# Patient Record
Sex: MALE | Race: WHITE | NOT HISPANIC OR LATINO | URBAN - METROPOLITAN AREA
[De-identification: names, ages, dates, MRNs, and addresses within clinical notes are randomized per-mention and may not be internally consistent; named-entity substitution may affect disease eponyms.]

---

## 2017-01-01 ENCOUNTER — HOME CARE/HOSPICE - HEALTHEAST (OUTPATIENT)
Dept: HOSPICE | Facility: HOSPICE | Age: 62
End: 2017-01-01

## 2017-01-01 ENCOUNTER — COMMUNICATION - HEALTHEAST (OUTPATIENT)
Dept: FAMILY MEDICINE | Facility: CLINIC | Age: 62
End: 2017-01-01

## 2017-01-01 ENCOUNTER — AMBULATORY - HEALTHEAST (OUTPATIENT)
Dept: HOSPICE | Facility: HOSPICE | Age: 62
End: 2017-01-01

## 2017-01-01 ENCOUNTER — HOSPITAL ENCOUNTER (INPATIENT)
Dept: MEDSURG UNIT | Facility: HOSPITAL | Age: 62
Discharge: HOME | End: 2017-01-25
Attending: HOSPITALIST | Admitting: INTERNAL MEDICINE

## 2017-01-01 ENCOUNTER — SURGERY - HEALTHEAST (OUTPATIENT)
Dept: SURGERY | Facility: HOSPITAL | Age: 62
End: 2017-01-01

## 2017-01-01 ENCOUNTER — ANESTHESIA - HEALTHEAST (OUTPATIENT)
Dept: SURGERY | Facility: HOSPITAL | Age: 62
End: 2017-01-01

## 2017-01-01 DIAGNOSIS — G93.40 ENCEPHALOPATHY: ICD-10-CM

## 2017-01-01 DIAGNOSIS — K66.1: ICD-10-CM

## 2017-01-01 DIAGNOSIS — K70.31 ASCITES DUE TO ALCOHOLIC CIRRHOSIS (H): ICD-10-CM

## 2017-01-01 DIAGNOSIS — I95.9 HYPOTENSION: ICD-10-CM

## 2017-01-01 DIAGNOSIS — D64.9 ANEMIA: ICD-10-CM

## 2017-01-01 DIAGNOSIS — R46.89 COMPULSIVE BEHAVIORS: ICD-10-CM

## 2017-01-01 DIAGNOSIS — F10.930 ALCOHOL WITHDRAWAL, UNCOMPLICATED (H): ICD-10-CM

## 2017-01-01 DIAGNOSIS — L29.3 PERINEAL IRRITATION: ICD-10-CM

## 2017-01-01 DIAGNOSIS — R00.0 TACHYCARDIA: ICD-10-CM

## 2017-01-01 DIAGNOSIS — D62 ACUTE BLOOD LOSS ANEMIA: ICD-10-CM

## 2017-01-01 DIAGNOSIS — E87.20 LACTIC ACIDOSIS: ICD-10-CM

## 2017-01-01 DIAGNOSIS — K72.10 END STAGE LIVER DISEASE (H): ICD-10-CM

## 2017-01-01 DIAGNOSIS — B37.0 ORAL CANDIDIASIS: ICD-10-CM

## 2017-01-01 DIAGNOSIS — K76.9 LIVER DISEASE: ICD-10-CM

## 2017-01-01 DIAGNOSIS — M54.50 LUMBAGO: ICD-10-CM

## 2017-01-01 DIAGNOSIS — K70.10 ALCOHOLIC HEPATITIS WITHOUT ASCITES (H): ICD-10-CM

## 2017-01-01 DIAGNOSIS — K66.1 HEMOPERITONEUM: ICD-10-CM

## 2017-01-01 DIAGNOSIS — F09 COGNITIVE DISORDER: ICD-10-CM

## 2017-01-01 LAB
ATRIAL RATE - MUSE: 114 BPM
ATRIAL RATE - MUSE: 57 BPM
ATRIAL RATE - MUSE: 71 BPM
ATRIAL RATE - MUSE: 85 BPM
BLD PROD TYP BPU: NORMAL
BLOOD EXPIRATION DATE: NORMAL
BLOOD TYPE: 600
BLOOD TYPE: 6200
CODING SYSTEM: NORMAL
COMPONENT (HISTORICAL CONVERSION): NORMAL
CROSSMATCH: NORMAL
DIASTOLIC BLOOD PRESSURE - MUSE: NORMAL MMHG
HBA1C MFR BLD: 5 % (ref 4.2–6.1)
INTERPRETATION ECG - MUSE: NORMAL
ISSUE DATE AND TIME: NORMAL
P AXIS - MUSE: 22 DEGREES
P AXIS - MUSE: 23 DEGREES
P AXIS - MUSE: 23 DEGREES
P AXIS - MUSE: 24 DEGREES
PR INTERVAL - MUSE: 138 MS
PR INTERVAL - MUSE: 142 MS
PR INTERVAL - MUSE: 152 MS
PR INTERVAL - MUSE: 160 MS
QRS DURATION - MUSE: 102 MS
QRS DURATION - MUSE: 88 MS
QRS DURATION - MUSE: 90 MS
QRS DURATION - MUSE: 98 MS
QT - MUSE: 348 MS
QT - MUSE: 412 MS
QT - MUSE: 458 MS
QT - MUSE: 514 MS
QTC - MUSE: 479 MS
QTC - MUSE: 490 MS
QTC - MUSE: 497 MS
QTC - MUSE: 500 MS
R AXIS - MUSE: -15 DEGREES
R AXIS - MUSE: -19 DEGREES
R AXIS - MUSE: -21 DEGREES
R AXIS - MUSE: -23 DEGREES
STATUS (HISTORICAL CONVERSION): NORMAL
SYSTOLIC BLOOD PRESSURE - MUSE: NORMAL MMHG
T AXIS - MUSE: -11 DEGREES
T AXIS - MUSE: -14 DEGREES
T AXIS - MUSE: -6 DEGREES
T AXIS - MUSE: 11 DEGREES
UNIT ABO/RH (HISTORICAL CONVERSION): NORMAL
UNIT NUMBER: NORMAL
VENTRICULAR RATE- MUSE: 114 BPM
VENTRICULAR RATE- MUSE: 57 BPM
VENTRICULAR RATE- MUSE: 71 BPM
VENTRICULAR RATE- MUSE: 85 BPM

## 2017-01-01 RX ORDER — HALOPERIDOL 2 MG/ML
1-2 SOLUTION ORAL EVERY 4 HOURS PRN
Status: SHIPPED | COMMUNITY
Start: 2017-01-01

## 2017-01-01 RX ORDER — SORBITOL SOLUTION 70 %
15-30 SOLUTION, ORAL MISCELLANEOUS DAILY PRN
Status: SHIPPED | COMMUNITY
Start: 2017-01-01

## 2017-01-01 RX ORDER — TRAZODONE HYDROCHLORIDE 150 MG/1
150 TABLET ORAL AT BEDTIME
Status: SHIPPED | COMMUNITY
Start: 2017-01-01

## 2017-01-01 RX ORDER — HYDROMORPHONE HYDROCHLORIDE 1 MG/ML
1 SOLUTION ORAL 3 TIMES DAILY
Status: SHIPPED | COMMUNITY
Start: 2017-01-01

## 2017-01-01 RX ORDER — OXYMETAZOLINE HYDROCHLORIDE 0.05 G/100ML
2 SPRAY NASAL SEE ADMIN INSTRUCTIONS
Status: SHIPPED | COMMUNITY
Start: 2017-01-01

## 2017-01-01 RX ORDER — HYDROMORPHONE HYDROCHLORIDE 1 MG/ML
0.5-1 SOLUTION ORAL
Qty: 210 ML | Refills: 0 | Status: SHIPPED
Start: 2017-01-01

## 2017-01-01 RX ORDER — GABAPENTIN 250 MG/5ML
300 SOLUTION ORAL EVERY 8 HOURS
Qty: 470 ML | Refills: 12 | Status: SHIPPED
Start: 2017-01-01

## 2017-01-01 RX ORDER — GABAPENTIN 250 MG/5ML
500 SOLUTION ORAL 2 TIMES DAILY PRN
Qty: 470 ML | Refills: 12 | Status: SHIPPED
Start: 2017-01-01

## 2017-01-01 RX ORDER — GINSENG 100 MG
1 CAPSULE ORAL 2 TIMES DAILY
Status: SHIPPED | COMMUNITY
Start: 2017-01-01

## 2017-01-01 RX ORDER — LORAZEPAM 2 MG/ML
2 CONCENTRATE ORAL
Status: SHIPPED | COMMUNITY
Start: 2017-01-01

## 2017-01-01 RX ORDER — MENTHOL AND ZINC OXIDE .44; 20.625 G/100G; G/100G
1 OINTMENT TOPICAL 4 TIMES DAILY PRN
Refills: 0 | Status: SHIPPED
Start: 2017-01-01

## 2017-01-01 RX ORDER — LORAZEPAM 2 MG/ML
1-2 CONCENTRATE ORAL AT BEDTIME
Status: SHIPPED | COMMUNITY
Start: 2017-01-01

## 2017-01-01 RX ORDER — BISACODYL 10 MG
10 SUPPOSITORY, RECTAL RECTAL DAILY PRN
Status: SHIPPED | COMMUNITY
Start: 2017-01-01

## 2017-01-01 RX ORDER — HALOPERIDOL 5 MG/ML
5 INJECTION INTRAMUSCULAR ONCE
Status: SHIPPED | COMMUNITY
Start: 2017-01-01

## 2017-01-01 ASSESSMENT — MIFFLIN-ST. JEOR
SCORE: 2057.7
SCORE: 2043.63
SCORE: 1892.63
SCORE: 1906.63
SCORE: 1961.63
SCORE: 2038.63
SCORE: 1900.63
SCORE: 1868.63
SCORE: 1937.63
SCORE: 1906.63
SCORE: 1900.63
SCORE: 2032.63
SCORE: 2040.63
SCORE: 2043.63
SCORE: 2040.63
SCORE: 2038.63
SCORE: 2047.27
SCORE: 2069.95
SCORE: 1961.63
SCORE: 2009.62
SCORE: 2069.95
SCORE: 1880.34
SCORE: 2009.62
SCORE: 1892.63
SCORE: 2032.63
SCORE: 2047.27
SCORE: 1937.63
SCORE: 2057.7
SCORE: 1983.63
SCORE: 1983.63
SCORE: 1868.63

## 2021-05-30 VITALS
WEIGHT: 218.92 LBS | BODY MASS INDEX: 27.22 KG/M2 | HEIGHT: 75 IN | WEIGHT: 218.92 LBS | BODY MASS INDEX: 27.22 KG/M2 | HEIGHT: 75 IN

## 2021-05-30 VITALS — BODY MASS INDEX: 26.55 KG/M2 | HEIGHT: 76 IN | WEIGHT: 218 LBS

## 2021-06-08 NOTE — PROGRESS NOTES
Patient continues to bleed. Received total 4 units PRBC's overnight. Fibrinogen is 205. Platelets 180.  His INR is elevated but in patients with liver disease elevated INR is a poor marker of coagulation since other proteins of the clotting / lysis cascade are affected as well and they are not measured by PT/INR. TEG/Rottem  would be a better test  I will go ahead and give him Kcentra instead of multiple units of FFP since increasing his volume aggressively will lead to increased portal pressures and more bleeding.   Calcium gluconate 1 gr.    He is going to the OR this AM. D/W Dr. Hodge.

## 2021-06-08 NOTE — PROGRESS NOTES
ICU PROGRESS NOTE:    Assessment/Plan:  Babak Wolff is a 61 y.o. 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    NEURO:  Acute encephalopathy, agitated delirium: Multiple factors including possible hepatic encephalopathy, septic encephalopathy.  Ammonia on admission 17.  EtOH level negative on admission, unknown most recent EtOH use.    Recheck ammonia - only 34    dexmedetomidine as needed    Will try olanzapine at HS    Psychiatry had recommended scheduled gabapentin - will schedule and clamp NG tube if tolerated    Assess for and treat causes of sepsis    Minimize benzos as able.    CVS:  No active issues.    monitor    RESP:  No active issues.    monitor    GI:  Hemoperitoneum, likely advanced liver disease/cirrhosis, alcohol dependence: Patient has had chronic intermittent abdominal pain.  Had minimized alcohol use as outpatient.  Brother and sister confirm that he is a severe alcoholic, after moving back here from [place] he had multiple empty 1.75-L vodka bottles throughout his home, would start drinking in the morning and was often intoxicated by mid-day; his live-in girlfriend has reportedly had to bring vodka bottles to work with her to prevent him from drinking them.  Now with hemoperitoneum, mesenteric varix, coagulopathy with INR 1.5-1.7, tbili 4-5, -120 (now higher likely reactive to acute infection today), albumin in 2s.  CT with evidence of diffuse liver disease.    Ammonia as above, schedule lactulose as needed    Will need close follow-up with chem dep and GI if able to recover    Discussed with family that liver transplant is not a consideration in this acute setting    RENAL:  Currently stable kidney  "function.    monitor    ID:  Hemoperitoneum, fever, neutrophilia: High risk of peritonitis, and high risk of fungal peritonitis/fungemia with hemoperitoneum, liver disease and TPN.  On meropenem and vanco but temp up to 39.7 overnight, WBC up to 29.3.    ID consult - appreciated    Continue meropenem and vanco    Starting micafungin today    Repeat blood cultures (PICC and peripheral)    Fungal blood culture    HEMATOLOGIC:  Hemoperitoneum, anemia: Acute blood loss and anemia of sepsis/critical illness. Coagulopathy, likely due to cirrhosis, nutritional, consumptive    Monitor with restrictive transfusion threshold    Monitor INR prn    TPN for nutrition    ENDOCRINE:  No active issues.    Monitor    ICU PROPHYLAXIS:    SCDs    PPI    DISPO/CODE STATUS: FULL CODE.  Today (1/19/2017), the patient is critically ill with agitated delirium.    FAMILY COMMUNICATION: Today (1/19/2017), spoke with the patient's brother and sister at the bedside.    Lines/Drains/Tubes:  ABDIFATAHE PICC, Placed on 1/14/17  OG tube  Saeed catheter  ZACHARY peritoneal drains x 2    Overnight events:  High fever 39.7.  Still delirious, difficulty vocalizing.  Tearful.    Subjective:  Unable to answer yes/no questions.    Objective:  Physical Exam:  Vent settings for last 24 hours: none       Visit Vitals     /56     Pulse 95     Temp 98.6  F (37  C) (Axillary)     Resp 25     Ht 6' 3\" (1.905 m)     Wt (!) 234 lb 2.1 oz (106.2 kg)     SpO2 95%     BMI 29.26 kg/m2       Intake/Output last 3 shifts:  I/O last 3 completed shifts:  In: 4535.3 [I.V.:1557.3; IV Piggyback:1060]  Out: 6960 [Urine:1775; Emesis/NG output:2585; Drains:2600]  Intake/Output this shift:  I/O this shift:  In: -   Out: 325 [Urine:150; Drains:175]    Physical Exam  Gen: somnolent, tearful, can say his name but difficulty answering other questions  HEENT: no OP lesions, no TOMMY  CV: RRR, no m/g/r  Resp: CTAB  Abd: soft, nontender, BS+  Neuro: PERRL, nonfocal  Ext: no " edema    LAB:    Results from last 7 days  Lab Units 01/19/17  0601   LN-WHITE BLOOD CELL COUNT thou/uL 29.3*   LN-HEMOGLOBIN g/dL 9.2*   LN-HEMATOCRIT % 28.0*   LN-PLATELET COUNT thou/uL 207       Results from last 7 days  Lab Units 01/19/17  0601 01/18/17  0501 01/17/17  0442   LN-SODIUM mmol/L 148*  148* 148* 146*  146*   LN-POTASSIUM mmol/L 3.6  3.6 3.6 3.9  3.9   LN-CHLORIDE mmol/L 117*  117* 117* 116*  116*   LN-CO2 mmol/L 24  24 24 23  24   LN-BLOOD UREA NITROGEN mg/dL 17  17 19 15  15   LN-CREATININE mg/dL 0.79  0.79 0.81 0.74  0.73   LN-CALCIUM mg/dL 8.1*  8.1* 8.2* 7.9*  7.9*   LN-PROTEIN TOTAL g/dL 5.5* 5.0* 4.6*  4.8*   LN-BILIRUBIN TOTAL mg/dL 5.1* 4.2* 4.3*  4.3*   LN-ALKALINE PHOSPHATASE U/L 69 55 47  46   LN-ALT (SGPT) U/L 37 26 26   LN-AST (SGOT) U/L 108* 73* 71*  70*         Current Facility-Administered Medications   Medication Dose Route Frequency Provider Last Rate Last Dose     acetaminophen suppository 325 mg (TYLENOL)  325 mg Rectal Q4H PRN Chi Watson MD   325 mg at 01/19/17 0546     albuterol nebulizer solution 2.5 mg (PROVENTIL)  2.5 mg Nebulization Q4H PRN Joy Delgado CNP         benzocaine-menthol lozenge 1 lozenge (CEPACOL)  1 lozenge Oral Q1H PRN Chi Watson MD         bisacodyl suppository 10 mg (DULCOLAX)  10 mg Rectal Daily PRN Chi Watson MD   10 mg at 01/17/17 1424     dexmedetomidine 400 mcg/100 mL in NS (PRECEDEX) (4mcg/mL)  0.2-1.4 mcg/kg/hr Intravenous Continuous Joy Schwartz CNP 5.9 mL/hr at 01/19/17 1204 0.2 mcg/kg/hr at 01/19/17 1204     dextrose 10%  0-200 mL/hr Intravenous Continuous PRN Alf Shannon MD         dextrose 50 % (D50W) syringe 20-50 mL  20-50 mL Intravenous PRN Chi Watson MD         fat emulsion 20 % infusion 250 mL (INTRALIPID,LIPOSYN)  250 mL Intravenous Once per day on Sun Tue Thu Sat Alf Shannon MD 10.4 mL/hr at 01/17/17 2122 250 mL at 01/17/17 2122     fentaNYL pf  injection 25-50 mcg (SUBLIMAZE)  25-50 mcg Intravenous Q1H PRN Alf Shannon MD   50 mcg at 01/19/17 0158     gabapentin 250 mg/5 mL solution 500 mg (NEURONTIN)  500 mg Enteral Tube BID PRN Sarahruben Cox CNP         glucagon (human recombinant) injection 1 mg  1 mg Subcutaneous PRN Chi Watson MD         haloperidol lactate injection 1-2 mg (HALDOL)  1-2 mg Intravenous Q6H PRN Joy Delgado CNP   2 mg at 01/19/17 0132     heparin (PF) subcutaneous injection 5,000 Units  5,000 Units Subcutaneous Q12H 09-21 Joy Schwartz CNP   5,000 Units at 01/19/17 0841     hydrALAZINE injection 10 mg (APRESOLINE)  10 mg Intravenous Q6H PRN Chi Watson MD   10 mg at 01/19/17 0455     insulin aspart injection (NovoLOG)   Subcutaneous Q6H FIXED TIMES Joy Schwartz CNP   4 Units at 01/19/17 1119     insulin glargine injection 10 Units (LANTUS)  10 Units Subcutaneous BID Joy Schwartz CNP   10 Units at 01/19/17 0945     labetalol 20 mg/4 mL (5 mg/mL) injection 10-20 mg (TRANDATE)  10-20 mg Intravenous Q4H PRN Gela Pinon   10 mg at 01/19/17 0618     LORazepam injection 1 mg (ATIVAN)  1 mg Intravenous TID Sarah STEVE Cox CNP   1 mg at 01/19/17 0945     LORazepam injection 1-2 mg (ATIVAN)  1-2 mg Intravenous Q4H PRN Joy Delgado CNP   2 mg at 01/19/17 0607     magnesium hydroxide suspension 30 mL (MILK OF MAG)  30 mL Oral Daily PRN Chi Watson MD   30 mL at 01/16/17 1949     meropenem 1 g in NaCl 0.9 % 50 mL (MINI-BAG Plus) (MERREM)  1 g Intravenous Q8H Ry Pemberton  mL/hr at 01/19/17 1034 1 g at 01/19/17 1034     micafungin 100 mg in NaCl 0.9 % 100 mL (MINI-BAG Plus) (MYCAMINE)  100 mg Intravenous Q24H Katheryn Valente MD         morphine injection 1-2 mg  1-2 mg Intravenous Q4H PRN Joy Schwartz CNP   2 mg at 01/19/17 0126     naloxone injection 0.04-0.1 mg (NARCAN)  0.04-0.1 mg Intravenous PRN Spencer Mayorga MD        Or     naloxone injection 0.1-0.4  mg (NARCAN)  0.1-0.4 mg Intramuscular PRN Spencer Mayorga MD         ondansetron injection 4 mg (ZOFRAN)  4 mg Intravenous Q4H PRN Chi Watson MD   4 mg at 01/18/17 1720    Or     ondansetron tablet 8 mg (ZOFRAN)  8 mg Oral Q8H PRN Chi Watson MD         oxyCODONE immediate release tablet 5 mg (ROXICODONE)  5 mg Oral Q4H PRN Chi Watson MD   5 mg at 01/17/17 1526     polyvinyl alcohol 1.4 % ophthalmic solution 1-2 drop (LIQUIFILM TEARS)  1-2 drop Both Eyes Q1H PRN Chi Watson MD   2 drop at 01/16/17 1742     senna-docusate 8.6-50 mg tablet 1 tablet (PERICOLACE)  1 tablet Oral BID Chi Watson MD   1 tablet at 01/18/17 0832    Or     sennosides syrup 8.8 mg (for SENOKOT)  8.8 mg Enteral Tube BID Chi Watson MD   8.8 mg at 01/17/17 1111     sodium chloride 0.45%  25 mL/hr Intravenous Continuous Gela Dahm 25 mL/hr at 01/18/17 0713 25 mL/hr at 01/18/17 0713     sodium chloride 0.9 % flush 10-20 mL (NS)  10-20 mL Intravenous PRN Alf Shannon MD   10 mL at 01/17/17 0425     sodium chloride 0.9 % flush 10-30 mL (NS)  10-30 mL Intravenous PRN Alf Shannon MD         sodium chloride 0.9 % flush 10-30 mL (NS)  10-30 mL Intravenous Q8H FIXED TIMES Alf Shannon MD   20 mL at 01/19/17 0601     sodium chloride 0.9 % flush 20 mL (NS)  20 mL Intravenous PRN Alf Shannon MD         sodium chloride 0.9 % flush 3 mL (NS)  3 mL Intravenous PRN Spencer Mayorga MD   10 mL at 01/11/17 1417     sodium phosphates 133 mL rectal enema 1 enema (for FLEET)  1 enema Rectal Daily PRN Gela Dahm   1 enema at 01/16/17 2115     TPN - Custom formula   Intravenous TPN - see admin instructions Ry Pemberton MD 75 mL/hr at 01/18/17 1944       TPN - Custom formula   Intravenous TPN - see admin instructions Johnnie Lockett MD         vancomycin 1.5 g in dextrose 5% 500 mL (VANCOCIN)  1.5 g Intravenous Q12H Joy Schwartz, JOHNNY 176.7 mL/hr at  01/19/17 1109 1.5 g at 01/19/17 1109       ICU DAILY CHECKLIST                           Can patient transfer out of MICU? no    FAST HUG:    Feeding:  Feeding: TPN, OG to LIS.  Saeed:Yes  Analgesia/Sedation:Yes dexmedetomidine  Thromboembolic prophylaxis: yes; Mode:  SCDs  HOB>30:  Yes  Stress Ulcer Protocol Active: yes; Mode: H2 Antagonist  Glycemic Control: Any glucose > 180 yes; Mode of Insulin Therapy: Sliding Scale Insulin    INTUBATED:  Can patient have daily waking:  not applicable  Can patient have spontaneous breathing trial:  not applicable    Restraints? no    PHYSICAL THERAPY AND MOBILITY:  Can patient have PT and mobility trial: no  Activity: Bed Rest    Total Critical Care Time : 45 Minutes      Johnnie Lockett MD  Pulmonary and Critical Care Medicine  [facility]  Cell [phone]  Office [phone]  Pager [phone]

## 2021-06-08 NOTE — PROGRESS NOTES
This note also relates to the following rows which could not be included:  Vent Mode - Cannot attach notes to unvalidated device data  Resp Rate (Set) - Cannot attach notes to unvalidated device data  FiO2 (%) - Cannot attach notes to unvalidated device data  Vt (Set, mL) - Cannot attach notes to unvalidated device data  PEEP/CPAP (cm H2O) - Cannot attach notes to unvalidated device data  Insp Flow (L/min) - Cannot attach notes to unvalidated device data  Vt Exp (mL) - Cannot attach notes to unvalidated device data  Minute Ventilation (L/min) - Cannot attach notes to unvalidated device data  Total Resp Rate  - Cannot attach notes to unvalidated device data  PIP Observed (cm H2O) - Cannot attach notes to unvalidated device data  MAP (cm H2O) - Cannot attach notes to unvalidated device data  SpO2 - Cannot attach notes to unvalidated device data  Heart Rate - Cannot attach notes to unvalidated device data  High Resp Rate - Cannot attach notes to unvalidated device data  Low PEEP - Cannot attach notes to unvalidated device data  Insp Pressure High (cm H2O) - Cannot attach notes to unvalidated device data  Insp Pressure Low (cm H2O) - Cannot attach notes to unvalidated device data  MV High (L/min) - Cannot attach notes to unvalidated device data  MV Low (L/min) - Cannot attach notes to unvalidated device data  Vt Low (mL) - Cannot attach notes to unvalidated device data       01/12/17 1911   Vent Information   Interface Invasive   Vent ID [id number]   Ventilator Status On   Flowmeter at Bedside Yes   Ambu-Bag With Mask at Bedside Yes   Respiratory Assessment   Assessment Type Assess only   Respiratory Pattern Regular   Chest Assessment Chest expansion symmetrical   Bilateral Breath Sounds Clear;Diminished   Vent Settings   Trigger Sensitivity Flow (L/min) 5 L/min   Trigger Sensitivity Pressure (cm H2O) 2 cm H2O   Humidification Heat and moisture exchanger   Patient Data   Auto/Intrinsic PEEP Observed (cm H2O) 2 cm H2O    Plateau Pressure (cm H2O) 16 cm H2O   Alarms   Low Resp Rate 12   Vt High (mL) 1200 mL   Apnea Interval (sec) 30 seconds   Apnea Rate 14   Apnea Volume (mL) 600 mL   Alarm Functional and On Yes   Backup Mode Set Yes   Airways   Airway LDA Subglottic Suction endotracheal tube   Airway Suctioning/Secretions   Suction Device  Inline   Secretion Amount Small   Secretion Color White   Secretion Consistency Thick   Suction Tolerance Tolerated well   Suctioning Adverse Effects None   Subglottic Suction ET Tube 8   Placement Date/Time: 01/11/17 1000   Subglottic Suction ETT Entry Site: Right  Size : 8  Cuffed: Cuffed  ETT Placement Confirmation: Bilateral breath sounds  Placed By: Anesthesiologist   Secured at (cm) 25 cm   Measured from Teeth   Secured Location Left   Secured with Commercial tube reed   Cuff Pressure (cm H2O) 28 cm H2O   Site Condition Dry   Subglottic Secretions Small   Subglottic Suction Frequency Intermittent suction   Subglottic Suction Pressure 120 mmHg   Subglottic Suction Lumen Intervention Cleaned   ETT 8   Placement Date/Time: 01/11/17 1008   ETT Type: Straight  Size : 8  Cuffed: Cuffed  Secured at (cm): 22  Glidescope: Blade 4  Bite Block Used: Soft  Placed By: CRNA  Technique: Atraumatic  Difficulty: 0   Assess Teeth OK'd   Secured at (cm) 25 cm   Measured from Teeth   Secured Location Left   Secured with Commercial tube reed   Site Condition Dry   Cuff Pressure (cm H2O) 30 cm H2O     Pt remain on above vent setting. BS clear diminished, suction thick white secretion. Plan is to wean tomorrow as tolerated. Rt will cont to follow up

## 2021-06-08 NOTE — PROGRESS NOTES
Psychiatric Progress Note      Encephalopathy, multifactorial : hyper-hypoactive delirium with behavioral restlessness and agitation. Improving.    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  PLAN/RECOMMENDATIONS:  Ativan 0.5 mg QID.  Neurontin 300 mg Q8H.  Ambien 5 mg QHS.      SUBJECTIVE:  Doing better. Follows very simple commands. Not engaging in any conversation. Fidgety. Calms down when he is redirected.    MENTAL STATUS EXAMINATION:     Speech is disorganized.  Thought processes disorganized, disoriented.  Thought content does not show active visual or auditory hallucinations, delusions or paranoia.  Confabulation present.  Speech is minimal and with increased latency.  Thought process with increased latency.  Difficulty processing simple information.  Following simple commands.   Judgment, insight, memory, attention, comprehension, fund of knowledge are grossly depressed.    Not able to process complex cognitive information.  Significant inattention present.    Difficulty with problem solving.  Impairment in executive functioning.  Orientation limited to self.  Affect is flat, mood apathetic.  Variable anxiety levels. Mostly calm.  Gait and ambulation : not tested, in ICU bed, pulling on NG tube.   Vital signs in last 24 hours  Temp:  [98.4  F (36.9  C)-102.1  F (38.9  C)] 98.5  F (36.9  C)  Heart Rate:  [] 125  Resp:  [21-34] 29  BP: ()/(54-73) 145/67  Weight:   (!) 227 lb 4.7 oz (103.1 kg)

## 2021-06-08 NOTE — PROGRESS NOTES
Pt extubated at 0930   O2 at 3liters  sats 93-94% RR 18-20.  Pt disoriented x4.    Precedex off at 0937 back on at 1006 due to pts agitation, increased BP and heart rate.  OG dc'd with extubation.  Abdomen distended very hypoactive bowel sounds ZACHARY's remained clamped minimal oozing around drain sites

## 2021-06-08 NOTE — PROGRESS NOTES
Babak Wolff is stable, placed on hospice, MELD decreasing, having bms        Vitals:    01/23/17 0809 01/23/17 0900 01/23/17 1000 01/23/17 1108   BP: 140/70 138/72  140/71   Patient Position: Lying      Pulse: (!) 121 (!) 123 (!) 123 (!) 124   Resp: (!) 29 (!) 30 23 (!) 30   Temp: 99.9  F (37.7  C)      TempSrc: Oral      SpO2: 95% 95% 96% 96%   Weight:       Height:           PHYSICAL EXAM:  GEN: No acute distress, comfortable, jaundiced  ABD:mildly distended  EXT:No cyanosis, edema or obvious abnormalities          Results from last 7 days  Lab Units 01/23/17  0459 01/22/17  0629   LN-WHITE BLOOD CELL COUNT thou/uL 20.3* 20.3*   LN-HEMOGLOBIN g/dL 8.6* 8.5*   LN-HEMATOCRIT % 26.2* 26.3*   LN-PLATELET COUNT thou/uL  --  165         Results from last 7 days  Lab Units 01/23/17  0459   LN-SODIUM mmol/L 146*   LN-POTASSIUM mmol/L 3.5   LN-CHLORIDE mmol/L 112*   LN-CO2 mmol/L 26   LN-BLOOD UREA NITROGEN mg/dL 30*   LN-CREATININE mg/dL 0.93   LN-CALCIUM mg/dL 8.3*   LN-ALBUMIN g/dL 2.3*   LN-PROTEIN TOTAL g/dL 5.9*   LN-BILIRUBIN TOTAL mg/dL 5.5*   LN-ALKALINE PHOSPHATASE U/L 67   LN-ALT (SGPT) U/L 61*   LN-AST (SGOT) U/L 142*         A/P:  Babak Wolff is stable  -may dc ng tube  -clears as tolerated  -bilirubin stable   -staples in for a few more days  -hospice    Robert Hodge D.O., FACS  [phone]  [facility] Department of Surgery

## 2021-06-08 NOTE — PROGRESS NOTES
"Pharmacy Consult: Vancomycin Dosing    Pharmacist consulted to dose vancomycin for Babak Wolff, a 61 y.o. male.    Ordering provider: Joy Schwartz CNP    Indication for vancomycin therapy: worsening leukocytosis and fevers, HCAP?    Goal Trough Range:  15-20 mcg/mL based on indication    Other current antimicrobials                  meropenem 1 g in NaCl 0.9 % 50 mL (MINI-BAG Plus) (MERREM)    micafungin 100 mg in NaCl 0.9 % 100 mL (MINI-BAG Plus) (MYCAMINE)    nystatin 100,000 unit/mL suspension 500,000 Units (MYCOSTATIN)        Subjective/Objective:    Patient was admitted for Shock circulatory on 1/9/2017    Height: 6' 3\" (1.905 m)    Actual Body Weight (ABW): (!) 103.1 kg (227 lb 4.7 oz)    Ideal body weight: 84.5 kg (186 lb 4.6 oz)  Adjusted ideal body weight: 91.9 kg (202 lb 11.1 oz)    BMI: Body mass index is 28.41 kg/(m^2).    No Known Allergies    Patient Active Problem List   Diagnosis     Male Erectile Disorder     Eczema     Lower Back Pain     Insomnia     Vitamin D Deficiency     Shock circulatory     Hemorrhage, peritoneal     Acute blood loss anemia     Lactic acidosis     Alcohol withdrawal, uncomplicated     Respiratory failure, post-operative     Encephalopathy     Cognitive disorder     Compulsive behaviors    Past Medical History   Diagnosis Date     Chronic abdominal pain      Insomnia         Temp Readings from Current Encounter:     01/20/17 0100 01/20/17 0400 01/20/17 0800   Temp: (!) 102.1  F (38.9  C) (!) 101.5  F (38.6  C) (!) 100.8  F (38.2  C)     Net Intake/Output (last 24 hours):  I/O last 3 completed shifts:  In: 3711 [I.V.:532; NG/GT:135; IV Piggyback:1450]  Out: 3430 [Urine:1430; Emesis/NG output:300; Drains:1700]    Recent Labs      01/18/17   0501  01/19/17   0601  01/19/17   1121  01/20/17   0426   WBC  14.7*  29.3*   --   23.2*   NEUTROABS  10.5*  23.6*   --    --    PROCAL   --    --   0.90*   --    BUN  19  17  17   --   22   CREATININE  0.81  0.79  0.79   --   0.84 "     Estimated Creatinine Clearance: 123.1 mL/min (by C-G formula based on Cr of 0.84).    Recent Labs      01/18/17   1259   CULTURE  No Growth       Results for orders placed or performed during the hospital encounter of 01/09/17   Culture, Urine    Collection Time: 01/18/17 12:59 PM   Result Value Status    Culture No Growth Final   Urine culture    Collection Time: 01/15/17  2:12 PM   Result Value Status    Culture No Growth Final   Sputum culture    Collection Time: 01/12/17  4:16 PM   Result Value Status    Culture No Growth Final       Recent labs: (last 7 days)      01/19/17   2245   VANCOMYCIN  11.3       Vancomycin administrations: (last 120 hours)     Date/Time Action Medication Dose Rate    01/19/17 2308 New Bag    vancomycin 1.5 g in dextrose 5% 500 mL (VANCOCIN) 1.5 g 176.7 mL/hr    01/19/17 1109 New Bag    vancomycin 1.5 g in dextrose 5% 500 mL (VANCOCIN) 1.5 g 176.7 mL/hr    01/18/17 2203 New Bag    vancomycin 1.5 g in dextrose 5% 500 mL (VANCOCIN) 1.5 g 176.7 mL/hr    01/18/17 1221 New Bag    vancomycin 1.5 g in dextrose 5% 500 mL (VANCOCIN) 1.5 g 176.7 mL/hr          Assessment/Plan:    Pharmacist consulted to dose vancomycin for worsening leukocytosis and/or possible HCAP, goal trough range 15-20 mcg/mL.  1. Change to vancomycin 1500mg IV every 8 hours (14 mg/kg actual body weight).  I s/w Dr Valente, he is ok with q8hr Vanco dosing at this time.  2. Vancomycin trough level of 11.3 mcg/mL was below the goal trough range. This trough level was drawn at steady state.  3. Pharmacist will plan to re-check a vancomycin trough level 1/21 after at least 3 doses have been given of new regimen (or renal fxn dictates).  4. Pharmacist will continue to follow.    Thank you for the consult.  Chelo Khan, PharmD 1/20/2017 9:42 AM

## 2021-06-08 NOTE — PROGRESS NOTES
Prece dex Gtt/Sedation:    Nursing has been able to wean prece dex gtt to off, however, despite other interventions, patient has become very restless.  He is also febrile and shivering.  I think at this point we will restart the Precedex at a low dose.  There is potential the Precedex can cause fever, however he has vomited once today, and JPs continue to put out large amounts of fluid.  His stool has been mucous like.  I am concerned this is more infectious in nature and or an ileus.    I am also placing a CT of the abdomen for tomorrow.  I do not think that he would do well and cooperate with the exam tonight.  He is tender to palpation, and given his fever and operative history, I would like to r/o abscess and or ileus.    We will also continue to monitor the QTc as it has been elevated.    Gela Pinon, CNP  Pulmonary Critical Care

## 2021-06-08 NOTE — PROGRESS NOTES
Babak Wolff is more alert today, no acute distress point the pain        Vitals:    01/22/17 0900 01/22/17 1000 01/22/17 1100 01/22/17 1200   BP: 134/66 142/62 144/76 141/68   Patient Position:   Sitting    Pulse: (!) 126 (!) 128 (!) 133 (!) 125   Resp: (!) 30 (!) 35 (!) 37 (!) 30   Temp:   99.1  F (37.3  C)    TempSrc:   Oral    SpO2: 91% 92% 94% 93%   Weight:       Height:           PHYSICAL EXAM:  GEN: No acute distress, comfortable, jaundiced  ABD: Mild distention, staples intact, ZACHARY drains with ascites, nontender  EXT:No cyanosis, edema or obvious abnormalities          Results from last 7 days  Lab Units 01/22/17  0629   LN-WHITE BLOOD CELL COUNT thou/uL 20.3*   LN-HEMOGLOBIN g/dL 8.5*   LN-HEMATOCRIT % 26.3*   LN-PLATELET COUNT thou/uL 165         Results from last 7 days  Lab Units 01/22/17  0629   LN-SODIUM mmol/L 146*   LN-POTASSIUM mmol/L 3.7   LN-CHLORIDE mmol/L 113*   LN-CO2 mmol/L 25   LN-BLOOD UREA NITROGEN mg/dL 25*   LN-CREATININE mg/dL 0.95   LN-CALCIUM mg/dL 8.1*   LN-ALBUMIN g/dL 2.4*   LN-PROTEIN TOTAL g/dL 5.9*   LN-BILIRUBIN TOTAL mg/dL 5.6*   LN-ALKALINE PHOSPHATASE U/L 66   LN-ALT (SGPT) U/L 59*   LN-AST (SGOT) U/L 143*         A/P:  Babak Wolff is improving  -No new surgery recommendations  -Liver derangements persists  -May remove NG tube if patient is hungry like to try clear liquids  -ZACHARY drains remain in for now    Robert Hodge D.O. FACS  [phone]  [facility] Department of Surgery

## 2021-06-08 NOTE — PROGRESS NOTES
GI Progress Note  Babak Wolff  [id number]    Subjective:     Unable to obtain reliable history from patient. Ongoing leukocytosis.     Objective:     Vitals:    01/21/17 1100   BP: 137/65   Pulse: (!) 119   Resp: 27   Temp:    SpO2: 92%     Body mass index is 28.24 kg/(m^2).   Gen: No acute distress, NG, mumbling speech  Cardio: RRR, no murmur appreciated  Pulmonary: CTAB- anterior chest   GI: + BS, soft, midline incision, RLQ, LLQ TTP, no rebound/ no guarding . ZACHARY x 2 in place.    Laboratory    Results from last 7 days  Lab Units 01/21/17  0619 01/20/17  0426 01/19/17  0601   LN-WHITE BLOOD CELL COUNT thou/uL 21.0* 23.2* 29.3*   LN-HEMOGLOBIN g/dL 8.5* 8.4* 9.2*   LN-HEMATOCRIT % 25.8* 25.6* 28.0*   LN-PLATELET COUNT thou/uL 162 164 207          Results from last 7 days  Lab Units 01/21/17  0619 01/20/17  1544 01/20/17  0426 01/19/17  0601   LN-SODIUM mmol/L 143  143  --  146* 148*  148*   LN-POTASSIUM mmol/L 3.6  3.6 3.1* 3.3* 3.6  3.6   LN-CHLORIDE mmol/L 112*  112*  --  114* 117*  117*   LN-CO2 mmol/L 25  25  --  25 24  24   LN-BLOOD UREA NITROGEN mg/dL 20  20  --  22 17  17   LN-CREATININE mg/dL 0.90  0.90  --  0.84 0.79  0.79   LN-CALCIUM mg/dL 8.1*  8.1*  --  7.9* 8.1*  8.1*   LN-ALBUMIN g/dL 2.2*  --  2.4* 2.7*   LN-PROTEIN TOTAL g/dL 5.4*  --  5.2* 5.5*   LN-BILIRUBIN TOTAL mg/dL 5.3*  --  4.3* 5.1*   LN-ALKALINE PHOSPHATASE U/L 74  --  65 69   LN-ALT (SGPT) U/L 62*  --  41 37   LN-AST (SGOT) U/L 165*  --  124* 108*         Results from last 7 days  Lab Units 01/17/17  0442 01/16/17  0408 01/14/17  1122   LN-INR  1.68* 1.63* 1.48*            Patient Active Problem List   Diagnosis     Male Erectile Disorder     Eczema     Lower Back Pain     Insomnia     Vitamin D Deficiency     Shock circulatory     Hemorrhage, peritoneal     Acute blood loss anemia     Lactic acidosis     Alcohol withdrawal, uncomplicated     Respiratory failure, post-operative     Encephalopathy     Cognitive disorder      Compulsive behaviors       ASSESSMENT/ PLAN  Babak Wolff is a 61 y.o. male without significant known medical history except for alcoholism who came in with acute abdominal pain,  anemia and intra-abdominal bleeding on 1/9/17, eventually went for ex-lap on 1/11 and found to have bleeding pericolic varix that was ligated.  His course has been complicated by decompensated cirrhosis, hemorrhagic shock, alcohol withdrawal, and encephalopathy.  He is currently in the ICU with an NG in place.     1. Alcoholic liver disease with cirrhosis: MELD 17= 27% 3 mos mortality with recent ongoing severe etoh abuse, not liver transplant candidate due to active ETOH abuse. INR 1.6.  2. Ascites :  decreasing output, may respond to diuretics if not septic. Will hold on initiating for now but will have to keep up with ascites loses. Low threshold for IV albumin but creatinine normal.   3. Encephalopathy: multifactorial but some is definitely hepatic, now with sepsis, and on multiple sedatives  4. Hemoperitoneum : secondary to mesenteric varix s/p ligation without further bleeding  5. Alcohol withdrawal:  should be resolved by now  6. Malnutrition on TPN: Recent vomiting may be from infection that had caused increase in WBC as no bowel obstruction on CT. Agree with checking stool studies if stool.   7. Ileus : distended abdomen with increased tympany but significant decrease in NG output, Total 1/20 900mL.      Recommendation:   1. Agree with lactulose. Added rifaximin (1/20) FT as he did have asterixis despite low ammonia.   2. Low threshold to repeat Abd Xray to look for ileus.   3. Aggressive monitoring and treatment of infections, ID following  4. Limit sedatives  5. Can hopefully start PO diet soon, may need swallow eval  6. Consider starting aldactone in next few days if sepsis/infection improves.    This case was discussed with .    Judy Sandoval PA-C  [facility]  [phone]

## 2021-06-08 NOTE — PROGRESS NOTES
Infectious Disease Follow up Note:    Service: Infectious Disease   Note: Follow Up Note  Date: 2017    Chief Complaint: Follow up for fever and leukocytosis.     ASSESSMENT:  Babak Wolff is a 61 y.o. 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     Alcoholic hepatitis and hematoma are associated with leukocytosis     Principal Problem:  Shock circulatory  Active Problems:  Hemorrhage, peritoneal  Acute blood loss anemia  Lactic acidosis  Alcohol withdrawal, uncomplicated  Respiratory failure, post-operative  Encephalopathy  Cognitive disorder  Compulsive behaviors     No clear new source of infection at this time. At high risk for fungal infection given TPN, PICC and ESLD. Repeat CT recently not concerning for new infection. Fever and WBC improving on Meropenem + Vancomycin + Micafungin. Mental status better today.       Recommendations:  Continue Meropenem + Micafungin.  OK to stop vancomycin  Follow pending cultures and adjust therapy with cultures.   Discussed with Pharm D-- appreciate input. Vancomycin level was supratherapeutic  Discussed with Pulmonary and Critical Care attending, Dr. Milner. Appreciate input    Prognosis remains very poor.     Essence Putnam MD  [facility]   [phone] Option-2    ______________________________________________________________________  Notes / labs / vitals reviewed.    SUBJECTIVE / INTERVAL HISTORY: confused. Getting Ultrasound LE done. Not able to answer questions appropriately. In restraints.     ROS: A complete 12 point ROS is negative except as listed above.    PMHx/FHx/SHx: Reviewed. Interval changes noted.    OBJECTIVE:  Vitals:    17 1100   BP: 137/65   Pulse: (!) 119   Resp: 27   Temp: 99.1  F (37.3  C)   SpO2: 92%       Temp (24hrs), Av  F (37.8  C), Min:98.3  F (36.8  C), Max:101.6  F (38.7  C)    Estimated body mass index  "is 28.24 kg/(m^2) as calculated from the following:    Height as of this encounter: 6' 3\" (1.905 m).    Weight as of this encounter: 225 lb 15.5 oz (102.5 kg).      Gen: awake delirious  HEENMT:PERRLA, extra ocular movement intact and icteric sclera  NEURO: moves all extremities; wakes to name.   CARDIOVASCULAR: S1, S2 tachycardia  PULMONARY: Tachypneic; chest clear, no wheezing anteriorly, decreased at bases  GASTROINTESTINAL: midline incision clean and intact with staples; JPs x 2 with yellow fluid; firm, tenderness  MSK: All joints no effusion and tenderness.   INTEGUMENT: jaundice  PSYCH: delirium, in restraints    Antibiotics:  Meropenem  Vancomycin to be stopped on 1/21/17  Micafungin     Pertinent labs:      Results from last 7 days  Lab Units 01/21/17  0619 01/20/17  0426 01/19/17  0601 01/18/17  0501   LN-WHITE BLOOD CELL COUNT thou/uL 21.0* 23.2* 29.3* 14.7*   LN-HEMOGLOBIN g/dL 8.5* 8.4* 9.2* 8.5*   LN-HEMATOCRIT % 25.8* 25.6* 28.0* 25.7*   LN-PLATELET COUNT thou/uL 162 164 207 149   LN-NEUTROPHILS RELATIVE PERCENT % 79*  --  80* 71*   LN-MONOCYTES RELATIVE PERCENT % 8  --  9 12*   LN-MONOCYTES - REL (DIFF) %  --  9  --   --          Results from last 7 days  Lab Units 01/21/17  0619 01/20/17  1544 01/20/17  0426 01/19/17  0601   LN-SODIUM mmol/L 143  143  --  146* 148*  148*   LN-POTASSIUM mmol/L 3.6  3.6 3.1* 3.3* 3.6  3.6   LN-CHLORIDE mmol/L 112*  112*  --  114* 117*  117*   LN-CO2 mmol/L 25  25  --  25 24  24   LN-BLOOD UREA NITROGEN mg/dL 20  20  --  22 17  17   LN-CREATININE mg/dL 0.90  0.90  --  0.84 0.79  0.79   LN-CALCIUM mg/dL 8.1*  8.1*  --  7.9* 8.1*  8.1*   LN-ALBUMIN g/dL 2.2*  --  2.4* 2.7*   LN-PROTEIN TOTAL g/dL 5.4*  --  5.2* 5.5*   LN-BILIRUBIN TOTAL mg/dL 5.3*  --  4.3* 5.1*   LN-ALKALINE PHOSPHATASE U/L 74  --  65 69   LN-ALT (SGPT) U/L 62*  --  41 37   LN-AST (SGOT) U/L 165*  --  124* 108*       MICROBIOLOGY DATA:    Cultures reviewed    Culture, Blood Anaerobic Bottle " [[id number]] Collected: 01/21/17 1102        Order Status: Sent Specimen: Blood Updated: 01/21/17 1106       Culture, Blood Aerobic Bottle [[id number]] Collected: 01/21/17 1102       Order Status: Sent Specimen: Blood Updated: 01/21/17 1106       Culture, Blood Anaerobic Bottle [[id number]] Collected: 01/21/17 0842       Order Status: Sent Specimen: Blood from Central Venous Line Updated: 01/21/17 0857       Culture, Blood Aerobic Bottle [[id number]] Collected: 01/21/17 0842       Order Status: Sent Specimen: Blood from Central Venous Line Updated: 01/21/17 0857       Culture, Blood Anaerobic Bottle [[id number]] (Normal) Collected: 01/15/17 1416       Order Status: Completed Specimen: Blood Updated: 01/20/17 1809        Anaerobic Blood Culture Bottle No Growth        Culture, Blood Aerobic Bottle [[id number]] (Normal) Collected: 01/15/17 1416       Order Status: Completed Specimen: Blood Updated: 01/20/17 1809        Aerobic Blood Culture Bottle No Growth        Culture, Blood Anaerobic Bottle [[id number]] Collected: 01/19/17 1257       Order Status: Sent Specimen: Blood Updated: 01/19/17 1330       Culture, Blood Aerobic Bottle [[id number]] Collected: 01/19/17 1258       Order Status: Sent Specimen: Blood from Venipuncture: Arm, Left Updated: 01/19/17 1329       Culture, Fungus, Blood [[id number]] Collected: 01/19/17 1258       Order Status: Sent Specimen: Blood Updated: 01/19/17 1327       Culture, Blood Anaerobic Bottle [[id number]] Collected: 01/19/17 1251       Order Status: Sent Specimen: Blood from Venipuncture: Arm, Left Updated: 01/19/17 1305       Culture, Blood Aerobic Bottle [[id number]] Collected: 01/19/17 1251       Order Status: Sent Specimen: Blood Updated: 01/19/17 1305       Culture, Urine [[id number]] Collected: 01/18/17 1259       Order Status: Completed Specimen: Urine Updated: 01/19/17 1209        Culture No Growth         IMAGING/RADIOLOGY:  Reviewed
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
## 2021-06-08 NOTE — PROGRESS NOTES
ICU PROGRESS NOTE:    Assessment/Plan:  Babak Wolff is a 61 y.o. 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    NEURO:  Acute encephalopathy, agitated delirium: Multiple factors including possible hepatic encephalopathy, septic encephalopathy.  Ammonia on admission 17.  EtOH level negative on admission, unknown most recent EtOH use.    Recheck ammonia - only 34    Dexmedetomidine as needed    Will try olanzapine at     Psychiatry had recommended scheduled gabapentin - will schedule and clamp NG tube if tolerated    Assess for and treat causes of sepsis    Minimize benzos as able.    CVS:  No active issues.    monitor    RESP:  No active issues.    monitor    GI:  Hemoperitoneum, likely advanced liver disease/cirrhosis, alcohol dependence: Patient has had chronic intermittent abdominal pain.  Had minimized alcohol use as outpatient.  Brother and sister confirm that he is a severe alcoholic, after moving back here from [place] he had multiple empty 1.75-L vodka bottles throughout his home, would start drinking in the morning and was often intoxicated by mid-day; his live-in girlfriend has reportedly had to bring vodka bottles to work with her to prevent him from drinking them.  Now with hemoperitoneum, mesenteric varix, coagulopathy with INR 1.5-1.7, tbili 4-5, -120 (now higher likely reactive to acute infection), albumin in 2s.  CT with evidence of diffuse liver disease.    Ammonia as above, schedule lactulose as needed    RENAL:  Currently stable kidney function.    monitor    ID:  Hemoperitoneum, fever, neutrophilia: High risk of peritonitis, and high risk of fungal peritonitis/fungemia with hemoperitoneum, liver disease and TPN.  On meropenem  and vanco but temp up to 39.7 overnight, WBC up to 23.2. Continues to spike fevers and in the absence of a source these may be related to alcoholic hepatitis.    ID consult - appreciated    Continue meropenem, vancomycin and micafungin. Will likely DC Vancomycin.    US Venous doppler of extremities to rule out thrombophlebitis.    Repeat blood cultures (PICC and peripheral) are pending from 1/19    Fungal blood culture--pending from 1/19    HEMATOLOGIC:  Hemoperitoneum, anemia: Acute blood loss and anemia of sepsis/critical illness. Coagulopathy, likely due to cirrhosis, nutritional, consumptive    Monitor with restrictive transfusion threshold    Monitor INR prn    TPN for nutrition    ENDOCRINE:  No active issues.    Monitor    ICU PROPHYLAXIS:    SCDs    PPI    DISPO/CODE STATUS: FULL CODE.  Today (1/21/2017), the patient is critically ill with agitated delirium.    FAMILY COMMUNICATION: An extensive discussion with the patient's sister, 2 brothers, niece and girlfriend.  I updated them about the severity of his illness and explained to them that his disease condition is irreversible.  He also delineated how I was unsure if not the patient would survive this hospitalization and that his mortality based on his meld score was extremely high.  Family history the patient never actually been in a treatment program for his alcohol abuse and a hopeful that he may do so upon discharge.  I again reiterated that I was unclear if the patient was make it out of the hospital alive.  They expressed an understanding and asked appropriate question. I will update them again tomorrow.    Lines/Drains/Tubes:  TERRI PICC, Placed on 1/14/17  OG tube  Saeed catheter  ZACHARY peritoneal drains x 2    Overnight events:  Continuing to spike fevers.    Subjective:  Unable to answer yes/no questions.    Objective:  Physical Exam:  Vent settings for last 24 hours: none       Visit Vitals     /70     Pulse (!) 122     Temp 98.6  F (37  C)  "(Oral)     Resp (!) 33     Ht 6' 3\" (1.905 m)     Wt (!) 225 lb 15.5 oz (102.5 kg)     SpO2 94%     BMI 28.24 kg/m2       Intake/Output last 3 shifts:  I/O last 3 completed shifts:  In: 4355 [I.V.:349; NG/GT:390; IV Piggyback:1792]  Out: 5085 [Urine:1770; Emesis/NG output:1350; Drains:1965]  Intake/Output this shift:  I/O this shift:  In: -   Out: 175 [Emesis/NG output:75; Drains:100]    Physical Exam   Constitutional:   Chronically ill appearing gentleman, uncomfortable.   HENT:   Head: Normocephalic.   Eyes: Pupils are equal, round, and reactive to light. Scleral icterus is present.   Neck: Normal range of motion.   Cardiovascular:   Tachycardia, S1 and S2 audible.   Pulmonary/Chest:   Tachypnea, BL BS audible.   Abdominal:   Distended abdomen, no tenderness, drains noted with significant output. No BS appreciated.   Musculoskeletal: He exhibits edema.   Neurological:   Agitated.   Skin: Skin is warm.     Gen: somnolent, tearful, can say his name but difficulty answering other questions  HEENT: no OP lesions, no TOMMY  CV: RRR, no m/g/r  Resp: CTAB  Abd: soft, nontender, BS+  Neuro: PERRL, nonfocal  Ext: no edema    LAB:    Results from last 7 days  Lab Units 01/21/17  0619   LN-WHITE BLOOD CELL COUNT thou/uL 21.0*   LN-HEMOGLOBIN g/dL 8.5*   LN-HEMATOCRIT % 25.8*   LN-PLATELET COUNT thou/uL 162       Results from last 7 days  Lab Units 01/21/17  0619 01/20/17  1544 01/20/17  0426 01/19/17  0601   LN-SODIUM mmol/L 143  143  --  146* 148*  148*   LN-POTASSIUM mmol/L 3.6  3.6 3.1* 3.3* 3.6  3.6   LN-CHLORIDE mmol/L 112*  112*  --  114* 117*  117*   LN-CO2 mmol/L 25  25  --  25 24  24   LN-BLOOD UREA NITROGEN mg/dL 20  20  --  22 17  17   LN-CREATININE mg/dL 0.90  0.90  --  0.84 0.79  0.79   LN-CALCIUM mg/dL 8.1*  8.1*  --  7.9* 8.1*  8.1*   LN-PROTEIN TOTAL g/dL 5.4*  --  5.2* 5.5*   LN-BILIRUBIN TOTAL mg/dL 5.3*  --  4.3* 5.1*   LN-ALKALINE PHOSPHATASE U/L 74  --  65 69   LN-ALT (SGPT) U/L 62*  --  41 " 37   LN-AST (SGOT) U/L 165*  --  124* 108*         MAR reviewed.    ICU DAILY CHECKLIST                           Can patient transfer out of MICU? no    FAST HUG:    Feeding:  Feeding: TPN, OG to LIS.  Saeed:Yes  Analgesia/Sedation:Yes dexmedetomidine  Thromboembolic prophylaxis: yes; Mode:  SCDs  HOB>30:  Yes  Stress Ulcer Protocol Active: yes; Mode: H2 Antagonist  Glycemic Control: Any glucose > 180 yes; Mode of Insulin Therapy: Sliding Scale Insulin    INTUBATED:  Can patient have daily waking:  not applicable  Can patient have spontaneous breathing trial:  not applicable    Restraints? no    PHYSICAL THERAPY AND MOBILITY:  Can patient have PT and mobility trial: no  Activity: Bed Rest    Total Critical Care Time : 45 Minutes      Leigh DANIELS Alf  Pulmonary and Critical Care  1251

## 2021-06-08 NOTE — PROGRESS NOTES
Chart checking. Extubated on 1.15. Off precedex since 2pm today . ON TPN. On ertapenem and vancomycin. ICU team and psych following. Nothing much to offer from HMS standpoint. Will follow the patient peripherally as long as the patient needs ICU care.

## 2021-06-08 NOTE — PROGRESS NOTES
"Progress Note    Assessment/Plan   4 Days Post-Op    EXPLORATORY LAPAROTOMY, EVACUATION OF HEMAPERITONEUM and ASCITES, OVERSEWING MESENTERIC VARIX  Principal Problem:    Shock circulatory  Active Problems:    Hemorrhage, peritoneal    Acute blood loss anemia    Lactic acidosis    Alcohol withdrawal, uncomplicated    Respiratory failure, post-operative      Subjective  Patient is made some substantial progress today he was intubated this morning.  Yesterday he is ZACHARY drains were clamped as they were draining several liters of fluid from his abdomen on a daily basis and he seems to be tolerating that clamping of those drains to this point.  His hemoglobin is remaining stable.  Hemodynamically he's been stable.       Objective    Vital signs in last 24 hours  Temp:  [97.5  F (36.4  C)-98.5  F (36.9  C)] 98.5  F (36.9  C)  Heart Rate:  [] 91  Resp:  [10-27] 16  BP: (111)/(54) 111/54  Arterial Line BP: ()/(37-68) 125/44  FiO2 (%):  [25 %-35 %] 35 %  Weight:   (!) 255 lb 1.2 oz (115.7 kg)    Intake/Output last 3 shifts  I/O last 3 completed shifts:  In: 4432.1 [I.V.:3181.1; IV Piggyback:955]  Out: 2575 [Urine:1300; Emesis/NG output:500; Drains:775]  Intake/Output this shift:  I/O this shift:  In: 109 [I.V.:54; IV Piggyback:55]  Out: -     Review of Systems   Pertinent items are noted in HPI.    Physical Exam  Visit Vitals     /54     Pulse 91     Temp 98.5  F (36.9  C) (Axillary)     Resp 16     Ht 6' 3\" (1.905 m)     Wt (!) 255 lb 1.2 oz (115.7 kg)     SpO2 96%     BMI 31.88 kg/m2     General appearance: sedated  Abdomen: soft. hypoactive bs. drains with acites fluid. no blood    Pertinent Labs   Lab Results: personally reviewed.   Lab Results   Component Value Date     01/15/2017     01/15/2017    K 3.5 01/15/2017    K 3.5 01/15/2017     (H) 01/15/2017     (H) 01/15/2017    CO2 23 01/15/2017    CO2 23 01/15/2017    BUN 17 01/15/2017    BUN 17 01/15/2017    CREATININE 0.66 (L) " 01/15/2017    CREATININE 0.66 (L) 01/15/2017    CALCIUM 7.3 (L) 01/15/2017    CALCIUM 7.3 (L) 01/15/2017     Lab Results   Component Value Date    WBC 7.5 01/15/2017    WBC 8.6 01/14/2017    WBC 11.3 (H) 01/14/2017    WBC 6.1 06/16/2015    HGB 7.3 (L) 01/15/2017    HGB 7.4 (L) 01/14/2017    HGB 7.9 (L) 01/14/2017    HCT 21.2 (L) 01/15/2017    HCT 22.1 (L) 01/14/2017    HCT 23.0 (L) 01/14/2017    MCV 91 01/15/2017    MCV 90 01/14/2017    MCV 90 01/14/2017     (L) 01/15/2017     (L) 01/14/2017     (L) 01/14/2017

## 2021-06-08 NOTE — PROGRESS NOTES
Chart checking. ICU team, ID and psych following. Nothing much to offer from HMS standpoint. Will follow the patient peripherally as long as the patient needs ICU care.

## 2021-06-08 NOTE — PROGRESS NOTES
"Pharmacy Note: Total Parenteral Nutrition (TPN) Management    Pharmacist consulted to dose TPN for Babak Wolff, a 61 y.o. male by Dr. Shannon.    Subjective:    The patient is a new TPN start.    The patient was started on TPN in the hospital on 1/14/17.    Indication for TPN therapy: GI tract is not functional: Hemoperitoneum and Sepsis    Inadequate nutrition existing for > 7 days.     Enteral nutrition contraindicated due to: Hemoperitoneum.    Pertinent diseases and other special considerations hepatic failure    Social History   Substance Use Topics     Smoking status: Never Smoker     Smokeless tobacco: Former User     Types: Chew     Alcohol use 3.0 - 3.5 oz/week     6 - 7 drink(s) per week     Objective:    Height: 6' 3\" (1.905 m)    Weight: (!) 116.5 kg (256 lb 13.4 oz)    Body mass index is 32.1 kg/(m^2).    Patient Vitals for the past 96 hrs:   Weight   01/16/17 0015 (!) 116.5 kg (256 lb 13.4 oz)   01/15/17 0100 (!) 115.7 kg (255 lb 1.2 oz)   01/13/17 2344 (!) 116.3 kg (256 lb 6.3 oz)   01/13/17 0000 (!) 116.8 kg (257 lb 8 oz)       Labs:  Last 3 days:  Recent Labs      01/13/17   1414   01/14/17   0544  01/14/17   1122  01/14/17   1810  01/15/17   0419  01/15/17   1142  01/15/17   1840  01/16/17   0408  01/16/17   0751   NA   --    --   138  138   --   138  138   --    --   144   --    K   --    --   3.7  3.9   --   3.5  3.5  3.6   --   3.3*  3.5   CL   --    --   112*  113*   --   112*  112*   --    --   115*   --    CO2   --    --   21*  21*   --   23  23   --    --   22   --    BUN   --    --   20  19   --   17  17   --    --   14   --    CREATININE   --    --   0.68*  0.63*   --   0.66*  0.66*   --    --   0.72   --    GLU   --    --   136*  123   --   147*  147*   --    --   165*   --    CALCIUM   --    --   7.3*  7.1*   --   7.3*  7.3*   --    --   7.7*   --    MG   --    --   2.0  1.9   --   2.0   --    --   1.8   --    PHOS   --    --    --   2.7   --   2.7   --    --    --    --  "   PROT   --    --   4.5*  4.4*   --   4.5*   --    --   4.7*   --    ALBUMIN   --    --   2.5*  2.6*   --   2.5*   --    --   2.4*   --    PREALBUMIN   --    --    --   8.0*   --    --    --    --    --    --    TRIG   --    --    --   134   --    --    --    --    --    --    HGB  7.9*   --   7.9*   --   7.4*  7.3*   --   7.3*  7.7*   --    HCT   --    --   23.0*   --   22.1*  21.2*   --    --   23.4*   --    PLT   --    --   138*   --   121*  110*   --    --   123*   --    BILITOT   --    --   5.1*  5.0*   --   4.1*   --    --   4.3*   --    AST   --    --   111*  100*   --   83*   --    --   87*   --    ALT   --    --   37   --    --   32   --    --   32   --    ALKPHOS   --    < >  52  49   --   51   --    --   59   --    INR   --    --    --   1.48*   --    --    --    --   1.63*   --     < > = values in this interval not displayed.       Fingerstick Blood Glucose (past 48 hours):   Recent Labs      01/14/17   1932  01/14/17   2354  01/15/17   0419  01/15/17   0737  01/15/17   1132  01/15/17   1614  01/15/17   2001  01/15/17   2349 01/16/17   0410 01/16/17   0734   POCGLUFGR  119  167  187  178  157  179  171  166  188  174       Intake/Output (last 24 hours): I/O last 3 completed shifts:  In: 3358.5 [I.V.:1311.5; Other:72; IV Piggyback:360]  Out: 2750 [Urine:2750]    Estimated CrCl: Estimated Creatinine Clearance: 152 mL/min (by C-G formula based on Cr of 0.72).    Assessment:    Initiate patient on TPN therapy as a continuous central therapy.     Given the patient's current condition/oral intake, PN is still indicated.    Lab results reviewed: Electrolytes are near normal. Calcium is low, but when corrected for hypoalbuminemia it is 8.5, will order ionized calcium for tomorrow. Potassium is a little low and has been dropping over the last couple days, will increase in TPN today. Patient is acidotic, so will maximize acetate in the TPN.    Blood glucose levels have risen with the initiation of TPN, as  expected. TPN is now at goal rate for 24. Patient does have orders for Novolog and Lantus. Lantus dose was increased today so will not add any insulin to TPN     Patient is receiving IV thiamine and folic acid per [facility] protocol. Will consider adding this to TPN, if patient is not able to take any PO meds in the next day or 2 now that he is extubated.    Plan:  1. Rate of TPN: 75 mL/hr. Maintenance IV fluid rate will be adjusted appropriately to meet the total maximum IV fluids rate as ordered by provider.  2. Formula:     Amino Acids 7 %    Dextrose 20 %    Sodium 70 mEq/L    Potassium 35 mEq/L    Magnesium 5 mEq/L    Calcium 4.5 mEq/L    Phosphorus 12 mMol/L    Chloride: Acetate Ratio = Max Acetate    Standard Multivitamins    Trace Elements  3. Fat Emulsion: 250 ml, 4 times weekly on Sun, Tue, Thu, and Sat. Propofol infusion has been discontinued.  4. Check full TPN panel, ionized calcium, and INR tomorrow as ordered per protocol.  5. Pharmacist will continue to follow the patient's lab results, clinical status and blood glucose results and make adjustments as appropriate.    Thank you for the consult.  Kathleen London, PharmD 1/16/2017 9:44 AM

## 2021-06-08 NOTE — PROGRESS NOTES
"Pt drowsy on precedex. Has periods of restlessness, attempting to get oob, does not follow commands. Pt was given suppository earlier today. Smear of stool and mucus. When pt is restless, appears to be attempting to have bm, grunting, straining. Wants to \"pee\". Saeed cath draining. Gave miralax as ordered earlier, discussed with NP. New orders for fleets enema. Ok to give mom. Medicated for discomfort with fentanyl. Additional ativan given as precedex continues at 1.4mcg/kg/min. Continues to have contiuous output from ZACHARY drains, needing to be emptied [id number] to 1hour. Cont to monitor  "

## 2021-06-08 NOTE — PROGRESS NOTES
"  Clinical Nutrition Therapy Reassessment Note      Reason for Assessment:   Babak De Leon is a 61 y.o. male assessed by the registered Dietitian for nutrition support follow-up per protocol/pathway order.  Information obtained from chart.    Current Nutrition Prescription:   Diet: NPO    IV dextrose or Fluids:  dexmedetomidine 400 mcg/100 mL in NS (PRECEDEX) (4mcg/mL) Last Rate: 0.2 mcg/kg/hr (01/19/17 1204)   dextrose 10%    sodium chloride 0.45% Last Rate: 25 mL/hr (01/18/17 0713)   TPN - Custom formula Last Rate: 75 mL/hr at 01/18/17 1944   TPN - Custom formula      TPN: D20 AA8 @ 75 mls/hour   with 20% lipids 4x weekly provides for assessed needs:       1800 mls     2014 Kcals     360 gms CHO      144 gms PRO       29 grams FAT (daily average)    Anthropometrics:  Height: 6' 3\" (190.5 cm)  Weight: (!) 234 lb 2.1 oz (106.2 kg)  BMI (Calculated): 32  BMI indication: 30-34.9 obesity (class 1)    GI Status/Output:   Bowel Sounds present   Was constipated for 8 days with 1 bout of diarrhea yesterday.   C Diff ordered     Skin/Wound:  Bertin score Bertin Scale Score: 13  surgical wound & non pressure related wound was noted  ZACHARY peritoneal drains x 2    Medications:  Medications reviewed.    Labs:  Results for BABAK DE LEON (MRN [id number])   Ref. Range 1/14/2017 11:22 1/17/2017 04:42   Prealbumin Latest Ref Range: 19.0 - 38.0 mg/dL 8.0 (L) 5.8 (L)      Ref. Range 1/19/2017 06:01   Sodium Latest Ref Range: 136 - 145 mmol/L 148 (H)     Malnutrition Assessment: Patient at at risk for developing malnutrition related to acute illness due to edema, recent surgery, and reliance on tpn for nutrition     Nutrition Risk Level: stable-high risk     Nutrition dx: Altered GI function related to hemoperitoneum / ruptured mesenteric varix, liver cirrhosis with ascites AEB patient requires TPN nutrition support     Goal: Transition to po and taper off tpn, Maintain wt      Intervention: Continue TPN of D20 8AA @ 75cc/hr with IL " 4 times per week    Monitoring: Monitor weight,  TPN tolerance AEB lab indices, Nutrition POC

## 2021-06-08 NOTE — PROGRESS NOTES
"  Clinical Nutrition Therapy Reassessment Note      Reason for Assessment:   Babak Wolff is a 61 y.o. male assessed by the registered Dietitian for nutrition support. TPN re-ordering.     Current Nutrition Prescription:   Diet: NPO  Propofol Orders:  IV dextrose or Fluids:  dexmedetomidine 400 mcg/100 mL in NS (PRECEDEX) (4mcg/mL) Last Rate: 1 mcg/kg/hr (01/15/17 1132)   dextrose 10%    nacl 0.9% Last Rate: 25 mL/hr (01/15/17 0400)   TPN - Custom formula Last Rate: 50 mL/hr at 01/15/17 0400   TPN - Custom formula      TPN:  Dex 20%, AA 7% at 75 ml/hr.    Propofol not started.   Will write order to add 20% lipids 4 times per week per RD note of yesterday.     Anthropometrics:  Height: 6' 3\" (190.5 cm)  Weight: (!) 255 lb 1.2 oz (115.7 kg)  BMI (Calculated): 32    Skin/Wound:  Bertin score Bertin Scale Score: 13    Medications:  Medications reviewed.    Labs:  Labs reviewed: Glucose: 147    Goal: Meet estimated nutrition needs.             Tolerate TPN    Intervention: TPN as above    Monitoring: labs     See Care Plan for Problems, Goals, and Interventions.  "

## 2021-06-08 NOTE — PROGRESS NOTES
"RESPIRATORY CARE NOTE     Patient Name: Babak Wolff  Today's Date: 1/15/2017     Pt continues on the following settings:  Vent Mode: VCV  FiO2 (%):  [25 %-35 %] 35 %  S RR:  [14] 14  S VT:  [550 mL] 550 mL  PEEP/CPAP (cm H2O):  [5 cm H2O] 5 cm H2O  Minute Ventilation (L/min):  [7.6 L/min-11 L/min] 7.7 L/min  PIP:  [10 cm H2O-42 cm H2O] 14 cm H2O  WV SUP:  [5 cm H20] 5 cm H20  MAP (cm H2O):  [6-11] 7      Pt is intubated with  # 8.0 ETT secured  25 at the teeth. Pt. BS are coarse to clear with suction.  Pt has a strong cough with suction. RT suctioned pt for scant to small white. Pt's respiratory status is stable. RT will continue to follow per MD's orders.     Visit Vitals     /47 (Patient Position: Lying)     Pulse (!) 53     Temp 97.5  F (36.4  C) (Oral)     Resp 14     Ht 6' 3\" (1.905 m)     Wt (!) 255 lb 1.2 oz (115.7 kg)     SpO2 98%     BMI 31.88 kg/m2         Corrine M Trierweiler, LRT  "

## 2021-06-08 NOTE — PROGRESS NOTES
PULMONARY / CRITICAL CARE PROGRESS NOTE    Date / Time of Admission:  1/9/2017  7:44 AM    Assessment:   Principal Problem:    Shock circulatory  Active Problems:    Hemorrhage, peritoneal    Acute blood loss anemia    Lactic acidosis    Alcohol withdrawal, uncomplicated    Respiratory failure, post-operative    Encephalopathy    Cognitive disorder    Compulsive behaviors    Bleeding mesenteric varix  Coagulopathy  Liver cirrhosis  Hemorrhagic shock  Alcohol withdrawal with delirium  RLL nodule, 5mm       Advance Directives:  full      Plan:   Neuro:  Delirium - related to liver, sedation, possible etoh withdrawal.  Not a lot of evident anxiety or pain.  Focus on delirium treatment first.   -precedex, haldol, ativan as needed  Asked psychiatry to see    CVS:  Hypotension improved off sedation  Follow hgb and transfuse as needed to keep hgb>7    Pulm:  Extubated today after successful SBT  Repeat cxr today    GI:  Alcoholic liver cirrhosis. Bleeding mesenteric varix with hemoperitoneum  Post op now, bleeding is controlled  Changed abx to ertapenem since there was a question of diverticulitis - total 8 days  Protonix IV  TPN  History of malnutrition as is literally pouring fluid out of his ZACHARY drains which can't be helped at this point in time he was tapped for 7 L previously and is early at 4 L out this morning or overnight for a total of 11 L with significant protein loss surgery is following we'll await further input from them the plan is to keep the drains open at this point in time obviously winces cannot will be difficult.  Those sealed given how much fluid is coming to continue with that at that time    :  MODESTA.  Improving.   Good UO      Hem:  Hgb bid, keep > 7 now 7.7      Hgb down a little today. Keep monitoring.  INR looks fine. He is not bleeding.             ICU DAILY CHECKLIST                           Can patient transfer out of MICU? no    FAST HUG:    Feeding:  Feeding: No.  Patient is receiving NPO   tpn  Saeed:Yes  Analgesia/Sedation:Yes fentanyl and precedex ativan  Thromboembolic prophylaxis: yes; Mode:  SCDs  HOB>30:  Yes  Stress Ulcer Protocol Active: yes; Mode: PPI  Glycemic Control: Any glucose > 180 no; Mode of Insulin Therapy: Sliding Scale Insulin and Lantus    Restraints? yes    PHYSICAL THERAPY AND MOBILITY:  Can patient have PT and mobility trial: yes  Activity: Bed Rest    Care Time greater than: 35 minutes.  Multiple bedside assessments of respiratory and mental status      Subjective: less brittle mental status over night.  Did fine on SBT yesterday.  No secretions.   HPI:  Babak Wolff is a 61 y.o. old male with a history of hypertension and alcohol use. He states that he's been dealing with on and off abdominal pain for the past 3 months. He describes the pain as sudden onset and his lower abdomen. This morning he had the same amount of pain particularly worse also tachypneic, felt short of breath and when he tried to stand up he collapsed. She doesn't think she lost consciousness and he states that he just got weak and his knees. He states that his bili is only somewhat distended but it felt more firm today  Emergency room he had a CT of the abdomen that showed the large intraperitoneal hematoma. Hemoglobin was decreased from 12.5-7.1 compared to a few months ago. LFTs slightly elevated including bilirubin and his INR is 1.68. Contour of the liver was irregular.  Initially hypotensive with elevated lactic acid. After he received a liter of normal saline has blood pressure stabilized. After that 2 units of PRBCs were ordered.    1/16 overnight the patient is done fairly well.  He is extubated he is some having some delirium and tachypnea we are trying to wake him up.  Probably a combination of delirium and alcohol withdrawal at this point in time remains on Ativan and Precedex psychiatry is going to see him.  No evidence of ongoing bleeding he is having copious amounts of fluid from his JPs  secondary to cirrhosis Dr. JONES is following him    Principal Problem:    Shock circulatory  Active Problems:    Hemorrhage, peritoneal    Acute blood loss anemia    Lactic acidosis    Alcohol withdrawal, uncomplicated    Respiratory failure, post-operative    Encephalopathy    Cognitive disorder    Compulsive behaviors      Allergies: Review of patient's allergies indicates no known allergies.     MEDS:  Scheduled Meds:    chlorhexidine  15 mL Topical Q12H     ertapenem (INVanz) IV  1 g Intravenous Q24H     fat emulsion  250 mL Intravenous Once per day on Sun Tue Thu Sat     folic acid  1 mg Oral DAILY    Or     folic acid (FOLVITE) IVPB  1 mg Intravenous DAILY     gabapentin  250 mg Enteral Tube Q8H FIXED TIMES     insulin aspart (NovoLOG) injection   Subcutaneous Q6H FIXED TIMES     insulin glargine  10 Units Subcutaneous BID     ipratropium-albuterol         pantoprazole  40 mg Intravenous Q24H     potassium chloride  10 mEq Intravenous Q1H     senna-docusate  1 tablet Oral BID    Or     senna (SENOKOT) syrup  8.8 mg Enteral Tube BID     sodium chloride  10-30 mL Intravenous Q8H FIXED TIMES     thiamine  100 mg Oral DAILY    Or     thiamine (vitamin B1) IVPB  100 mg Intravenous DAILY     zolpidem  5 mg Enteral Tube QHS     Continuous Infusions:    dexmedetomidine 400 mcg/100 mL in NS (PRECEDEX) (4mcg/mL) 1 mcg/kg/hr (01/16/17 1028)     dextrose 10%       nacl 0.9% 10 mL/hr (01/16/17 0848)     TPN - Custom formula 75 mL/hr at 01/16/17 0810     TPN - Custom formula       PRN Meds:.acetaminophen, albuterol **AND** Nebulizer treatment intermittent, benzocaine-menthol, bisacodyl, dextrose 10%, dextrose 50 % (D50W), diphenhydrAMINE, fentaNYL pf, gabapentin, glucagon (human recombinant), haloperidol lactate, hydrALAZINE, LORazepam, magnesium hydroxide, naloxone **OR** naloxone, ondansetron **OR** ondansetron, oxyCODONE, polyvinyl alcohol, sodium chloride, sodium chloride, sodium chloride, Insert peripheral IV **AND**  "Saline lock IV **AND** sodium chloride      Objective:   VITALS:    Visit Vitals     /56     Pulse 87     Temp 98.8  F (37.1  C)     Resp (!) 29     Ht 6' 3\" (1.905 m)     Wt (!) 256 lb 13.4 oz (116.5 kg)     SpO2 (!) 82%     BMI 32.1 kg/m2     EXAM:  General appearance:  , . Jaundiced no change  Head: Normocephalic, without obvious abnormality, atraumatic  Lungs: clear to auscultation bilaterally  Heart: regular rate and rhythm, S1, S2 normal, no murmur, click, rub or gallop  Abdomen: s/p laparotomy, 2 ZACHARY drains in place. Abdomen is soft no rebound or guarding  Extremities: trace edema marta  Neurologic: Moves all extremities is not opening his eyes for me  Skin: warm to touch    I&O:      Intake/Output Summary (Last 24 hours) at 01/16/17 1100  Last data filed at 01/16/17 1021   Gross per 24 hour   Intake 3299.5 ml   Output   6750 ml   Net -3450.5 ml       Data Review:    CBC:   Lab Results   Component Value Date    WBC 8.3 01/16/2017    WBC 6.1 06/16/2015    RBC 2.56 (L) 01/16/2017     BMP:   Lab Results   Component Value Date    CO2 22 01/16/2017    BUN 14 01/16/2017    CREATININE 0.72 01/16/2017    CALCIUM 7.7 (L) 01/16/2017     Serum Glucose range:Invalid input(s): GLUCOSEPOCT      By:  Ry Pemberton, 1/16/2017, 1:11 PM    Primary Care Physician:  Shaheen Smiley MD                     "

## 2021-06-08 NOTE — PROGRESS NOTES
"Met with patients sister and girlfriend. Says they are interested in as hospice facility and prefer the [facility]. Say they think patient will be moving to a different room in the hospital and staying for \"4 more days\" before DCing. Girlfriend wondering if patient's insurance will cover room and board at [facility]. Araseli with [facility] notified, she plans on meeting with family this afternoon. Hospitalist notified also  "

## 2021-06-08 NOTE — PROGRESS NOTES
60 YO gentleman with alcoholic liver cirrhosis with continued alcohol abuse presenting with syncope. Found to have hemoperitoneum with tense abdomen. At risk of developing abdominal compartmental syndrome. Visceral angiogram showed no evidence of extravasation from the splenic, right hepatic and left hepatic artery. Received 4 units of pRBCs thus far. Intensivist actively managing. Gen surgery following. [facility] [place].

## 2021-06-08 NOTE — PROGRESS NOTES
[facility] Pulmonary/Critical Care    Describes mild pain in RUQ. Does not feel abdomen in more full or tense overnite. Denies nausea or cramping.   States that overall he feels much better following paracentesis.     Vitals:    01/11/17 0300 01/11/17 0315 01/11/17 0400 01/11/17 0500   BP: 137/62 132/63 113/55 116/64   Patient Position:       Pulse: 97 100 96 97   Resp: 27 25 21 26   Temp:  98.4  F (36.9  C) 98.5  F (36.9  C)    TempSrc:  Oral Oral    SpO2: 94% 94% 94% 93%   Weight:       Height:           Recent Results (from the past 24 hour(s))   Hemoglobin   Result Value Ref Range    Hemoglobin 8.5 (L) 14.0 - 18.0 g/dL   Hemoglobin   Result Value Ref Range    Hemoglobin 8.4 (L) 14.0 - 18.0 g/dL   Procalcitonin   Result Value Ref Range    Procalcitonin 1.18 (H) 0.00 - 0.49 ng/mL   Hemoglobin   Result Value Ref Range    Hemoglobin 7.8 (L) 14.0 - 18.0 g/dL   Hemoglobin   Result Value Ref Range    Hemoglobin 6.3 (LL) 14.0 - 18.0 g/dL   Protime-INR   Result Value Ref Range    INR 1.78 (H) 0.90 - 1.10   Fibrinogen   Result Value Ref Range    Fibrinogen 205 170 - 510 mg/dL   POCT Glucose   Result Value Ref Range    Glucose,  mg/dL   HM2(CBC w/o Differential)   Result Value Ref Range    WBC 18.3 (H) 4.0 - 11.0 thou/uL    RBC 2.35 (L) 4.40 - 6.20 mill/uL    Hemoglobin 6.9 (LL) 14.0 - 18.0 g/dL    Hematocrit 20.4 (L) 40.0 - 54.0 %    MCV 87 80 - 100 fL    MCH 29.5 27.0 - 34.0 pg    MCHC 34.0 32.0 - 36.0 g/dL    RDW 17.1 (H) 11.0 - 14.5 %    Platelets 188 140 - 440 thou/uL    MPV 7.2 7.0 - 10.0 fL     Abdomen: Soft, mild diffuse RUQ/Epigastric tenderness. Unchanged through out the evening.       Assessment/Plan  1) Acute anemia. Not adequately corrected following transfusion. Suspected intraabdominal hemorrhage. May have recurred following paracentesis and decompression. Remains hemodynamically stable. Transfuse PRBC. CTA abdomen.     Joy Delgado, CNP    Addendum: Creatinine elevated this AM. Acute kidney  injury- suspect related to fluid shifts. CT abdomen without contrast.

## 2021-06-08 NOTE — PROGRESS NOTES
Pharmacy Note - Admission Medication History    Pertinent Provider Information:      ______________________________________________________________________    Prior To Admission (PTA) med list completed and updated in EMR.       PTA Med List   Medication Sig Last Dose     amLODIPine (NORVASC) 10 MG tablet Take 10 mg by mouth daily. 1/9/2017 at am     aspirin 81 MG EC tablet Take 81 mg by mouth daily. 1/9/2017 at am     diclofenac (VOLTAREN) 75 MG EC tablet Take 75 mg by mouth 2 (two) times a day as needed. 1/9/2017 at am     diphenhydrAMINE-acetaminophen (TYLENOL PM EXTRA STRENGTH)  mg Tab Take 1-2 tablets by mouth bedtime as needed.  1/8/2017 at hs     glucosamine sulfate 500 mg cap Take 500 mg by mouth daily. 1/9/2017 at am     magnesium oxide 250 mg Tab Take 250 mg by mouth daily. 1/9/2017 at am     omeprazole (PRILOSEC) 10 MG capsule Take 10 mg by mouth Daily before breakfast. 1/9/2017 at am     traZODone (DESYREL) 150 MG tablet Take 150 mg by mouth bedtime as needed for sleep. 1/8/2017 at hs     zolpidem (AMBIEN) 5 MG tablet Take 5 mg by mouth bedtime as needed for sleep. 1/8/2017 at hs       Information source(s): Patient, Family member and Prescription bottles    Summary of Changes to PTA Med List  New: diclofenac, magnesium  Discontinued: duplicates, naproxen  Changed: re-entered amlodipine, omeprazole, trazodone, zolpidem    Patient was asked about OTC/herbal products specifically.  PTA med list reflects this.    Based on the pharmacist s assessment, the PTA med list information appears reliable    Patient appears compliant: Yes    Allergies were reviewed, assessed, and updated with the patient.      Thank you,  Swetha Wolf, PharmD  1/9/2017 10:43 AM

## 2021-06-08 NOTE — PROGRESS NOTES
GI Progress Note  Babak Wolff  [id number]    Subjective:     Unable to obtain reliable history from patient. Ongoing leukocytosis. Family conference took place today with initial plans for hospice.     Objective:     Vitals:    01/22/17 1100   BP: 144/76   Pulse: (!) 133   Resp: (!) 37   Temp: 99.1  F (37.3  C)   SpO2: 94%     Body mass index is 28.02 kg/(m^2).   Gen: No acute distress, NG, mumbling speech  Cardio: RRR, no murmur appreciated  Pulmonary: CTAB- anterior chest   GI: + BS, soft, midline incision, RLQ, LLQ TTP, no rebound/ no guarding . ZACHARY x 2 in place.    Laboratory    Results from last 7 days  Lab Units 01/22/17  0629 01/21/17  0619 01/20/17  0426   LN-WHITE BLOOD CELL COUNT thou/uL 20.3* 21.0* 23.2*   LN-HEMOGLOBIN g/dL 8.5* 8.5* 8.4*   LN-HEMATOCRIT % 26.3* 25.8* 25.6*   LN-PLATELET COUNT thou/uL 165 162 164          Results from last 7 days  Lab Units 01/22/17  0629 01/21/17  0619 01/20/17  1544 01/20/17  0426   LN-SODIUM mmol/L 146* 143  143  --  146*   LN-POTASSIUM mmol/L 3.7 3.6  3.6 3.1* 3.3*   LN-CHLORIDE mmol/L 113* 112*  112*  --  114*   LN-CO2 mmol/L 25 25  25  --  25   LN-BLOOD UREA NITROGEN mg/dL 25* 20  20  --  22   LN-CREATININE mg/dL 0.95 0.90  0.90  --  0.84   LN-CALCIUM mg/dL 8.1* 8.1*  8.1*  --  7.9*   LN-ALBUMIN g/dL 2.4* 2.2*  --  2.4*   LN-PROTEIN TOTAL g/dL 5.9* 5.4*  --  5.2*   LN-BILIRUBIN TOTAL mg/dL 5.6* 5.3*  --  4.3*   LN-ALKALINE PHOSPHATASE U/L 66 74  --  65   LN-ALT (SGPT) U/L 59* 62*  --  41   LN-AST (SGOT) U/L 143* 165*  --  124*         Results from last 7 days  Lab Units 01/17/17  0442 01/16/17  0408   LN-INR  1.68* 1.63*            Patient Active Problem List   Diagnosis     Male Erectile Disorder     Eczema     Lower Back Pain     Insomnia     Vitamin D Deficiency     Shock circulatory     Hemorrhage, peritoneal     Acute blood loss anemia     Lactic acidosis     Alcohol withdrawal, uncomplicated     Respiratory failure, post-operative      Encephalopathy     Cognitive disorder     Compulsive behaviors     Alcoholic hepatitis without ascites       ASSESSMENT/ PLAN  Babak Wolff is a 61 y.o. male without significant known medical history except for alcoholism who came in with acute abdominal pain,  anemia and intra-abdominal bleeding on 1/9/17, eventually went for ex-lap on 1/11 and found to have bleeding pericolic varix that was ligated.  His course has been complicated by decompensated cirrhosis, hemorrhagic shock, alcohol withdrawal, and encephalopathy.  He is currently in the ICU with an NG in place.     1. Alcoholic liver disease with cirrhosis: MELD 17= 27% 3 mos mortality with recent ongoing severe etoh abuse, not liver transplant candidate due to active ETOH abuse. INR 1.6.  2. Ascites :  decreasing output, may respond to diuretics if not septic. Will hold on initiating for now but will have to keep up with ascites loses. Low threshold for IV albumin but creatinine normal.   3. Encephalopathy: multifactorial but some is definitely hepatic, now with sepsis, and on multiple sedatives  4. Hemoperitoneum : secondary to mesenteric varix s/p ligation without further bleeding  5. Alcohol withdrawal:  should be resolved by now  6. Malnutrition on TPN: Recent vomiting may be from infection that had caused increase in WBC as no bowel obstruction on CT. Agree with checking stool studies if diarrhea.   7. Ileus : distended abdomen, NG output, Total 1/21 1650 mL.      Recommendation:   1. Agree with lactulose. Added rifaximin (1/20) FT as he did have asterixis despite low ammonia.   2. Low threshold to repeat Abd Xray to look for ileus.   3. Aggressive monitoring and treatment of infections, ID following  4. Limit sedatives  5. May need swallow eval, NG in place  6. Consider starting aldactone in next few days if sepsis/infection improves.    This case was discussed with .    Judy Sandoval PA-C  [facility]  [phone]

## 2021-06-08 NOTE — PROGRESS NOTES
Category: Non-violent   Type of Restraint: Soft limb x2.   Behavior: Pulling at IV and hidalgo catheter tubings.   Root cause of behavior: alcohol withdrawal   Less-restrictive methods that have failed: Redirection, reorientation. 1:1 NA at bedside.   Response to restraint: Not actively pulling at current restraints.   Criteria for release from restraint: Responds to redirection. Leaves medical devices in place.

## 2021-06-08 NOTE — PROGRESS NOTES
ICU PROGRESS NOTE:    Assessment/Plan:  Babak Wolff is a 61 y.o. 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    NEURO:  Acute encephalopathy, agitated delirium: Multiple factors including possible hepatic encephalopathy, septic encephalopathy.  Ammonia on admission 17.  EtOH level negative on admission, unknown most recent EtOH use.    Olanzapine at HS     continue scheduled gabapentin -      CVS:  No active issues.      RESP:  No active issues.      GI:  Hemoperitoneum, likely advanced liver disease/cirrhosis, alcohol dependence: Patient has had chronic intermittent abdominal pain.  Had minimized alcohol use as outpatient.  Brother and sister confirm that he is a severe alcoholic, after moving back here from [place] he had multiple empty 1.75-L vodka bottles throughout his home, would start drinking in the morning and was often intoxicated by mid-day; his live-in girlfriend has reportedly had to bring vodka bottles to work with her to prevent him from drinking them.  Now with hemoperitoneum, mesenteric varix, coagulopathy with INR 1.5-1.7, tbili 4-5, -120 (now higher likely reactive to acute infection), albumin in 2s.  CT with evidence of diffuse liver disease..    High meld score and not a transplant candidate because of ongoing alcohol abuse.  He continues to have worsening hepatic encephalopathy which does not appear to be responding to lactulose either.    Discontinue ng       Patient now comfort measures including no antibiotics, no TPN or blood sugar checks.   Will ask the palliative service to help with symptom management.     RENAL:  Currently stable kidney function.    ID:  Hemoperitoneum, fever, neutrophilia: High risk of peritonitis,  "and high risk of fungal peritonitis/fungemia with hemoperitoneum, liver disease and TPN.  On meropenem and vanco but temp up to 39.7 overnight, WBC up to 23.2. Continues to spike fevers and in the absence of a source these may be related to alcoholic hepatitis.    Discontinue antibiotics    HEMATOLOGIC:  Hemoperitoneum, anemia: Acute blood loss and anemia of sepsis/critical illness. Coagulopathy, likely due to cirrhosis, nutritional, consumptive    ENDOCRINE:  No active issues.      ICU PROPHYLAXIS:    SCDs      DISPO/CODE STATUS: DNR.  Changing course to full comfort measures    FAMILY COMMUNICATION: Had family conference this morning with the patient's family, significant other and hospice.  Reiterated Dr. Milner's discussion of the past few days that the patient has very advanced liver disease which is necessitating consideration of a transplant and give the patient's ongoing alcohol abuse he is presently not a candidate for transplant.  The family deciding on comfort measures    Lines/Drains/Tubes:  ABDIFATAHE PICC, Placed on 1/14/17  D/c ng  Saeed catheter  ZACHARY peritoneal drains x 2    Overnight events:  Continuing to spike low grade fevers.    Subjective:  Unable to answer yes/no questions.    Objective:  Physical Exam:  Vent settings for last 24 hours: none       Visit Vitals     /72     Pulse (!) 123     Temp 99.9  F (37.7  C) (Oral)     Resp (!) 30     Ht 6' 3\" (1.905 m)     Wt 218 lb 14.7 oz (99.3 kg)     SpO2 95%     BMI 27.36 kg/m2       Intake/Output last 3 shifts:  I/O last 3 completed shifts:  In: 4142 [P.O.:240; I.V.:639; Other:89; NG/GT:310]  Out: 2875 [Urine:1350; Emesis/NG output:600; Drains:925]  Intake/Output this shift:  I/O this shift:  In: 195 [I.V.:75; NG/GT:120]  Out: 125 [Drains:125]    Physical Exam   Constitutional:   Chronically ill appearing gentleman, uncomfortable.   HENT:   Head: Normocephalic.   Eyes: Pupils are equal, round, and reactive to light. Scleral icterus is present.   Neck: " Normal range of motion.   Cardiovascular:   Tachycardia, S1 and S2 audible.   Pulmonary/Chest:   Tachypnea, BL BS audible.   Abdominal:   Distended abdomen, no tenderness, drains noted with significant output. No BS appreciated.   Musculoskeletal: He exhibits edema.   Neurological:   Agitated.   Skin: Skin is warm.         LAB:    Results from last 7 days  Lab Units 01/23/17  0459 01/22/17  0629   LN-WHITE BLOOD CELL COUNT thou/uL 20.3* 20.3*   LN-HEMOGLOBIN g/dL 8.6* 8.5*   LN-HEMATOCRIT % 26.2* 26.3*   LN-PLATELET COUNT thou/uL  --  165       Results from last 7 days  Lab Units 01/23/17  0459 01/22/17  0629 01/21/17  0619   LN-SODIUM mmol/L 146* 146* 143  143   LN-POTASSIUM mmol/L 3.5 3.7 3.6  3.6   LN-CHLORIDE mmol/L 112* 113* 112*  112*   LN-CO2 mmol/L 26 25 25  25   LN-BLOOD UREA NITROGEN mg/dL 30* 25* 20  20   LN-CREATININE mg/dL 0.93 0.95 0.90  0.90   LN-CALCIUM mg/dL 8.3* 8.1* 8.1*  8.1*   LN-PROTEIN TOTAL g/dL 5.9* 5.9* 5.4*   LN-BILIRUBIN TOTAL mg/dL 5.5* 5.6* 5.3*   LN-ALKALINE PHOSPHATASE U/L 67 66 74   LN-ALT (SGPT) U/L 61* 59* 62*   LN-AST (SGOT) U/L 142* 143* 165*         MAR reviewed.    ICU DAILY CHECKLIST                           Can patient transfer out of MICU? yes    FAST HUG:    Feeding:  Feeding:  sips  Saeed:Yes  Analgesia/Sedation:Yes   Thromboembolic prophylaxis: yes; Mode:  SCDs  HOB>30:  Yes  Stress Ulcer Protocol Active: yes; Mode:   Glycemic Control: Any glucose > 180 yes; Mode of Insulin Therapy: comfort measures    INTUBATED:  Can patient have daily waking:  not applicable  Can patient have spontaneous breathing trial:  not applicable    Restraints? no    PHYSICAL THERAPY AND MOBILITY:  Can patient have PT and mobility trial: no  Activity: as tolerated    Total Time greater than 45 Minutes    Discussed with the family and the entire ICU team  Will transfer to BIBI Wagner CNP  Pulmonary and Critical Care

## 2021-06-08 NOTE — PROGRESS NOTES
"RESPIRATORY CARE NOTE     Patient Name: Babak Wolff  Today's Date: 1/14/2017     Pt continues on the following settings:  Vent Mode: VCV  FiO2 (%):  [30 %-40 %] 30 %  S RR:  [14] 14  S VT:  [550 mL-600 mL] 550 mL  PEEP/CPAP (cm H2O):  [5 cm H2O] 5 cm H2O  Minute Ventilation (L/min):  [7.5 L/min-9.4 L/min] 7.5 L/min  PIP:  [27 cm H2O-40 cm H2O] 34 cm H2O  MAP (cm H2O):  [8-11] 9      Pt is intubated with  # 8 ETT secured  25  at the teeth. BS are coarse to clear with suction. Pt has a  Strong cough with suction. RT suctioned pt for scant secretions. Pt's respiratory status is stable. RT will continue to follow per MD's orders.     Visit Vitals     /47 (Patient Position: Lying)     Pulse (!) 58     Temp 98  F (36.7  C) (Axillary)     Resp 14     Ht 6' 3\" (1.905 m)     Wt (!) 256 lb 6.3 oz (116.3 kg)     SpO2 97%     BMI 32.05 kg/m2         Corrine M Trierweiler, LRT  "

## 2021-06-08 NOTE — PROGRESS NOTES
"  RESPIRATORY CARE NOTE     Patient Name: Babak Wolff  Today's Date: 1/12/2017          Visit Vitals     /47     Pulse 67     Temp 99.2  F (37.3  C) (Oral)     Resp 15     Ht 6' 3\" (1.905 m)     Wt (!) 260 lb 9.6 oz (118.2 kg)     SpO2 95%     BMI 32.57 kg/m2         Vent Mode: VCV  FiO2 (%):  [40 %] 40 %  S RR:  [14] 14  S VT:  [600 mL] 600 mL  PEEP/CPAP (cm H2O):  [5 cm H2O] 5 cm H2O  Minute Ventilation (L/min):  [9.7 L/min-11.9 L/min] 10.2 L/min  PIP:  [12 cm H2O-30 cm H2O] 30 cm H2O  MAP (cm H2O):  [7-9] 9        Plan is to wean as tolarate, monitor vent.     Vivien Taylor, VIJIT  "

## 2021-06-08 NOTE — PROGRESS NOTES
Clinical Nutrition Therapy Reassessment Note Parenteral    Reason for Assessment:  nutrition support.    Current Nutrition Prescription:   Diet: NPO   TPN of D20  7AA @ 75cc/hr with 20% lipids 4 times per week  Supplements and Modulars:    Flush Orders:   Propofol Orders:off  IV dextrose or Fluids: 10cc/hr  nacl 0.9%       Current Nutrition Intake:    TPN line is central.  TPN provides 1945 kcals,  126 gms protein,. 384 gms CHO, and 1800cc flui    .    Total nutrient intake from all sources meets estimated nutritional needs.    Anthropometrics:  Most recent weight: 116.5 kg      GI Status/Output:       Bowel Sounds hypoactive    Skin/Wound:Bertin score  1      Medications:  Medications reviewed.    Labs:  Labs reviewed PAB 8.0   TG  134    Nutrition Dx  Inadequate oral po intake r/t GI surgery evidenced by TPN    Goals  Once gut working, transition back to po when ok with MD    Intervention  TPN    Monitoring  For bowel sounds , transition back to po          See Care Plan for further Problems, Goals, and Interventions.

## 2021-06-08 NOTE — PROGRESS NOTES
Chart check. Extubated on 1.15. Still on precedex . ICU team and psych following. Nothing much to offer from HMS standpoint. Will follow the patient peripherally as long as the patient needs ICU care.

## 2021-06-08 NOTE — PROGRESS NOTES
"Pharmacy Note: Total Parenteral Nutrition (TPN) Management    Pharmacist consulted to dose TPN for Babak Wolff, a 61 y.o. male by Dr. Shannon.    Subjective:    The patient is a new TPN start.    The patient was started on TPN in the hospital on 1/14/17.    Indication for TPN therapy: GI tract is not functional: Hemoperitoneum and Sepsis    Inadequate nutrition existing for > 7 days.     Enteral nutrition contraindicated due to: Hemoperitoneum.    Pertinent diseases and other special considerations hepatic failure    Social History   Substance Use Topics     Smoking status: Never Smoker     Smokeless tobacco: Former User     Types: Chew     Alcohol use 3.0 - 3.5 oz/week     6 - 7 drink(s) per week     Objective:    Height: 6' 3\" (1.905 m)    Weight: (!) 115.7 kg (255 lb 1.2 oz)    Body mass index is 31.88 kg/(m^2).    Patient Vitals for the past 96 hrs:   Weight   01/15/17 0100 (!) 115.7 kg (255 lb 1.2 oz)   01/13/17 2344 (!) 116.3 kg (256 lb 6.3 oz)   01/13/17 0000 (!) 116.8 kg (257 lb 8 oz)   01/12/17 0400 (!) 118.2 kg (260 lb 9.6 oz)       Labs:  Last 3 days:  Recent Labs      01/12/17   1622  01/12/17   2037   01/13/17   0400  01/13/17   1414  01/14/17   0544  01/14/17   1122  01/14/17   1810  01/15/17   0419   NA   --    --    --   134*   --   138  138   --   138  138   K   --    --    --   4.0   --   3.7  3.9   --   3.5  3.5   CL   --    --    --   111*   --   112*  113*   --   112*  112*   CO2   --    --    --   20*   --   21*  21*   --   23  23   BUN   --    --    --   26*   --   20  19   --   17  17   CREATININE   --    --    --   0.84   --   0.68*  0.63*   --   0.66*  0.66*   GLU   --    --    --   124   --   136*  123   --   147*  147*   CALCIUM   --    --    --   7.0*   --   7.3*  7.1*   --   7.3*  7.3*   MG   --    --    --   2.1   --   2.0  1.9   --   2.0   PHOS   --    --    --    --    --    --   2.7   --   2.7   PROT   --    --    --   4.0*   --   4.5*  4.4*   --   4.5*   ALBUMIN   --    " --    --   2.2*   --   2.5*  2.6*   --   2.5*   PREALBUMIN   --    --    --    --    --    --   8.0*   --    --    TRIG   --    --    --    --    --    --   134   --    --    HGB  7.8*  7.8*   --   7.9*  7.9*  7.9*   --   7.4*  7.3*   HCT   --    --    --   23.1*   --   23.0*   --   22.1*  21.2*   PLT   --    --    --   146   --   138*   --   121*  110*   BILITOT   --    --    --   5.4*   --   5.1*  5.0*   --   4.1*   AST   --    --    --   166*   --   111*  100*   --   83*   ALT   --    --    --   46*   --   37   --    --   32   ALKPHOS   --    --    < >  49   --   52  49   --   51   INR   --    --    --    --    --    --   1.48*   --    --     < > = values in this interval not displayed.       Fingerstick Blood Glucose (past 48 hours):   Recent Labs      01/13/17   2354  01/14/17   0401  01/14/17   0747  01/14/17   1228  01/14/17   1557  01/14/17   1932  01/14/17   2354  01/15/17   0419  01/15/17   0737  01/15/17   1132   POCGLUFGR  158  150  144  140  96  119  167  187  178  157       Intake/Output (last 24 hours): I/O last 3 completed shifts:  In: 4432.1 [I.V.:3181.1; IV Piggyback:955]  Out: 2575 [Urine:1300; Emesis/NG output:500; Drains:775]    Estimated CrCl: Estimated Creatinine Clearance: 165.3 mL/min (by C-G formula based on Cr of 0.66).    Assessment:    Initiate patient on TPN therapy as a continuous central therapy.     Given the patient's current condition/oral intake, PN is still indicated.    Lab results reviewed: Electrolytes are near normal. Calcium is low, but when corrected for hypoalbuminemia it is 8.5, so will not increase amount in TPN. Patient is acidotic, so will maximize acetate in the TPN.    Blood glucose levels have risen with the initiation of TPN, as expected. Patient does have orders for Novolog and Lantus. Will evaluate if insulin is needed in TPN tomorrow after 24h at goal rate.     Patient is receiving IV thiamine and folic acid per [facility] protocol. Will consider adding this to TPN,  if patient is not able to take any PO meds in the next day or 2 now that he is extubated.    Plan:  1. Rate of TPN: 75 mL/hr. Maintenance IV fluid rate will be adjusted appropriately to meet the total maximum IV fluids rate as ordered by provider.  2. Formula:     Amino Acids 7 %    Dextrose 20 %    Sodium 70 mEq/L    Potassium 30 mEq/L    Magnesium 5 mEq/L    Calcium 4.5 mEq/L    Phosphorus 12 mMol/L    Chloride: Acetate Ratio = Max Acetate    Standard Multivitamins    Trace Elements  3. Fat Emulsion: 250 ml, 4 times weekly on Sun, Tue, Thu, and Sat. Propofol infusion has been discontinued.  4. Check labs tomorrow as ordered per protocol.  5. Pharmacist will continue to follow the patient's lab results, clinical status and blood glucose results and make adjustments as appropriate.    Thank you for the consult.  Swetha Wolf, PharmD 1/15/2017 12:02 PM

## 2021-06-08 NOTE — PROGRESS NOTES
"  RESPIRATORY CARE NOTE     Patient Name: Babak Wolff  Today's Date: 1/12/2017     Visit Vitals     BP (!) 104/38     Pulse 71     Temp (!) 101.4  F (38.6  C)  Comment: ice packs applied     Resp 14     Ht 6' 3\" (1.905 m)     Wt (!) 260 lb 9.6 oz (118.2 kg)     SpO2 97%     BMI 32.57 kg/m2     Vent Mode: VCV  FiO2 (%):  [40 %-50 %] 40 %  S RR:  [14-16] 14  S VT:  [600 mL] 600 mL  PEEP/CPAP (cm H2O):  [5 cm H2O] 5 cm H2O  Minute Ventilation (L/min):  [9.4 L/min-16.7 L/min] 10.2 L/min  PIP:  [10 cm H2O-34 cm H2O] 22 cm H2O  MAP (cm H2O):  [6-11] 8    Pt. remains on full vent support, settings above, ETT 8.0 25 @teeth, BS clear/ coarse, suctioning small amount of white thick secretions via ETT, plats 14, peaks 20-22, no wean today per MD, RT following     Suzy Rabago LRT  "

## 2021-06-08 NOTE — PROGRESS NOTES
01/11/17 2131   Vent Information   Vent Mode VCV   Vent Settings   Resp Rate (Set) 14   FiO2 (%) 40 %   Vt (Set, mL) 600 mL   PEEP/CPAP (cm H2O) 5 cm H2O   Insp Flow (L/min) 50 L/min   Patient Data   Vt Exp (mL) 616 mL   Minute Ventilation (L/min) 10.6 L/min   Total Resp Rate  17 BPM   PIP Observed (cm H2O) 19 cm H2O   MAP (cm H2O) 8   SpO2 96 %   Heart Rate 69   Alarms   High Resp Rate 35   Low PEEP 3 cm H2O   Insp Pressure High (cm H2O) 45 cm H2O   Insp Pressure Low (cm H2O) 10 cm H2O   MV High (L/min) 18 L/min   MV Low (L/min) 4 L/min   Vt Low (mL) 300 mL     RR setting is adjusted to according to CCP. Rt will cont to follow up

## 2021-06-08 NOTE — PROGRESS NOTES
PROGRESS NOTE    HPI:    61 y.o. 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 Now comfort and transfer out of ICU today.     Interval History:    Patient seen and examine at bedside. Confuse. No overnight event reported. Discussed with Hospice and change to comfort care.     Physical Examination:    Temp:  [98.1  F (36.7  C)-99.9  F (37.7  C)] 98.1  F (36.7  C)  Heart Rate:  [114-128] 123  Resp:  [23-35] 30  BP: (129-166)/(65-81) 133/66    GEN: Awake, Not in distress. confused  CVS: S1, S2  LUNGS: CTA BL.  ABD: BS (+), Soft, NT, surgical site clean, no active discharge noted  EXT: No edema  NEURO: Awake, move all extremities grossly., confuse    Medications:      Current Facility-Administered Medications:      acetaminophen suppository 325 mg (TYLENOL), 325 mg, Rectal, Q4H PRN, Chi Watson MD, 325 mg at 01/20/17 0051     albuterol nebulizer solution 2.5 mg (PROVENTIL), 2.5 mg, Nebulization, Q4H PRN **AND** Nebulizer treatment intermittent, , , Q4H PRN, Joy Delgado CNP     benzocaine-menthol lozenge 1 lozenge (CEPACOL), 1 lozenge, Oral, Q1H PRN, Chi Watson MD     bisacodyl suppository 10 mg (DULCOLAX), 10 mg, Rectal, Daily PRN, Chi Watson MD, 10 mg at 01/21/17 0325     gabapentin 250 mg/5 mL solution 300 mg (NEURONTIN), 300 mg, Oral, Q8H FIXED TIMES, Priya Wagner CNP, 300 mg at 01/23/17 1443     gabapentin 250 mg/5 mL solution 500 mg (NEURONTIN), 500 mg, Enteral Tube, BID PRN, Sarah K Kenny CNP, 500 mg at 01/23/17 1102     glucagon (human recombinant) injection 1 mg, 1 mg, Subcutaneous, PRN, Chi Watson MD     HYDROmorphone 1 mg/1 mL solution 0.5-1 mg (DILAUDID), 0.5-1 mg, Oral, Q15 Min PRN, Priya  MAIA Wagner CNP     HYDROmorphone injection 0.5-1 mg (DILAUDID), 0.5-1 mg, Intravenous, Q15 Min PRN, Priya Wagner CNP     LORazepam injection 1-2 mg (ATIVAN), 1-2 mg, Intravenous, Q4H PRN, Joy Delgado CNP, 1 mg at 01/21/17 1719     magnesium hydroxide suspension 30 mL (MILK OF MAG), 30 mL, Oral, Daily PRN, Chi Watson MD, 30 mL at 01/16/17 1949     menthol-zinc oxide 0.44-20.6 % ointment (CALMOSEPTINE), , Topical, QID PRN, Gela Pinon, 1 application at 01/23/17 1448     nystatin 100,000 unit/mL suspension 500,000 Units (MYCOSTATIN), 5 mL, Swish & Swallow, QID, Gela Pinon, 500,000 Units at 01/23/17 1444     OLANZapine tablet 5 mg (zyPREXA), 5 mg, Enteral Tube, QHS, Johnnie Lockett MD, 5 mg at 01/22/17 2000     ondansetron injection 4 mg (ZOFRAN), 4 mg, Intravenous, Q4H PRN, 4 mg at 01/18/17 1720 **OR** ondansetron tablet 8 mg (ZOFRAN), 8 mg, Oral, Q8H PRN, Chi Watson MD     oxyCODONE 5 mg/5 mL solution 5-10 mg (ROXICODONE), 5-10 mg, Oral, Q1H PRN, Priya Wagner CNP     oxyCODONE immediate release tablet 5 mg (ROXICODONE), 5 mg, Oral, Q4H PRN, Chi Watson MD, 5 mg at 01/20/17 0457     polyvinyl alcohol 1.4 % ophthalmic solution 1 drop (LIQUIFILM TEARS), 1 drop, Both Eyes, Q2H PRN, Priya Wagner CNP     scopolamine 1.5 mg (1 mg over 3 days) transdermal patch 1 patch (TRANSDERM-SCOP), 1 patch, Transdermal, Q72H PRN, Priya Wagner CNP     senna-docusate 8.6-50 mg tablet 1 tablet (PERICOLACE), 1 tablet, Oral, BID, 1 tablet at 01/18/17 0832 **OR** sennosides syrup 8.8 mg (for SENOKOT), 8.8 mg, Enteral Tube, BID, Chi Watson MD, 8.8 mg at 01/17/17 1111     sodium chloride 0.9 % flush 10-20 mL (NS), 10-20 mL, Intravenous, PRN, Afl Shannon MD, 20 mL at 01/20/17 1546     sodium chloride 0.9 % flush 10-30 mL (NS), 10-30 mL, Intravenous, PRN, Alf Shannon MD     sodium chloride 0.9 % flush 10-30 mL (NS), 10-30 mL, Intravenous, Q8H FIXED TIMES, Alf Amaya  MD Shiva, 10 mL at 01/23/17 1443     sodium chloride 0.9 % flush 20 mL (NS), 20 mL, Intravenous, PRN, Alf Shannon MD     Insert peripheral IV, , , Once **AND** Saline lock IV, , , Once **AND** sodium chloride 0.9 % flush 3 mL (NS), 3 mL, Intravenous, PRN, Spencer Mayorga MD, 10 mL at 01/11/17 1417     sodium phosphates 133 mL rectal enema 1 enema (for FLEET), 1 enema, Rectal, Daily PRN, Gela Pinon, 1 enema at 01/16/17 2115     TPN - Custom formula, , Intravenous, TPN - see admin instructions, Leigh Milner MD, Last Rate: 75 mL/hr at 01/22/17 2340     Scheduled Meds:    gabapentin  300 mg Oral Q8H FIXED TIMES     nystatin  5 mL Swish & Swallow QID     OLANZapine  5 mg Enteral Tube QHS     senna-docusate  1 tablet Oral BID    Or     senna (SENOKOT) syrup  8.8 mg Enteral Tube BID     sodium chloride  10-30 mL Intravenous Q8H FIXED TIMES     Continuous Infusions:    TPN - Custom formula 75 mL/hr at 01/22/17 2340     PRN Meds:.acetaminophen, albuterol **AND** Nebulizer treatment intermittent, benzocaine-menthol, bisacodyl, gabapentin, glucagon (human recombinant), HYDROmorphone (DILAUDID) oral solution 1 mg/mL, HYDROmorphone, LORazepam, magnesium hydroxide, menthol-zinc oxide, ondansetron **OR** ondansetron, oxyCODONE, oxyCODONE, polyvinyl alcohol, scopolamine, sodium chloride, sodium chloride, sodium chloride, Insert peripheral IV **AND** Saline lock IV **AND** sodium chloride, sodium phosphates 133 mL      Labs:       Results from last 7 days  Lab Units 01/23/17  0459 01/22/17  0629 01/21/17  0619 01/20/17  0426   LN-WHITE BLOOD CELL COUNT thou/uL 20.3* 20.3* 21.0* 23.2*   LN-HEMOGLOBIN g/dL 8.6* 8.5* 8.5* 8.4*   LN-HEMATOCRIT % 26.2* 26.3* 25.8* 25.6*   LN-PLATELET COUNT thou/uL  --  165 162 164   LN-NEUTROPHILS RELATIVE PERCENT % 81* 78* 79*  --    LN-MONOCYTES RELATIVE PERCENT % 5 8 8  --    LN-MONOCYTES - REL (DIFF) %  --   --   --  9       Results from last 7 days  Lab Units 01/23/17  0459  01/22/17  0629 01/21/17 0619   LN-SODIUM mmol/L 146* 146* 143  143   LN-POTASSIUM mmol/L 3.5 3.7 3.6  3.6   LN-CHLORIDE mmol/L 112* 113* 112*  112*   LN-CO2 mmol/L 26 25 25  25   LN-BLOOD UREA NITROGEN mg/dL 30* 25* 20  20   LN-CREATININE mg/dL 0.93 0.95 0.90  0.90   LN-CALCIUM mg/dL 8.3* 8.1* 8.1*  8.1*   LN-PHOSPHORUS mg/dL 3.0 2.8 2.5  2.5       Results from last 7 days  Lab Units 01/23/17  0459 01/22/17 0629 01/21/17 0619   LN-MAGNESIUM mg/dL 2.2 2.1 2.0  2.0        Recent Results (from the past 24 hour(s))   POCT Glucose    Collection Time: 01/22/17  5:58 PM   Result Value Ref Range    Glucose,  mg/dL   POCT Glucose    Collection Time: 01/22/17 11:36 PM   Result Value Ref Range    Glucose,  mg/dL   Comprehensive Metabolic Panel    Collection Time: 01/23/17  4:59 AM   Result Value Ref Range    Sodium 146 (H) 136 - 145 mmol/L    Potassium 3.5 3.5 - 5.0 mmol/L    Chloride 112 (H) 98 - 107 mmol/L    CO2 26 22 - 31 mmol/L    Anion Gap, Calculation 8 5 - 18 mmol/L    Glucose 200 (H) 70 - 125 mg/dL    BUN 30 (H) 8 - 22 mg/dL    Creatinine 0.93 0.70 - 1.30 mg/dL    GFR MDRD Af Amer >60 >60 mL/min/1.73m2    GFR MDRD Non Af Amer >60 >60 mL/min/1.73m2    Bilirubin, Total 5.5 (H) 0.0 - 1.0 mg/dL    Calcium 8.3 (L) 8.5 - 10.5 mg/dL    Protein, Total 5.9 (L) 6.0 - 8.0 g/dL    Albumin 2.3 (L) 3.5 - 5.0 g/dL    Alkaline Phosphatase 67 45 - 120 U/L     (H) 0 - 40 U/L    ALT 61 (H) 0 - 45 U/L   Magnesium    Collection Time: 01/23/17  4:59 AM   Result Value Ref Range    Magnesium 2.2 1.8 - 2.6 mg/dL   Phosphorus    Collection Time: 01/23/17  4:59 AM   Result Value Ref Range    Phosphorus 3.0 2.5 - 4.5 mg/dL   Calcium, Ionized, Measured    Collection Time: 01/23/17  4:59 AM   Result Value Ref Range    Calcium, Ionized Measured 1.13 1.11 - 1.30 mmol/L    Calcium, Ionized pH 7.4 1.19 1.11 - 1.30 mmol/L    pH 7.53 (H) 7.35 - 7.45   HM1 (CBC with Diff)    Collection Time: 01/23/17  4:59 AM   Result  Value Ref Range    WBC 20.3 (H) 4.0 - 11.0 thou/uL    RBC 2.94 (L) 4.40 - 6.20 mill/uL    Hemoglobin 8.6 (L) 14.0 - 18.0 g/dL    Hematocrit 26.2 (L) 40.0 - 54.0 %    MCV 89 80 - 100 fL    MCH 29.2 27.0 - 34.0 pg    MCHC 32.7 32.0 - 36.0 g/dL    RDW 19.5 (H) 11.0 - 14.5 %    Platelets  140 - 440 thou/uL    MPV  7.0 - 10.0 fL    Neutrophils % 81 (H) 50 - 70 %    Lymphocytes % 12 (L) 20 - 40 %    Monocytes % 5 2 - 10 %    Eosinophils % 1 0 - 6 %    Basophils % 2 0 - 2 %    Neutrophils Absolute 16.4 (H) 2.0 - 7.7 thou/uL    Lymphocytes Absolute 2.4 0.8 - 4.4 thou/uL    Monocytes Absolute 1.0 (H) 0.0 - 0.9 thou/uL    Eosinophils Absolute 0.2 0.0 - 0.4 thou/uL    Basophils Absolute 0.4 (H) 0.0 - 0.2 thou/uL   Manual Differential    Collection Time: 01/23/17  4:59 AM   Result Value Ref Range    Platelet Estimate Normal Normal    Ovalocytes 1+ (!) Negative    Polychromasia 1+ (!) Negative    Tear Drop Cells 1+ (!) Negative    Target Cells 1+ (!) Negative   POCT Glucose    Collection Time: 01/23/17  5:00 AM   Result Value Ref Range    Glucose,  mg/dL   POCT Glucose    Collection Time: 01/23/17 11:55 AM   Result Value Ref Range    Glucose,  mg/dL         I have reviewed all the lab results.         Assessment and Plan:    Acute encephalopathy: Multiple factors including possible hepatic encephalopathy, septic encephalopathy. Comfort care    Hemorrhagic/hypovolemic shock: likely from hemoperitoneum. Comfort care.     Hemoperitoneum: likely advanced liver disease/cirrhosis, alcohol dependence. Comfort care    Possible peritonitis: off antibiotic now for patient is comfort care.     ABL anemia: likely from hemoperitoneum: comfort care.     ETOH abuse: comfort care    Case discussed with RN, patient and note from ICU, ID and GI and surgery reviewed regarding management and plan.     Total time spent 36 minutes, more than 50% time spent in counseling and coordination of care.    Marilyn Batista MD  [facility]    Pager: [phone]   Office: [phone]

## 2021-06-08 NOTE — DISCHARGE SUMMARY
Physician Discharge Summary    Primary Care Physician: Shaheen Smiley MD    Discharge Provider: Soheila Mills MD    Admission Date: 1/9/2017. Admission Diagnoses: Lactic acidosis [E87.2]  Hemoperitoneum [K66.1]  Liver disease [K76.9]  Anemia [D64.9]  Tachycardia [R00.0]  Hypotension [I95.9]  Bleeding [R58]   Discharge Date and Time: 1/25/2017    Disposition: [facility]  Condition at Discharge: Serious  Code Status: DNR, changed during this admission     Principal Diagnosis:  Shock circulatory  Discharge Diagnoses:  Principal Problem:    Shock circulatory  Active Problems:    Hemorrhage, peritoneal    Acute blood loss anemia    Lactic acidosis    Alcohol withdrawal, uncomplicated    Respiratory failure, post-operative    Encephalopathy    Cognitive disorder    Compulsive behaviors    Alcoholic hepatitis without ascites    Leukocytosis    Hospital Course:   Babak Woflf is a 61 y.o. male with what is now known to be alcoholic liver cirrhosis who presented to our facility for evaluation of syncope. He was found to have bleeding mesenteric varix resulting in massive hemorrhage into abdomen. He was in ICU for several days. He has severe liver failure and very poor prognosis. Patient and family elected Hospice and he will discharge to \[facility]\"" today on Hospice.    Ruptured mesenteric varix resulting in hypovolemic shock: Presented with syncope, found to be anemic with ascites and large intraabdominal bleed. IR angiography was attempted, no bleed source found. Bleed seemed to have stopped spontaneously. He was stable in the ICU. He underwent paracentesis due to tense abdomen, then dropped his Hgb and was found to have increased hemoperitoneum on CT. Went to OR 1/11 for ex lap and found to have large bleeding pericolic varix which was oversewn. He was in the ICU on pressors for a few days after surgery and received a total of 16 units of pRBCs. Because of multiple other issues he is now on Hospice.   - We  removed every-other staple yesterday as he was having itching due to them. Remaining staples could be removed in 3-4 days IF wound healing well.   - Ice pack PRN for itching. Could try something like topical benadryl but did not order as may be difficult to apply with steri-strips in place.     Fever, leukocytosis: There was question of diverticulitis, of course he is also at risk for SBP. Despite maximal therapy he continued to have fevers and leukocytosis. Decision was made for comfort cares transitioning to hospice.  - Off antibiotics. He has actually remained afebrile and seems clinically stable at present though prognosis for his other issues remains very poor.     Large volume ascites: He has 2 ZACHARY drains in place to take tension off abdominal wall so that laparotomy incision can heal. Drains will remain in place indefinitely and have been needing to be emptied frequently.     Alcoholism with withdrawal: Acute alcohol withdrawal resolved. He had to be sedated with precedex, intubated for several days in ICU. Extubated 1/15. He did need precedex for several more days for agitation, now off since 1/19.     Agitation: Likely due to acute illness, hepatic encephalopathy. Has required precedex for several days. Now with scheduled gabapentin, olanzapine is doing ok and able to have nice visit with family members today. PRN Ativan. Given that symptoms are controlled did not start lactulose but this could be considered if he has worsening confusion.     Nutrition: He received TPN for several days, now off. Regular diet ad clarence.     Hospice status    Consult/s: pulmonary/intensive care, ID, GI, psychiatry and general surgery  Significant Diagnostic Studies:   Recent Results (from the past 48 hour(s))   POCT Glucose    Collection Time: 01/23/17 11:55 AM   Result Value Ref Range    Glucose,  mg/dL     Ct Abdomen Pelvis Without Oral Without Iv Contrast    Result Date: 1/11/2017  CT ABDOMEN PELVIS WO ORAL WO IV  CONTRAST 1/11/2017 6:09 AM INDICATION: Anemia TECHNIQUE: Routine CT abdomen and pelvis without oral or IV contrast. Multiplanar reformation images (MPR). Dose reduction techniques were used. COMPARISON: 1/19/2017 FINDINGS: LUNG BASES: Coronary artery calcifications. Hypodense ventricular blood pool compared to the myocardium, consistent with anemia. Dependent bibasilar atelectasis. ABDOMEN: Redemonstrated hemoperitoneum throughout the abdomen and pelvis, with increase in hyperdense clot adjacent to the inferior pole of the spleen and inferior right liver lobe. Hyperdense clot also noted in the inferior aspect of the right paracolic  gutter and in the pelvis. Diffusely abnormal attenuation of the liver, with scattered hypodense lesions as discussed previously. No interval change. No splenomegaly. Normal pancreas and adrenals. No hydronephrosis. Nonobstructing 4 mm left midpole renal stone. There is also hyperdensity within the gallbladder consistent with vicarious excretion of contrast. Nonspecific omental stranding, likely pertaining to hemoperitoneum. No bowel obstruction or free air. Nonvisualized appendix. Atherosclerotic calcification of the abdominal aorta and iliac arteries without aneurysm. PELVIS: Hemoperitoneum with dense clot layering dependently. Urinary bladder is decompressed via Saeed catheter. No adenopathy or mass. MUSCULOSKELETAL: Stable degenerative changes of the visualized spine and hips.     CONCLUSION: 1.  Large volume hemoperitoneum, with increase in hyperdense clot noted in the perihepatic and perisplenic regions as well as the right paracolic gutter and pelvis. No source of bleeding can be identified on this noncontrast examination. 2.  Diffusely heterogeneous attenuation of the liver parenchyma consistent with diffuse parenchymal disease. Scattered hypodense lesions are incompletely characterized on this study. 3.  Nonobstructing left midpole renal stone. 4.  Aortoiliac atherosclerosis.  Coronary artery disease.    Cta Chest Abdomen Pelvis    Result Date: 1/9/2017  CTA CHEST ABDOMEN PELVIS 1/9/2017 9:19 AM INDICATION: Abdominal pain, tachycardia, hypotension. Evaluate for aortic dissection. TECHNIQUE: Multiphase helical acquisition through the chest, abdomen, and pelvis was performed including noncontrast, arterial phase, and delayed images before and after the administration of IV contrast. 2D and 3D reconstructions were performed by the CT technologist. Dose reduction techniques were used. IV CONTRAST: Iohexol (Omni) 75mL COMPARISON: None. FINDINGS: CT ANGIOGRAM CHEST, ABDOMEN, AND PELVIS: No evidence aortic dissection or aneurysm. Mild to moderate calcific arterial plaque. Coronary artery calcification. CHEST: LUNGS AND PLEURA: Dependent atelectasis in both lower lobes. Slight emphysema and mild fibrosis in the upper lobes. 5 mm circumscribed nodule right lower lobe on series 4 image 48. No pleural effusion. MEDIASTINUM: Negative. ABDOMEN: There is large amount of free fluid with likely clot along the spleen and left paracolic gutter, suggesting hemoperitoneum. There is some soft tissue stranding in the omental fat which could represent inflammatory change or a less likely neoplastic infiltration. There is heterogeneous decreased density within the liver suggesting diffuse parenchymal disease, and potentially heterogeneous steatosis. There is mild irregularity to the contour of the liver and the a component of hepatic fibrosis or cirrhosis is also  possible. There are irregular hypodense foci within the right and left lobes which are of indeterminate etiology. Largest area measures approximately 1.5 x 3 cm. Gallbladder, pancreas, spleen, adrenal glands, right kidney negative. There is likely a small accessory splenule adjacent to the spleen. 4 mm nonobstructing stone left kidney. Incidental retroaortic left renal vein. There are few mildly dilated vessels near the stomach and pancreas and in the  retroperitoneum on the left which could represent mild varices, there are also a few prominent lymph nodes in this region.. PELVIS: Large amount of free fluid with likely dependent clot. Colonic diverticula, no diverticulitis. No abnormal bowel distention. Appendix is not identified. MUSCULOSKELETAL: Degenerative changes in the spine and hips.     CONCLUSION: 1.  Large amount of hemoperitoneum. The bleeding source is not identified. 2.  Abnormal liver suggesting diffuse parenchymal disease, and potentially steatosis, fibrosis, and/or mild cirrhosis. There are also focal hypodense lesions in liver which are indeterminate. MRI could assess further. 3.  There are potentially some mild varices in the upper abdomen-retroperitoneum. However the spleen is not enlarged and the portal vein is not abnormally distended. 4.  Soft tissue stranding in the omental fat probably inflammatory, neoplastic infiltration less likely but not excluded. 5.  No evidence for aortic dissection or aneurysm. 6.  5 mm nodule right lower lobe. See below for follow-up guidelines. Results were telephoned to the patient's physician AUGIE WINCHESTER at the time of this dictation. REFERENCE: Recommendations for pulmonary nodule follow-up according to Fleischner Society Guidelines, Radiology 2005; 237:395-400. Nodule size less than or equal to 4 mm Low-risk patients: No follow-up needed. High-risk patients: Follow-up at 12 months and if no change, no further imaging needed. Nodule size > 4-6 mm Low-risk patients: Follow-up at 12 months and if no change, no further imaging needed. High-risk patients: Initial follow-up CT at 6-12 months and then at 18-24 months if no change. Nodule size > 6-8 mm Low-risk patients: Initial follow-up CT at 6-12 months and then at 18-24 months if no change. High-risk patients: Initial follow-up CT at 3-6 months and then at 9-12 and 24 months if no change. Nodule size > 8 mm Either low or high-risk patients: Follow-up CTs at  around 3, 9, and 24 months. Dynamic contrast-enhanced CT, PET, and/or biopsy. Size is average of length and width. Subsolid (ground-glass) or partial solid nodules require longer follow-up. Consider referral to the [facility].    Surgery: Exploratory laparotomy and oversewing of mesenteric varix on 1/11 by Dr Robert Hodge    Vital Signs in last 24 hours:    Temp:  [98.4  F (36.9  C)-98.7  F (37.1  C)] 98.4  F (36.9  C)  Heart Rate:  [] 102  Resp:  [18-20] 18  BP: (118)/(56-57) 118/56    Physical Exam:    Pertinent exam findings on day of discharge include:  General: Well-appearing male not in acute distress, alert and oriented x3  HEENT: Head normocephalic atraumatic, pupils equal, round, and reactive to light, oral mucosa moist  CV: Regular rate and rhythm, no murmurs, rubs, or gallops  Resp: Lungs clear to auscultation bilaterally, no wheezes, rales, or rhonchi  GI: Belly soft, mildly distended, 2x ZACHARY drains in place with serous fluid  Skin: Laparotomy incision CDI with staples and steris  Extremities: No peripheral edema  Psych: Normal affect, mood euthymic  Neuro: Somewhat slowed mentation. Moving all 4 extremities    Discharge Medications:   Current Discharge Medication List      START taking these medications    Details   !! gabapentin (NEURONTIN) 250 mg/5 mL solution Take 6 mL (300 mg total) by mouth every 8 (eight) hours.  Qty: 470 mL, Refills: 12    Associated Diagnoses: Alcohol withdrawal, uncomplicated; Encephalopathy; Compulsive behaviors      !! gabapentin (NEURONTIN) 250 mg/5 mL solution Take 10 mL (500 mg total) by mouth 2 (two) times a day as needed (agitation).  Qty: 470 mL, Refills: 12    Associated Diagnoses: Alcohol withdrawal, uncomplicated; Encephalopathy; Compulsive behaviors      HYDROmorphone (DILAUDID) 1 mg/mL Liqd solution Take 0.5-1 mL (0.5-1 mg total) by mouth every 15 (fifteen) minutes as needed (severe pain or dyspnea).  Qty: 210 mL, Refills: 0    Associated Diagnoses:  Alcoholic hepatitis without ascites; Lumbago      LORazepam (ATIVAN) 2 mg/mL concentrated solution Take 0.5 mL (1 mg total) by mouth every 6 (six) hours as needed (agitation).  Qty: 30 mL, Refills: 0    Associated Diagnoses: Alcohol withdrawal, uncomplicated; Encephalopathy; Compulsive behaviors      menthol-zinc oxide (CALMOSEPTINE) 0.44-20.6 % Oint ointment Apply 1 application topically 4 (four) times a day as needed (apply to affected area for perineal irritation).  Refills: 0    Associated Diagnoses: Perineal irritation      nystatin (MYCOSTATIN) 100,000 unit/mL suspension Take 5 mL (500,000 Units total) by mouth 4 (four) times a day for 5 days. Swish and swallow  Qty: 60 mL, Refills: 0    Associated Diagnoses: Oral candidiasis      OLANZapine (ZYPREXA) 5 MG tablet Take 1 tablet (5 mg total) by mouth bedtime.  Qty: 30 tablet, Refills: 0    Associated Diagnoses: Alcohol withdrawal, uncomplicated; Encephalopathy; Compulsive behaviors       !! - Potential duplicate medications found. Please discuss with provider.      CONTINUE these medications which have NOT CHANGED    Details   traZODone (DESYREL) 150 MG tablet Take 150 mg by mouth bedtime as needed for sleep.         STOP taking these medications       amLODIPine (NORVASC) 10 MG tablet Comments:   Reason for Stopping:         aspirin 81 MG EC tablet Comments:   Reason for Stopping:         diclofenac (VOLTAREN) 75 MG EC tablet Comments:   Reason for Stopping:         diphenhydrAMINE-acetaminophen (TYLENOL PM EXTRA STRENGTH)  mg Tab Comments:   Reason for Stopping:         glucosamine sulfate 500 mg cap Comments:   Reason for Stopping:         magnesium oxide 250 mg Tab Comments:   Reason for Stopping:         omeprazole (PRILOSEC) 10 MG capsule Comments:   Reason for Stopping:         zolpidem (AMBIEN) 5 MG tablet Comments:   Reason for Stopping:         sildenafil (VIAGRA) 100 MG tablet Comments:   Reason for Stopping:               Discharge  Instructions:  Follow up with Primary Care Physician: Shaheen Smiley MD PRN  Diet: regular diet  Activity: as tolerated  PT/OT/ST: none  Restrictions: none  Wound / drain care: remove staples in 3-4 days IF incision healing well. Leave ZACHARY drains in place and empty them PRN  The following tests have been done with results pending: none     Soheila Mills MD  [facility] Medicine Service    Total time spent on this discharge >30 minutes

## 2021-06-08 NOTE — ANESTHESIA CARE TRANSFER NOTE
Last vitals:   Vitals:    01/11/17 1310   BP: 132/49   Pulse: 85   Resp: 14   Temp: (!) 35.6  C (96.1  F)   SpO2: 94%     Patient's level of consciousness is sedated   Spontaneous respirations: no  Maintains airway independently: no   Dentition unchanged: yes  Oropharynx: ETT [place] 8.0 secured in placed with silk  Tape, ambu used to ventilate for transport, O2 sats 100% throughout arrival to ICU.  RT present to resume ventilation on ventilator.        QCDR Measures:  ASA# 20 - Surgical Safety Checklist: ASA20A - Safety Checks Done  PQRS# 430 - Adult PONV Prevention: 4558F - Pt received => 2 anti-emetic agents (different classes) preop & intraop  ASA# 8 - Peds PONV Prevention: NA - Not pediatric patient, not GA or 2 or more risk factors NOT present  PQRS# 424 - Stefania-op Temp Management: 4559F - At least one body temp DOCUMENTED => 35.5C or 95.9F within required timeframe  PQRS# 426 - PACU Transfer Protocol: - Transfer of care checklist used  ASA# 14 - Acute Post-op Pain: ASA14B - Patient did NOT experience pain >= 7 out of 10    I completed my SBAR handoff to the receiving nurse per policy and procedure.

## 2021-06-08 NOTE — PROGRESS NOTES
"Progress Note    Assessment/Plan   3 Days Post-Op    EXPLORATORY LAPAROTOMY, EVACUATION OF HEMAPERITONEUM and ASCITES, OVERSEWING MESENTERIC VARIX  Principal Problem:    Shock circulatory  Active Problems:    Hemorrhage, peritoneal    Acute blood loss anemia    Lactic acidosis    Alcohol withdrawal, uncomplicated    Respiratory failure, post-operative      Subjective   Vented and continues to need pressors. Trial of weaning but became very agitated. NG minimal output 50 over 24 hours. Large amount of ascites drainage mostly out of right lower drain but also out of left drain. Hgb stable. Questions today on tpn start vs drips of tube feeding through ng. Also clamping tube and checking occasionally.   Objective    Vital signs in last 24 hours  Temp:  [98  F (36.7  C)-98.8  F (37.1  C)] 98  F (36.7  C)  Heart Rate:  [53-89] 57  Resp:  [12-18] 16  Arterial Line BP: ()/(38-55) 116/53  FiO2 (%):  [30 %-40 %] 30 %  Weight:   (!) 256 lb 6.3 oz (116.3 kg)    Intake/Output last 3 shifts  I/O last 3 completed shifts:  In: 3310.6 [I.V.:2660.6; IV Piggyback:650]  Out: 4025 [Urine:1390; Emesis/NG output:50; Drains:2585]  Intake/Output this shift:  I/O this shift:  In: 760 [I.V.:60; IV Piggyback:700]  Out: 725 [Urine:250; Drains:475]    Review of Systems   Pertinent items are noted in HPI.    Physical Exam  Visit Vitals     /47 (Patient Position: Lying)     Pulse (!) 57     Temp 98  F (36.7  C) (Axillary)     Resp 16     Ht 6' 3\" (1.905 m)     Wt (!) 256 lb 6.3 oz (116.3 kg)     SpO2 96%     BMI 32.05 kg/m2     General appearance: sedated  Abdomen: soft. hypoactive bs. drains with acites fluid. no blood    Pertinent Labs   Lab Results: personally reviewed.   Lab Results   Component Value Date     01/14/2017    K 3.7 01/14/2017     (H) 01/14/2017    CO2 21 (L) 01/14/2017    BUN 20 01/14/2017    CREATININE 0.68 (L) 01/14/2017    CALCIUM 7.3 (L) 01/14/2017     Lab Results   Component Value Date    WBC 11.3 (H) " 01/14/2017    WBC 15.4 (H) 01/13/2017    WBC 21.2 (H) 01/12/2017    WBC 6.1 06/16/2015    HGB 7.9 (L) 01/14/2017    HGB 7.9 (L) 01/13/2017    HGB 7.9 (L) 01/13/2017    HCT 23.0 (L) 01/14/2017    HCT 23.1 (L) 01/13/2017    HCT 25.4 (L) 01/12/2017    MCV 90 01/14/2017    MCV 89 01/13/2017    MCV 88 01/12/2017     (L) 01/14/2017     01/13/2017     01/12/2017

## 2021-06-08 NOTE — PROCEDURES
The patient was in the operating room during this time of procedure.  The right groin was prepped and draped in a sterile fashion and a dual-lumen Cordis type central line was placed using a Seldinger technique and the appropriate kit.  A seeker needle was obtained to gain access into the vein and a guidewire was placed through the seeker needle.  A dilating catheter and the Cordis type central line was placed over the guidewire without difficulty.  The guidewire was removed and the Cordis was secured into the femoral vein with adequate return of blood flow.  I then sutured the central line to the skin and flushed with heparinized saline.  Patient tolerated the procedure well or conditions.

## 2021-06-08 NOTE — PROGRESS NOTES
Hospice met with family, two sisters and they would like to proceed with transfer to [facility]. HE/stretcher has been arranged for 1:30pm today. PCS form given to [facility] and nurse notified.

## 2021-06-08 NOTE — PROGRESS NOTES
[place] Admissions: Following along with patient for LTACH needs at discharge. Patient is appropriate at this time for . Would need to be off Precedex for 24 hours and stable prior to transfer. Will tentatively plan for transfer possibly Friday pending progress and insurance authorization. Thank you.    Dominga Duke, SAM Referral Specialist [phone]

## 2021-06-08 NOTE — PROGRESS NOTES
[place] Admissions: patient populated LTACH trigger tool for possible LTACH needs at discharge. Currently requiring ICU care. Will follow along at this time.    Dominga Duke RN Referral Specialist [phone]

## 2021-06-08 NOTE — PROGRESS NOTES
Pt came to ICU intubated from surgery.  #8 ETT sublgottic placed at 23 cm at teeth, cuff pressure 30 cm h20, pt needing CXR for placement.  Pt has bilateral clear breath sounds.  Per Dr Watson, vent settings are  (6.5 ml/kg), rate 16, peep 5 and titrated to 50% fi02.  ABGs drawn per a-line PH 7.30, PC02 37, P02 73, HC03 19.1, sat 98.8%.  PIP 33, PIP alarm 45, plateau 20.  Family here.

## 2021-06-08 NOTE — PROGRESS NOTES
"RESPIRATORY CARE NOTE     Patient Name: Babak Wolff  Today's Date: 1/13/2017     Pt continues on the following settings:  Vent Mode: VCV  FiO2 (%):  [40 %] 40 %  S RR:  [14] 14  S VT:  [600 mL] 600 mL  PEEP/CPAP (cm H2O):  [5 cm H2O] 5 cm H2O  Minute Ventilation (L/min):  [8.1 L/min-11.9 L/min] 8.1 L/min  PIP:  [16 cm H2O-33 cm H2O] 30 cm H2O  MAP (cm H2O):  [7-9] 9      Pt is intubated with  # 8.0 ETT secured  25 at the teeth. Pt weaned did not wean this shift. BS are clear. Rn has suction scant amounts of secretions.  Pt's respiratory status is stable. RT will continue to follow per MD's orders.     Visit Vitals     /47 (Patient Position: Lying)     Pulse 62     Temp 98.7  F (37.1  C) (Oral)     Resp 14     Ht 6' 3\" (1.905 m)     Wt (!) 257 lb 8 oz (116.8 kg)     SpO2 97%     BMI 32.18 kg/m2         Corrine M Trierweiler, LRT  "

## 2021-06-08 NOTE — PROGRESS NOTES
Patient seen for follow up on General Inpatient Status  For agitation and acute exsanguination. Patient had remained agitated well into the evening, per staff reports. Once routine Thorazine was obtained and initiated, patient became more calm and slept. This morning, his respiratory rate has been 24, and he is working hard to breathe, with signs of accessory muscle use. He is minimally responsive to staff or family. He has not had any further bleeding. Urinary output minimal. Output from peritoneal drains also decreased to minimal.    Physical exam: RR 28, pulse 110, /50, temp. 97.8  Patient in bed, supine. Loud, snoring respirations. Face appears peaceful. Respirations labored with abdominal muscle use and paroxysmal chest wall motion. Lungs coarse throughout with poor air movement. Abdomen soft, but bowel sounds absent. Extremities warm and pedal pulses strong, but tachycardic. No mottling present, knees warm.    Assessment: End stage alcoholic cirrhosis with recent GI hemmorrhage and suspected aspiration. Patient is no longer agitated, so he no longer requires GIP level of care. It appears that he is actively dying. I provided education to family and significant other re very short, but uncertain length of prognosis. Encouraged them to speak to the patient to comfort him. Offered  visit for emotional support. Family also requested oxygen be started, now that he will likely leave the nasal canula in place, to help with respiratory distress. Will also order more concentrated hydromorphone for control of respiratory symptoms.

## 2021-06-08 NOTE — PROGRESS NOTES
"  Clinical Nutrition Therapy Reassessment Note      Reason for Assessment:   Babak Wolff is a 61 y.o. male assessed by the registered Dietitian for Bertin score, nutrition risk screen and nutrition support.  none  Nutrition History:  Information obtained from family/caregiver and chart.  Pt is confused  Patients diet prior to admission has been reg. Recent food/fluid intake has been TPN here in hospital. Patient has been consuming the following supplements none. Patient has the following cultural/Moravian food needs or preferences: none  Patient has the following food allergies or intolerances:NKFA    Current Nutrition Prescription:   Diet: NPO  TPN  D20 8 AA @ 75cc/hr  TF ordered 1/21  Peptemen AF @ 10cc/hr, no advancement ordered  Nothing documented as to amt received of if residuals, unclear if running  Supplements and Modulars:   Flush Orders:   Propofol Orders:  IV dextrose or Fluids:  dexmedetomidine 400 mcg/100 mL in NS (PRECEDEX) (4mcg/mL) Last Rate: Stopped (01/19/17 2330)   dextrose 10%    sodium chloride 0.45% Last Rate: 25 mL/hr (01/23/17 0818)   TPN - Custom formula Last Rate: 75 mL/hr at 01/22/17 2340       Current Nutrition Intake:    TPN provides 2085 kcals, 144 gms protein, 360 gms CHO, and 1800cc fluid    Does not appear TF is running   Pt received 60cc of TF  Has G tube placed in OR    Family making decisions regarding care, await plans    Anthropometrics:  Height: 6' 3\" (190.5 cm)  Admission weight:  Weight: 218 lb 14.7 oz (99.3 kg)  BMI (Calculated): 32  BMI indication: obese  Adj wt 89 kg:    Physical Findings:  The patient has the following physical signs which could indicate malnutrition: edema and reduced  strength       GI Status/Output:   The patient's GI symptoms include: diarrhea    Bowel Sounds present    Skin/Wound:  Bertin score Bertin Scale Score: 13    Medications:  Medications reviewed.    Labs:  Labs reviewed    Nutrition Risk Level: moderate risk    Nutrition " dx:Inadequate oral po intake r/t peritoneal bleeding eviden  charley by TPN    Goal:nurition to meet needs via most appropriate route, with eventual po intake    Intervention:  TPN  With hopfully transition to TF or po    Monitoring:tpn tolerance, TF start and tolerance, plan of care    See Care Plan for Problems, Goals, and Interventions.

## 2021-06-08 NOTE — PROGRESS NOTES
Chart check discussed with Dr. Shannon Patient still intubated and on requiring pressors. Will revisit in am and follow peripherally.

## 2021-06-08 NOTE — PROGRESS NOTES
Clinical Nutrition Therapy Reassessment Note Parenteral    Reason for Assessment:  nutrition support.    60 yo male with septic shock, s/p peritoneal bleeding, ETOH cirrrhosis, w/p extubation, w/D    Current Nutrition Prescription:   Diet: NPO  TPN of D29 7 AA @ 75cc/hr with 20% IL 4 times per week  Supplements and Modulars:    Flush Orders:   Propofol Orders:  IV dextrose or Fluids:   nacl 0.9%       Current Nutrition Intake:      Parenteral nutrition access is a centeral line. The current nutritonal order will provide, 2014 kcals, 126 grams protein, 360 grams carbohydrate, 29 grams fat daily average, and 1800 mls fluid daily.         Pt with large out put from ZACHARY drains and protein losses, also giving him albumin with 1 to 2 plus edema  Will increase AA to as high as we can go.  D/w pharm.  Will try for 7,5 to 8 % AA to provide and additional 9 to 18 gms protein per day      Total nutrient intake from all sources meets estimated nutritional needs.    Anthropometrics:  Most recent weight: 115.6 kg     Admit 110.7 kg      GI Status/Output:   The patient's GI symptoms include: constipation, no BM for 8 days  Brown smear overnight    Bowel Sounds Absent and hypoactive    Skin/Wound:  Bertin score  13    Medications:  Medications reviewed.    Labs:  Labs reviewed       Nutrition Risk Level: stable-high risk    Nutrition Dx:Inadequate oral po intake r/t gut malfunction evidenced by TPN    Goal: transition to use of gut when bowel sounds present and ok with MD    Intervention:Cont with TPN @ 75cc/hr with pharm to increase AA to 7.5 or 8 % and lipids 4 times per week    Monitoring:plan of care, TPN tolerance and ability to use gut for nutrition      See Care Plan for further Problems, Goals, and Interventions.

## 2021-06-08 NOTE — PROGRESS NOTES
SW spoke with Sarah Cox regarding speaking with patient and offering resources.  Sarah reports pt is still in acute delirium and will not be able to participate in an assessment or take resources for at least a couple of more days.  SW/CM will continue to follow and support with needs when pt is ready.    Bronwyn Godinez, TIERRA  1/16/2017  10:28 AM

## 2021-06-08 NOTE — ANESTHESIA POSTPROCEDURE EVALUATION
Patient: Babak Wolff  EXPLORATORY LAPAROTOMY, EVACUATION OF HEMAPERITONEUM and ASCITES, OVERSEWING MESENTERIC VARIX  Anesthesia type: general    Patient location: ICU  Last vitals:   Vitals:    01/11/17 1515   BP:    Pulse: 64   Resp: 18   Temp:    SpO2: 96%     Post vital signs: stable  Level of consciousness: Sedated  Post-anesthesia pain: pain controlled  Post-anesthesia nausea and vomiting: no  Pulmonary: The patient is intubated and on mechanical ventilation  Cardiovascular: Requiring pressors for BP management - Levophed 18 mcg/min, Phenylephrine 50 mcg/min, Vasopressin 2.4 u/hr  Hydration: Currently receiving PRBC infusion.  Anesthetic events: no    QCDR Measures:  ASA# 11 - Stefania-op Cardiac Arrest: ASA11B - Patient did NOT experience unanticipated cardiac arrest  ASA# 12 - Stefania-op Mortality Rate: ASA12B - Patient did NOT die  ASA# 13 - PACU Re-Intubation Rate: ASA13B - Patient did NOT require a new airway mgmt  ASA# 10 - Composite Anes Safety: ASA10A - No serious adverse event  ASA# 38 - New Corneal Injury: ASA38A - No new exposure keratitis or corneal abrasion in PACU    Additional Notes: Patient was transported to the ICU in stable condition. Full report given to the intensivist.    Tamir Olguin MD

## 2021-06-08 NOTE — CONSULTS
S: Hospice  B: Met with Pt's girlfriend Ashely, siblings Barrie and Lynn and Barrie's girlfriend. At first they were frustrated with what they felt were miscommunication between Hospital staff and them. They had questions about being told different info about discharge, physical therapy and his current condition etc. His sister Lynn asked if they could talk to the doctor.   Priya HERNANDEZ from ICU came to join us. This helped clear up some questions and we then spoke about goals of care. After an emotional discussion they feel they want to proceed with switching to comfort care focus and understand he will stop TPN and most likely move out of ICU. They wanted to talk privately about the exact date/ time they want comfort cares to start. They will talk to Priya HERNANDEZ about that. They want him on comfort cares for a couple days to see if he will be stable for discharge after that. If he is, they want to know about bed availability at [facility] or [facility]. Sounds like they are leaning towards placement instead of going home with Ashely.  A: Did not participate in this conversation.  R: Will visit them again this afternoon or tomorrow. We will want to see how he does on comfort cares and talk again about discharge options. They know Gonzalez would not want his life prolonged and they want to honor his wishes.

## 2021-06-08 NOTE — PROGRESS NOTES
Met with significant other; she declines need for resources at discharge as patient is independent. Will follow up with patient when he is available.     Follow up: spoke with patient and he suggested that SW might be a good resource, but he would like to see how the next 24 hours goes as far as withdrawal.

## 2021-06-08 NOTE — PROGRESS NOTES
"Pharmacy Note: Total Parenteral Nutrition (TPN) Management    Pharmacist consulted to dose TPN for Babak Wolff, a 61 y.o. male by Dr. Shannon.    Subjective:    The patient is a new TPN start.    The patient was started on TPN in the hospital on 1/14/17.    Indication for TPN therapy: GI tract is not functional: Hemoperitoneum and Sepsis    Inadequate nutrition existing for > 7 days.     Enteral nutrition contraindicated due to: Hemoperitoneum.    Pertinent diseases and other special considerations hepatic failure    Social History   Substance Use Topics     Smoking status: Never Smoker     Smokeless tobacco: Former User     Types: Chew     Alcohol use Yes      Comment: Heavy alcohol use.  Brother and sister note longstanding consumption of vodka in large quantities, intoxication by morning-midday, multiple empty 1.75-L vodka bottles in home.     Objective:    Height: 6' 3\" (1.905 m)    Weight: 99.3 kg (218 lb 14.7 oz)    Body mass index is 27.36 kg/(m^2).    Patient Vitals for the past 96 hrs:   Weight   01/23/17 0000 99.3 kg (218 lb 14.7 oz)   01/22/17 0400 (!) 101.7 kg (224 lb 3.3 oz)   01/21/17 0100 (!) 102.5 kg (225 lb 15.5 oz)   01/20/17 0000 (!) 103.1 kg (227 lb 4.7 oz)       Labs:  Last 3 days:  Recent Labs      01/20/17   1544   01/21/17   0619  01/22/17   0629  01/23/17   0459   NA   --    --   143  143  146*  146*   K  3.1*   --   3.6  3.6  3.7  3.5   CL   --    --   112*  112*  113*  112*   CO2   --    --   25  25  25  26   BUN   --    --   20  20  25*  30*   CREATININE   --    --   0.90  0.90  0.95  0.93   GLU   --    --   205*  205*  199*  200*   CALCIUM   --    --   8.1*  8.1*  8.1*  8.3*   MG   --    --   2.0  2.0  2.1  2.2   PHOS   --    --   2.5  2.5  2.8  3.0   PROT   --    --   5.4*  5.9*  5.9*   ALBUMIN   --    --   2.2*  2.4*  2.3*   HGB   --    --   8.5*  8.5*  8.6*   HCT   --    --   25.8*  26.3*  26.2*   PLT   --    --   162  165   --    BILITOT   --    --   5.3*  5.6*  5.5* "   AST   --    --   165*  143*  142*   ALT   --    --   62*  59*  61*   ALKPHOS   --    < >  74  66  67    < > = values in this interval not displayed.       Fingerstick Blood Glucose (past 48 hours):   Recent Labs      01/21/17   1139  01/21/17   1729  01/21/17   2050  01/21/17   2334  01/22/17   0545  01/22/17   1123  01/22/17   1758  01/22/17   2336  01/23/17   0500   POCGLUFGR  211  219  189  183  188  245  262  208  206       Intake/Output (last 24 hours): I/O last 3 completed shifts:  In: 4142 [P.O.:240; I.V.:639; Other:89; NG/GT:310]  Out: 2875 [Urine:1350; Emesis/NG output:600; Drains:925]    Estimated CrCl: Estimated Creatinine Clearance: 120.1 mL/min (by C-G formula based on Cr of 0.93).    Assessment:    Initiate patient on TPN therapy as a continuous central therapy.     Given the patient's current condition/oral intake, PN is still indicated.    Lab results reviewed:  Na+, K+, Ca++, and Phos all within normal range. Patient remains acidotic, so will continue to maximize acetate in the TPN.    Blood glucose levels have been in the 200s consistently, lantus increased within last 48 hours - will continue to monitor . Patient does have orders for SS Novolog and Lantus BID, will continue to manage BS with those - no insulin in TPN at this time    Plan:  1. Rate of TPN: 75 mL/hr. Maintenance IV fluid rate will be adjusted appropriately to meet the total maximum IV fluids rate as ordered by provider.  2. Formula:     Amino Acids 8 %    Dextrose 20 %    Sodium 40 mEq/L    Potassium 45 mEq/L    Magnesium 5 mEq/L    Calcium 6 mEq/L    Phosphorus 18 mMol/L    Chloride: Acetate Ratio = Max Acetate    Standard Multivitamins    Trace Elements    Famotidine 40 mg/day  3. Fat Emulsion: 250 ml, 4 times weekly on Sun, Tue, Thu, and Sat. Propofol infusion has been discontinued.  4. Check full TPN panel tomorrow as ordered.  5. Pharmacist will continue to follow the patient's lab results, clinical status and blood glucose  results and make adjustments as appropriate.    Thank you for the consult.  Kathleen London, PharmD 1/23/2017 11:25 AM

## 2021-06-08 NOTE — H&P
HISTORY AND PHYSICAL    DOS: 1/9/2017     Chief Complaint:    Syncope    HPI:    61 y.o. male with hx of chronic abdominal pain and insomnia who presents to the Emergency Department via EMS with his wife for evaluation of abdominal pain and syncope. Pt reports he had the onset of suprapubic abdominal pain 3 months ago. He has been following up with his PCP and had u/s and xray of the abdomen with no significant findings. Pt notes he has restless sleep most nights. Last night pt had an episode of watery diarrhea. He states he went to bed feeling fine. This morning pt reports he woke up with the onset of tachypnea and dizziness. When he stood up pt had an episode of syncope. His wife called EMS. Upon arrival, pt rates his abdominal pain as 6/10. He notes the tachypnea has resolved; however, he continues to feel dizzy. Pt denies nausea.   Upon ER workup, patient was found to have fluid inside the .  CTA was done and found to have hemoperitoneum.  Patient received 2 unit of PRBC from emergency room.  Patient hemodynamically improved.  Discussed with surgeon Julia STALLINGS to look for the source of bleeding.  And patient will be admitted to ICU for further investigation and management.    Review of Systems:    Denies any fever, chills, focal weakness or numbness.  The rest of the twelve point review of systems is negative    No Known Allergies     Past Medical History   Diagnosis Date     Chronic abdominal pain      Insomnia         History reviewed. No pertinent past surgical history.     No family history on file.     Social History     Social History     Marital status:      Spouse name: N/A     Number of children: N/A     Years of education: N/A     Occupational History     Not on file.     Social History Main Topics     Smoking status: Never Smoker     Smokeless tobacco: Former User     Types: Chew     Alcohol use 3.0 - 3.5 oz/week     6 - 7 drink(s) per week     Drug use: Yes       Comment: occasional marijuana     Sexual activity: Yes     Partners: Female     Other Topics Concern     Not on file     Social History Narrative            Current Facility-Administered Medications:      naloxone injection 0.04-0.1 mg (NARCAN), 0.04-0.1 mg, Intravenous, PRN **OR** naloxone injection 0.1-0.4 mg (NARCAN), 0.1-0.4 mg, Intramuscular, PRN, Spencer Mayorga MD     Insert peripheral IV, , , Once **AND** Saline lock IV, , , Once **AND** sodium chloride 0.9 % flush 3 mL (NS), 3 mL, Intravenous, PRN, Spencer Mayorga MD     sodium chloride 0.9% 0-1,000 mL, 0-1,000 mL, Intravenous, PRN, Spencer Mayorga MD, 1,000 mL at 01/09/17 1002    Current Outpatient Prescriptions:      amLODIPine (NORVASC) 10 MG tablet, Take 10 mg by mouth daily., Disp: , Rfl:      aspirin 81 MG EC tablet, Take 81 mg by mouth daily., Disp: , Rfl:      diclofenac (VOLTAREN) 75 MG EC tablet, Take 75 mg by mouth 2 (two) times a day as needed., Disp: , Rfl:      diphenhydrAMINE-acetaminophen (TYLENOL PM EXTRA STRENGTH)  mg Tab, Take 1-2 tablets by mouth bedtime as needed. , Disp: , Rfl:      glucosamine sulfate 500 mg cap, Take 500 mg by mouth daily., Disp: , Rfl:      magnesium oxide 250 mg Tab, Take 250 mg by mouth daily., Disp: , Rfl:      omeprazole (PRILOSEC) 10 MG capsule, Take 10 mg by mouth Daily before breakfast., Disp: , Rfl:      traZODone (DESYREL) 150 MG tablet, Take 150 mg by mouth bedtime as needed for sleep., Disp: , Rfl:      zolpidem (AMBIEN) 5 MG tablet, Take 5 mg by mouth bedtime as needed for sleep., Disp: , Rfl:      sildenafil (VIAGRA) 100 MG tablet, Take 1 tablet (100 mg total) by mouth daily as needed., Disp: 12 tablet, Rfl: 5     Scheduled Meds:   Continuous Infusions:   PRN Meds:.naloxone **OR** naloxone, Insert peripheral IV **AND** Saline lock IV **AND** sodium chloride, sodium chloride 0.9%    Physical Examination:    Temp:  [97.5  F (36.4  C)-97.9  F (36.6  C)] 97.5  F (36.4  C)  Heart Rate:   [104-120] 104  Resp:  [18-28] 20  BP: ()/(42-60) 109/56  SpO2:  [93 %-97 %] 94 %     GEN: Awake, Not in distress.  HEENT:   HEAD: Normocephalic, atraumatic  EYE: Pupils round reactive light, extraocular movement normal.  EARS: No impaired hearing.  NOSE: No discharge.  THROAT: Mucous membrane moist, no exudates.   NECK: Supple, no JVD.  CVS: S1, S2  LUNGS: CTA BL.  ABD: BS (+), Soft, distended  EXT: No edema  NEURO: Awake, move all extremities grossly.  PSYCHIATRY: No anxiety, depression.  MUSCULOSKELETAL: No joint pain or effusion.      Labs:       Results from last 7 days  Lab Units 01/09/17  0805   LN-WHITE BLOOD CELL COUNT thou/uL 12.3*   LN-HEMOGLOBIN g/dL 7.1*   LN-HEMATOCRIT % 22.0*   LN-PLATELET COUNT thou/uL 170   LN-NEUTROPHILS RELATIVE PERCENT % 72*   LN-MONOCYTES RELATIVE PERCENT % 4         Results from last 7 days  Lab Units 01/09/17  0805   LN-SODIUM mmol/L 135*   LN-POTASSIUM mmol/L 3.9   LN-CHLORIDE mmol/L 103   LN-CO2 mmol/L 13*   LN-BLOOD UREA NITROGEN mg/dL 13   LN-CREATININE mg/dL 1.49*   LN-CALCIUM mg/dL 8.2*         Results from last 7 days  Lab Units 01/09/17  0805   LN-INR  1.68*         Results from last 7 days  Lab Units 01/09/17  0805   LN-SODIUM mmol/L 135*   LN-POTASSIUM mmol/L 3.9   LN-CHLORIDE mmol/L 103   LN-CO2 mmol/L 13*   LN-BLOOD UREA NITROGEN mg/dL 13   LN-CREATININE mg/dL 1.49*   LN-CALCIUM mg/dL 8.2*   LN-ALBUMIN g/dL 2.4*   LN-PROTEIN TOTAL g/dL 5.6*   LN-BILIRUBIN TOTAL mg/dL 2.9*   LN-ALKALINE PHOSPHATASE U/L 74   LN-ALT (SGPT) U/L 22   LN-AST (SGOT) U/L 90*       Recent Results (from the past 24 hour(s))   Comprehensive metabolic panel    Collection Time: 01/09/17  8:05 AM   Result Value Ref Range    Sodium 135 (L) 136 - 145 mmol/L    Potassium 3.9 3.5 - 5.0 mmol/L    Chloride 103 98 - 107 mmol/L    CO2 13 (L) 22 - 31 mmol/L    Anion Gap, Calculation 19 (H) 5 - 18 mmol/L    Glucose 303 (H) 70 - 125 mg/dL    BUN 13 8 - 22 mg/dL    Creatinine 1.49 (H) 0.70 - 1.30 mg/dL     GFR MDRD Af Amer 59 (L) >60 mL/min/1.73m2    GFR MDRD Non Af Amer 48 (L) >60 mL/min/1.73m2    Bilirubin, Total 2.9 (H) 0.0 - 1.0 mg/dL    Calcium 8.2 (L) 8.5 - 10.5 mg/dL    Protein, Total 5.6 (L) 6.0 - 8.0 g/dL    Albumin 2.4 (L) 3.5 - 5.0 g/dL    Alkaline Phosphatase 74 45 - 120 U/L    AST 90 (H) 0 - 40 U/L    ALT 22 0 - 45 U/L   Lipase    Collection Time: 01/09/17  8:05 AM   Result Value Ref Range    Lipase 43 0 - 52 U/L   Lactic Acid    Collection Time: 01/09/17  8:05 AM   Result Value Ref Range    Lactic Acid 13.2 (HH) 0.5 - 2.2 mmol/L   HM1 (CBC with Diff)    Collection Time: 01/09/17  8:05 AM   Result Value Ref Range    WBC 12.3 (H) 4.0 - 11.0 thou/uL    RBC 2.43 (L) 4.40 - 6.20 mill/uL    Hemoglobin 7.1 (L) 14.0 - 18.0 g/dL    Hematocrit 22.0 (L) 40.0 - 54.0 %    MCV 91 80 - 100 fL    MCH 29.4 27.0 - 34.0 pg    MCHC 32.4 32.0 - 36.0 g/dL    RDW 17.9 (H) 11.0 - 14.5 %    Platelets 170 140 - 440 thou/uL    MPV 8.9 7.0 - 10.0 fL    Neutrophils % 72 (H) 50 - 70 %    Lymphocytes % 22 20 - 40 %    Monocytes % 4 2 - 10 %    Eosinophils % 1 0 - 6 %    Basophils % 1 0 - 2 %    Neutrophils Absolute 8.9 (H) 2.0 - 7.7 thou/uL    Lymphocytes Absolute 2.7 0.8 - 4.4 thou/uL    Monocytes Absolute 0.5 0.0 - 0.9 thou/uL    Eosinophils Absolute 0.1 0.0 - 0.4 thou/uL    Basophils Absolute 0.1 0.0 - 0.2 thou/uL   INR    Collection Time: 01/09/17  8:05 AM   Result Value Ref Range    INR 1.68 (H) 0.90 - 1.10   APTT    Collection Time: 01/09/17  8:05 AM   Result Value Ref Range    PTT 29 24 - 37 seconds   Type and Screen    Collection Time: 01/09/17  8:08 AM   Result Value Ref Range    ABORh A POS     Antibody Screen Negative Negative   Crossmatch    Collection Time: 01/09/17  9:28 AM   Result Value Ref Range    Crossmatch Compatible     Blood Expiration Date [id number]     Unit Type A Pos     Unit Number [id number]     Status Ready     Component Red Blood Cells     PRODUCT CODE F6598J05     Blood Type 6200     CODING  SYSTEM [id number]    Crossmatch    Collection Time: 01/09/17  9:28 AM   Result Value Ref Range    Crossmatch Compatible     Blood Expiration Date [id number]     Unit Type A Pos     Unit Number [id number]     Status Ready     Component Red Blood Cells     PRODUCT CODE G1197K77     Blood Type 6200     CODING SYSTEM [id number]    Crossmatch    Collection Time: 01/09/17  9:28 AM   Result Value Ref Range    Crossmatch Compatible     Blood Expiration Date [id number]     Unit Type A Pos     Unit Number [id number]     Status Ready     Component Red Blood Cells     PRODUCT CODE M1135Y06     Blood Type 6200     CODING SYSTEM [id number]    Crossmatch    Collection Time: 01/09/17  9:45 AM   Result Value Ref Range    Crossmatch Compatible     Blood Expiration Date [id number]     Unit Type A Pos     Unit Number [id number]     Status Issued     Component Red Blood Cells     PRODUCT CODE H6453X00     Issue Date and Time [id number]     Blood Type 6200     CODING SYSTEM [id number]            I have reviewed all the lab results.     Radiology:   CTA chest, abdomen, pelvis    IMPRESSION:   CONCLUSION:  1. Large amount of hemoperitoneum. The bleeding source is not identified.  2. Abnormal liver suggesting diffuse parenchymal disease, and potentially steatosis, fibrosis, and/or mild cirrhosis. There are also focal hypodense lesions in liver which are indeterminate. MRI could assess further.  3. There are potentially some mild varices in the upper abdomen-retroperitoneum. However the spleen is not enlarged and the portal vein is not abnormally distended.  4. Soft tissue stranding in the omental fat probably inflammatory, neoplastic infiltration less likely but not excluded.  5. No evidence for aortic dissection or aneurysm.  6. 5 mm nodule right lower lobe. See below for follow-up guidelines.    Assessment and Plan:    Syncope: Likely secondary to hemorrhagic shock.  Treat as below.    Hemorrhagic shock/hypovolemic shock:  Likely secondary to hemoperitoneum.  IR and surgery has been involving.  For the source of bleeding.  Transfuse when necessary.  Patient will be managed and monitored in ICU today.    Accelerated hypertension: Blood pressure stable.  Likely the whole home medications amlodipine at this moment.    DVT prophylaxis: Per critical care.  Code Status: Full  Consults: Critical care, general surgery, IR    Due to his acute illness, patient is expected to be in the hospital for more than 2 nights and we will admit him as an inpatient status.    Total time spent 75 min, >50% time spent for counseling and coordination of care.      Marilyn Batista MD  [facility] Hospitalist   Pager: [phone]   Office: [phone]

## 2021-06-08 NOTE — PROGRESS NOTES
Diabetes Care:    Note elevated BG.  TPN and now TF starting.  On 12 units of Lantus bid.  With BMI at 32, could use a resistant Regular insulin scale every 6 hours. Assess BG pattern daily and adjust doses as needed.     May need scheduled Regular every 6 hours to handle nutritional insulin needs of TF (then that dose would be held if TF interrupted or stopped. If use Lantus to cover both basal and nutritional insulin needs, be alert to low Bg if TF interrupted or stopped (D10 would need to be run at same rate then until TF restarted or Lantus wears off).

## 2021-06-08 NOTE — PROGRESS NOTES
Vent Mode: VCV  FiO2 (%):  [30 %-40 %] 30 %  S RR:  [14] 14  S VT:  [550 mL-600 mL] 550 mL  PEEP/CPAP (cm H2O):  [5 cm H2O] 5 cm H2O  Minute Ventilation (L/min):  [8.1 L/min-9.4 L/min] 8.7 L/min  PIP:  [27 cm H2O-40 cm H2O] 27 cm H2O  MAP (cm H2O):  [8-11] 8   Pt remain on above vent setting, plateau presser 15-18 peak presser 34. BS coarse bilateral, no weaning this shift. Rt will cont to follow up

## 2021-06-08 NOTE — ANESTHESIA PROCEDURE NOTES
Arterial Line  Reason for Procedure: hemodynamic monitoring and multiple ABGs  Patient location during procedure: Pre-op  Start time: 1/11/2017 9:19 AM  End time: 1/11/2017 9:39 AM  Staffing:  Performing  Anesthesiologist: DOROTHY BISHOP  Sterile Precautions:  sterile barriers used during insertion: cap, mask, sterile gloves, large sheet, and hand hygiene used.  Arterial Line:   Immediately prior to procedure a time out was called to verify the correct patient, procedure, equipment, support staff and site/side marked as required  Laterality: left  Location: brachial  Prepped with: ChloroPrep    Needle gauge: 20 G  Number of Attempts: 1  Secured with: tape and transparent dressing    1% lidocaine local anesthesia used for skin prep.   See MAR for additional medications given.  Ultrasound evaluation of access site: yes  Vessel patent by US exam

## 2021-06-08 NOTE — PROGRESS NOTES
PULMONARY / CRITICAL CARE PROGRESS NOTE    Date / Time of Admission:  1/9/2017  7:44 AM    Assessment:   Principal Problem:    Shock circulatory  Active Problems:    Hemorrhage, peritoneal    Acute blood loss anemia    Lactic acidosis    Alcohol withdrawal, uncomplicated    Respiratory failure, post-operative    Bleeding mesenteric varix  Coagulopathy  Liver cirrhosis  Hemorrhagic shock  Alcohol withdrawal with delirium  RLL nodule, 5mm       Advance Directives:  full      Plan:   Neuro:  Sedated with precedex and fentanyl  Scheduled ativan, increase dose    CVS:  Post of hypotension due to hemorrhagic shock  Follow hgb and transfuse as needed to keep hgb>7  Still on vaso and levophed but weaning. Vasodilated. Will aim for SBP>100    Pulm:  On MV post op. No plan for weaning  Sputum cx    GI:  Alcoholic liver cirrhosis. Bleeding mesenteric varix with hemoperitoneum  Post op now, bleeding is controlled  Changed abx to ertapenem since there was a question of diverticulitis   Protonix IV    :  MODESTA. Received contrast 2 days ago for CTA and angiography but probably just prerenal due to hemorrhage. Improving.   Good UO    Hem:  Hgb every 4 hours, keep > 7  Coagulopathy - received 4 units FFP, Kcentra and vitK.  Fibrinogen is above 100  4 PRBC's overnight prior to surgery and 7 units in the OR. 2 doses of calcium gluconate    Hgb stable overnight. No more need for PRBC's  INR looks fine. He is not bleeding.     Check procal, blood cx's, sputum cx and UA          ICU DAILY CHECKLIST                           Can patient transfer out of MICU? no    FAST HUG:    Feeding:  Feeding: No.  Patient is receiving NPO    Saeed:Yes  Analgesia/Sedation:Yes fentanyl and precedex  Thromboembolic prophylaxis: yes; Mode:  SCDs  HOB>30:  Yes  Stress Ulcer Protocol Active: yes; Mode: PPI  Glycemic Control: Any glucose > 180 no; Mode of Insulin Therapy: Sliding Scale Insulin    INTUBATED:  Can patient have daily waking:  no  Can patient  have spontaneous breathing trial:  no    Restraints? yes    PHYSICAL THERAPY AND MOBILITY:  Can patient have PT and mobility trial: no  Activity: Bed Rest    Critical Care Time greater than: 1 Hour  Total time spent with patient greater than: 1 Hour        Subjective:   HPI:  Babak Wolff is a 61 y.o. old male with a history of hypertension and alcohol use. He states that he's been dealing with on and off abdominal pain for the past 3 months. He describes the pain as sudden onset and his lower abdomen. This morning he had the same amount of pain particularly worse also tachypneic, felt short of breath and when he tried to stand up he collapsed. She doesn't think she lost consciousness and he states that he just got weak and his knees. He states that his bili is only somewhat distended but it felt more firm today  Emergency room he had a CT of the abdomen that showed the large intraperitoneal hematoma. Hemoglobin was decreased from 12.5-7.1 compared to a few months ago. LFTs slightly elevated including bilirubin and his INR is 1.68. Contour of the liver was irregular.  Initially hypotensive with elevated lactic acid. After he received a liter of normal saline has blood pressure stabilized. After that 2 units of PRBCs were ordered.    Principal Problem:    Shock circulatory  Active Problems:    Hemorrhage, peritoneal    Acute blood loss anemia    Lactic acidosis    Alcohol withdrawal, uncomplicated    Respiratory failure, post-operative      Allergies: Review of patient's allergies indicates no known allergies.     MEDS:  Scheduled Meds:    ertapenem (INVanz) IV  1 g Intravenous Q24H     folic acid  1 mg Oral DAILY    Or     folic acid (FOLVITE) IVPB  1 mg Intravenous DAILY     LORazepam  2 mg Intravenous Q6H     multivitamin therapeutic  1 tablet Oral DAILY     pantoprazole  40 mg Intravenous Q24H     senna-docusate  1 tablet Oral BID    Or     senna (SENOKOT) syrup  8.8 mg Enteral Tube BID     thiamine  100 mg  "Oral DAILY    Or     thiamine (vitamin B1) IVPB  100 mg Intravenous DAILY     Continuous Infusions:    dexmedetomidine 400 mcg/100 mL in NS (PRECEDEX) (4mcg/mL) 1.4 mcg/kg/hr (01/12/17 1010)     dextrose 5%-NaCl 0.9% 50 mL/hr (01/12/17 0922)     fentaNYL 1500 mcg/150 ml in NS (10 mcg/ml) 200 mcg/hr (01/12/17 1135)     insulin regular infusion 0.5 unit/mL 2 Units/hr (01/12/17 1127)     norepinephrine IV infusion in D5W 8 mcg/min (01/12/17 1139)     phenylephrine IV infusion in NS Stopped (01/12/17 0805)     vasopressin 100 units/100 ml (PITRESSIN)in D5W (1 unit/ml) 2.4 Units/hr (01/12/17 0800)     PRN Meds:.benzocaine-menthol, bisacodyl, dextrose 50 % (D50W), dextrose 50 % (D50W), diphenhydrAMINE, fentaNYL pf, gabapentin, glucagon (human recombinant), haloperidol lactate, hydrALAZINE, magnesium hydroxide, naloxone **OR** naloxone, ondansetron **OR** ondansetron, oxyCODONE, polyvinyl alcohol, Insert peripheral IV **AND** Saline lock IV **AND** sodium chloride      Objective:   VITALS:    Visit Vitals     BP (!) 104/38     Pulse 71     Temp (!) 101.4  F (38.6  C)  Comment: ice packs applied     Resp 14     Ht 6' 3\" (1.905 m)     Wt (!) 260 lb 9.6 oz (118.2 kg)     SpO2 97%     BMI 32.57 kg/m2     EXAM:  General appearance: intubated , sedated  Head: Normocephalic, without obvious abnormality, atraumatic  Lungs: clear to auscultation bilaterally  Heart: regular rate and rhythm, S1, S2 normal, no murmur, click, rub or gallop  Abdomen: s/p laparotomy, 2 ZACHARY drains in place. Abdomen is soft  Extremities: trace edema marta  Neurologic: sedated  Skin: warm to touch    I&O:      Intake/Output Summary (Last 24 hours) at 01/12/17 1143  Last data filed at 01/12/17 1100   Gross per 24 hour   Intake   9746 ml   Output  31365 ml   Net  -5339 ml       Data Review:    CBC:   Lab Results   Component Value Date    WBC 21.2 (H) 01/12/2017    WBC 6.1 06/16/2015    RBC 2.89 (L) 01/12/2017     BMP:   Lab Results   Component Value Date    CO2 " 19 (L) 01/12/2017    BUN 27 (H) 01/12/2017    CREATININE 1.22 01/12/2017    CALCIUM 7.1 (L) 01/12/2017     Serum Glucose range:Invalid input(s): GLUCOSEPOCT      By:  Chi Watson, 1/12/2017, 1:11 PM    Primary Care Physician:  Shaheen Smiley MD

## 2021-06-08 NOTE — PROGRESS NOTES
"Pharmacy Consult: Vancomycin Dosing    Pharmacist consulted to dose vancomycin for Babak Wolff, a 61 y.o. male.    Ordering provider: Joy Schwartz CNP    Indication for vancomycin therapy: Possible hospital acquired pneumonia    Goal Trough Range:  15-20 mcg/mL based on indication    Other current antimicrobials          ceFAZolin 2 g in 100 mL in D5W (ANCEF)    ertapenem 1 g in NaCl 0.9 % 50 mL (MINI-BAG Plus) (INVanz)        Subjective/Objective:    Patient was admitted for Shock circulatory on 1/9/2017    Height: 6' 3\" (1.905 m)    Actual Body Weight (ABW): (!) 108.6 kg (239 lb 6.7 oz)    Ideal body weight: 84.5 kg (186 lb 4.6 oz)  Adjusted ideal body weight: 94.1 kg (207 lb 8.7 oz)    BMI: Body mass index is 29.93 kg/(m^2).    No Known Allergies    Patient Active Problem List   Diagnosis     Male Erectile Disorder     Eczema     Lower Back Pain     Insomnia     Vitamin D Deficiency     Shock circulatory     Hemorrhage, peritoneal     Acute blood loss anemia     Lactic acidosis     Alcohol withdrawal, uncomplicated     Respiratory failure, post-operative     Encephalopathy     Cognitive disorder     Compulsive behaviors    Past Medical History   Diagnosis Date     Chronic abdominal pain      Insomnia         Temp Readings from Current Encounter:     01/18/17 0200 01/18/17 0400 01/18/17 0800   Temp: 98.4  F (36.9  C) 98.5  F (36.9  C) 100.2  F (37.9  C)     Net Intake/Output (last 24 hours):  I/O last 3 completed shifts:  In: 3146 [I.V.:951; NG/GT:390; IV Piggyback:100]  Out: 4930 [Urine:1335; Drains:3595]    Recent Labs      01/16/17   0408  01/16/17   1601  01/17/17   0442  01/18/17   0501   WBC  8.3   --   9.5  14.7*   NEUTROABS   --    --   7.0  10.5*   BUN  14  14  15  15  19   CREATININE  0.72  0.63*  0.74  0.73  0.81     Estimated Creatinine Clearance: 130.7 mL/min (by C-G formula based on Cr of 0.81).    Recent Labs      01/15/17   1412   CULTURE  No Growth       Results for orders placed or " performed during the hospital encounter of 01/09/17   Urine culture    Collection Time: 01/15/17  2:12 PM   Result Value Status    Culture No Growth Final   Sputum culture    Collection Time: 01/12/17  4:16 PM   Result Value Status    Culture No Growth Final       No results for input(s): VANCOMYCIN in the last 168 hours.    Vancomycin administrations: (last 120 hours)     None          Assessment/Plan:    Pharmacist consulted to dose vancomycin for Pneumonia, goal trough range 15-20 mcg/mL.  1. Initiate vancomycin 1500 mg IV every 12 hours (13.8 mg/kg actual body weight).  2. No vancomycin level available for assessment.  3. Pharmacist will plan to check a vancomycin trough level prior to 4th dose.  4. Pharmacist will continue to follow.    Thank you for the consult.  Kathleen London, PharmD 1/18/2017 10:04 AM

## 2021-06-08 NOTE — PROGRESS NOTES
Patient tolerated paracentesis procedure. , /61, O2 sats 93% on 4L and RR 23 post procedure. Pressure applied to puncture site. Update to bedside RNLesli when back to room 349. Ramona Avila RN

## 2021-06-08 NOTE — PROGRESS NOTES
"  Clinical Nutrition Therapy Reassessment Note      Reason for Assessment:   Babak Wolff is a 61 y.o. male assessed by the registered Dietitian for nutrition support.    Current Nutrition Prescription:   Diet: NPO  TPN of D20 8 AA @ 75cc/hr with 20% IL 4 times per week  Supplements and Modulars:   Flush Orders:   Propofol Orders:  IV dextrose or Fluids:  dexmedetomidine 400 mcg/100 mL in NS (PRECEDEX) (4mcg/mL) Last Rate: 0.8 mcg/kg/hr (01/18/17 0407)   dextrose 10%    sodium chloride 0.45% Last Rate: 25 mL/hr (01/18/17 6575)   TPN - Custom formula Last Rate: 75 mL/hr at 01/17/17 2122       Current Nutrition Intake:  T{M ;ome os Cemtra;/  Ot [rpvodes 2014 kcals, 144 gms protein, 360 gms CHO,  And 1800cc gluid    Pt ZACHARY drains are putting out approx 100cc/hr    TPN appears to vest meet nutritional needs at this time          Anthropometrics:  Height: 6' 3\" (190.5 cm)  Admission weight:  Weight: (!) 239 lb 6.7 oz (108.6 kg)  BMI (Calculated): 32  BMI indication: 30-34.9 obesity (class 1)  Ideal body weight 82 kg  Weight History:no hx of wt loss    Physical Findings:  The patient has the following physical signs which could indicate malnutrition: edema and reduced  strength       GI Status/Output:   The patient's GI symptoms include: flatus    Bowel Sounds hypoactive    Skin/Wound:  Bertin score Bertin Scale Score: 13    Medications:  Medications reviewed.    Labs:  Labs reviewed    Malnutrition AssessmentPt did not come in with any malnutrition criteria met.  He is at risk for due to edema, recent OR, and reliance on tpn for nutrition    Nutrition Risk Level: stable-high risk    Nutrition dx:Inadequate oral po intake r/t gut malfunction evidenced by TPN    Goal:transition to po and taper off tpn,  Maintain wt     Intervention:TPN of D20 8AA @ 75cc/hr with IL 4 times per week    Monitoring:when able to transition to po and taper off tpn    See Care Plan for Problems, Goals, and Interventions.  "

## 2021-06-08 NOTE — PROGRESS NOTES
PULMONARY / CRITICAL CARE PROGRESS NOTE    Date / Time of Admission:  1/9/2017  7:44 AM    Assessment:   Principal Problem:    Shock circulatory  Active Problems:    Hemorrhage, peritoneal    Acute blood loss anemia    Lactic acidosis    Alcohol withdrawal, uncomplicated    Respiratory failure, post-operative    Bleeding mesenteric varix  Coagulopathy  Liver cirrhosis  Hemorrhagic shock  Alcohol withdrawal with delirium  RLL nodule, 5mm       Advance Directives:  full      Plan:   Neuro:  Sedated with precedex and fentanyl  Scheduled ativan    CVS:  Post of hypotension due to hemorrhagic shock  Follow hgb and transfuse as needed to keep hgb>7  Still on vaso and levophed but weaning. Vasodilated. Will aim for SBP>100    Pulm:  On MV post op. No plan for weaning  Sputum cx pending  Decrease VT since he is getting alkalotic    GI:  Alcoholic liver cirrhosis. Bleeding mesenteric varix with hemoperitoneum  Post op now, bleeding is controlled  Changed abx to ertapenem since there was a question of diverticulitis   Protonix IV    :  MODESTA. Received contrast 2 days ago for CTA and angiography but probably just prerenal due to hemorrhage. Improving.   Good UO  No maintenance fluids since he is still 3 kg up since surgery. Got aggressively transfused and hydrated    Hem:  Hgb every 4 hours, keep > 7  Coagulopathy - received 4 units FFP, Kcentra and vitK.  Fibrinogen is above 100  4 PRBC's overnight prior to surgery and 7 units in the OR. 2 doses of calcium gluconate    Hgb stable overnight. No more need for PRBC's  INR looks fine. He is not bleeding.     Will stop insulin gtt and start lantus with SSI  No more fever and leukocytosis is improving  Cx's negative so far          ICU DAILY CHECKLIST                           Can patient transfer out of MICU? no    FAST HUG:    Feeding:  Feeding: No.  Patient is receiving NPO    Saeed:Yes  Analgesia/Sedation:Yes fentanyl and precedex  Thromboembolic prophylaxis: yes; Mode:   SCDs  HOB>30:  Yes  Stress Ulcer Protocol Active: yes; Mode: PPI  Glycemic Control: Any glucose > 180 no; Mode of Insulin Therapy: Sliding Scale Insulin and Lantus    INTUBATED:  Can patient have daily waking:  no  Can patient have spontaneous breathing trial:  no    Restraints? yes    PHYSICAL THERAPY AND MOBILITY:  Can patient have PT and mobility trial: no  Activity: Bed Rest    Critical Care Time greater than: 40 min   Total time spent with patient greater than: 40 min        Subjective:   HPI:  Babak Wolff is a 61 y.o. old male with a history of hypertension and alcohol use. He states that he's been dealing with on and off abdominal pain for the past 3 months. He describes the pain as sudden onset and his lower abdomen. This morning he had the same amount of pain particularly worse also tachypneic, felt short of breath and when he tried to stand up he collapsed. She doesn't think she lost consciousness and he states that he just got weak and his knees. He states that his bili is only somewhat distended but it felt more firm today  Emergency room he had a CT of the abdomen that showed the large intraperitoneal hematoma. Hemoglobin was decreased from 12.5-7.1 compared to a few months ago. LFTs slightly elevated including bilirubin and his INR is 1.68. Contour of the liver was irregular.  Initially hypotensive with elevated lactic acid. After he received a liter of normal saline has blood pressure stabilized. After that 2 units of PRBCs were ordered.    Principal Problem:    Shock circulatory  Active Problems:    Hemorrhage, peritoneal    Acute blood loss anemia    Lactic acidosis    Alcohol withdrawal, uncomplicated    Respiratory failure, post-operative      Allergies: Review of patient's allergies indicates no known allergies.     MEDS:  Scheduled Meds:    chlorhexidine  15 mL Topical Q12H     ertapenem (INVanz) IV  1 g Intravenous Q24H     folic acid  1 mg Oral DAILY    Or     folic acid (FOLVITE) IVPB  1  "mg Intravenous DAILY     insulin aspart (NovoLOG) injection   Subcutaneous Q4H FIXED TIMES     insulin glargine  8 Units Subcutaneous QAM     LORazepam  1 mg Intravenous Q6H     multivitamin therapeutic  1 tablet Oral DAILY     pantoprazole  40 mg Intravenous Q24H     senna-docusate  1 tablet Oral BID    Or     senna (SENOKOT) syrup  8.8 mg Enteral Tube BID     thiamine  100 mg Oral DAILY    Or     thiamine (vitamin B1) IVPB  100 mg Intravenous DAILY     Continuous Infusions:    dexmedetomidine 400 mcg/100 mL in NS (PRECEDEX) (4mcg/mL) 0.8 mcg/kg/hr (01/13/17 1044)     dextrose 5%-NaCl 0.9% 50 mL/hr (01/13/17 0632)     fentaNYL 1500 mcg/150 ml in NS (10 mcg/ml) 200 mcg/hr (01/13/17 0919)     insulin regular infusion 0.5 unit/mL 1.5 Units/hr (01/13/17 0818)     norepinephrine IV infusion in D5W 7 mcg/min (01/13/17 1057)     phenylephrine IV infusion in NS Stopped (01/12/17 0805)     vasopressin 100 units/100 ml (PITRESSIN)in D5W (1 unit/ml) 2.4 Units/hr (01/13/17 1058)     PRN Meds:.acetaminophen, benzocaine-menthol, bisacodyl, dextrose 50 % (D50W), dextrose 50 % (D50W), dextrose 50 % (D50W), diphenhydrAMINE, fentaNYL pf, gabapentin, glucagon (human recombinant), glucagon (human recombinant), hydrALAZINE, magnesium hydroxide, naloxone **OR** naloxone, ondansetron **OR** ondansetron, oxyCODONE, polyvinyl alcohol, Insert peripheral IV **AND** Saline lock IV **AND** sodium chloride      Objective:   VITALS:    Visit Vitals     /47 (Patient Position: Lying)     Pulse 60     Temp 99.3  F (37.4  C) (Oral)     Resp 14     Ht 6' 3\" (1.905 m)     Wt (!) 257 lb 8 oz (116.8 kg)     SpO2 98%     BMI 32.18 kg/m2     EXAM:  General appearance: intubated , sedated. Jaundiced  Head: Normocephalic, without obvious abnormality, atraumatic  Lungs: clear to auscultation bilaterally  Heart: regular rate and rhythm, S1, S2 normal, no murmur, click, rub or gallop  Abdomen: s/p laparotomy, 2 ZACHARY drains in place. Abdomen is " soft  Extremities: trace edema marta  Neurologic: sedated  Skin: warm to touch    I&O:      Intake/Output Summary (Last 24 hours) at 01/13/17 1114  Last data filed at 01/13/17 1045   Gross per 24 hour   Intake 4002.1 ml   Output   4810 ml   Net -807.9 ml       Data Review:    CBC:   Lab Results   Component Value Date    WBC 15.4 (H) 01/13/2017    WBC 6.1 06/16/2015    RBC 2.60 (L) 01/13/2017     BMP:   Lab Results   Component Value Date    CO2 20 (L) 01/13/2017    BUN 26 (H) 01/13/2017    CREATININE 0.84 01/13/2017    CALCIUM 7.0 (L) 01/13/2017     Serum Glucose range:Invalid input(s): GLUCOSEPOCT      By:  Chi Watson, 1/13/2017, 1:11 PM    Primary Care Physician:  Shaheen Smiley MD

## 2021-06-08 NOTE — PROGRESS NOTES
"Pharmacy Note: Total Parenteral Nutrition (TPN) Management    Pharmacist consulted to dose TPN for Babak Wolff, a 61 y.o. male by Dr. Shannon.    Subjective:    The patient is a new TPN start.    The patient was started on TPN in the hospital on 1/14/17.    Indication for TPN therapy: GI tract is not functional: Hemoperitoneum and Sepsis    Inadequate nutrition existing for > 7 days.     Enteral nutrition contraindicated due to: Hemoperitoneum.    Pertinent diseases and other special considerations hepatic failure    Social History   Substance Use Topics     Smoking status: Never Smoker     Smokeless tobacco: Former User     Types: Chew     Alcohol use 3.0 - 3.5 oz/week     6 - 7 drink(s) per week     Objective:    Height: 6' 3\" (1.905 m)    Weight: (!) 110.8 kg (244 lb 4.3 oz)    Body mass index is 30.53 kg/(m^2).    Patient Vitals for the past 96 hrs:   Weight   01/17/17 0100 (!) 110.8 kg (244 lb 4.3 oz)   01/16/17 0015 (!) 116.5 kg (256 lb 13.4 oz)   01/15/17 0100 (!) 115.7 kg (255 lb 1.2 oz)   01/13/17 2344 (!) 116.3 kg (256 lb 6.3 oz)       Labs:  Last 3 days:  Recent Labs      01/14/17   1122  01/14/17   1810  01/15/17   0419  01/15/17   1142  01/15/17   1840  01/16/17   0408  01/16/17   0751  01/16/17   1601  01/16/17   2153  01/17/17   0442   NA  138   --   138  138   --    --   144   --   145   --   146*  146*   K  3.9   --   3.5  3.5  3.6   --   3.3*  3.5  3.7  3.7  3.8  3.9  3.9   CL  113*   --   112*  112*   --    --   115*   --   116*   --   116*  116*   CO2  21*   --   23  23   --    --   22   --   25   --   23  24   BUN  19   --   17  17   --    --   14   --   14   --   15  15   CREATININE  0.63*   --   0.66*  0.66*   --    --   0.72   --   0.63*   --   0.74  0.73   GLU  123   --   147*  147*   --    --   165*   --   175*   --   140*  147*   CALCIUM  7.1*   --   7.3*  7.3*   --    --   7.7*   --   7.5*   --   7.9*  7.9*   MG  1.9   --   2.0   --    --   1.8   --    --    --   1.8  " 1.8   PHOS  2.7   --   2.7   --    --    --    --    --    --   2.4*  2.3*   PROT  4.4*   --   4.5*   --    --   4.7*   --    --    --   4.6*  4.8*   ALBUMIN  2.6*   --   2.5*   --    --   2.4*   --    --    --   2.8*  2.8*   PREALBUMIN  8.0*   --    --    --    --    --    --    --    --   5.8*   TRIG  134   --    --    --    --    --    --    --    --   102   HGB   --   7.4*  7.3*   --   7.3*  7.7*   --    --    --   7.4*   HCT   --   22.1*  21.2*   --    --   23.4*   --    --    --   22.6*   PLT   --   121*  110*   --    --   123*   --    --    --   116*   BILITOT  5.0*   --   4.1*   --    --   4.3*   --    --    --   4.3*  4.3*   AST  100*   --   83*   --    --   87*   --    --    --   71*  70*   ALT   --    --   32   --    --   32   --    --    --   26   ALKPHOS  49   --   51   --    --   59   --    --    --   47  46   INR  1.48*   --    --    --    --   1.63*   --    --    --   1.68*       Fingerstick Blood Glucose (past 48 hours):   Recent Labs      01/15/17   1132  01/15/17   1614  01/15/17   2001  01/15/17   2349  01/16/17   0410  01/16/17   0734  01/16/17   1126  01/16/17   1755  01/17/17   0028  01/17/17   0609   POCGLUFGR  157  179  171  166  188  174  175  183  168  166       Intake/Output (last 24 hours): I/O last 3 completed shifts:  In: 3956.3 [I.V.:1328; NG/GT:100; IV Piggyback:500]  Out: 71889 [Urine:2067; Emesis/NG output:100; Drains:9055]    Estimated CrCl: Estimated Creatinine Clearance: 144.4 mL/min (by C-G formula based on Cr of 0.74).    Assessment:    Initiate patient on TPN therapy as a continuous central therapy.     Given the patient's current condition/oral intake, PN is still indicated.    Lab results reviewed: Electrolytes are near normal. Ionized calcium is normal. Potassium has now corrected after increase in TPN and bumps yesterday. Phosphorus was a little low and had decreased since initiation of TPN, will increase in TPN today. Sodium is trending up (146 today), will  decrease slightly in TPN. Patient is acidotic, so will maximize acetate in the TPN.    Blood glucose levels have been mid to upper 100s. Lantus dose was increased yesterday so will not add any insulin to TPN.  Patient does have orders for Novolog and Lantus.    Plan:  1. Rate of TPN: 75 mL/hr. Maintenance IV fluid rate will be adjusted appropriately to meet the total maximum IV fluids rate as ordered by provider.  2. Formula:     Amino Acids 8 %    Dextrose 20 %    Sodium 65 mEq/L    Potassium 35 mEq/L    Magnesium 5 mEq/L    Calcium 4.5 mEq/L    Phosphorus 15 mMol/L    Chloride: Acetate Ratio = Max Acetate    Standard Multivitamins    Trace Elements  3. Fat Emulsion: 250 ml, 4 times weekly on Sun, Tue, Thu, and Sat. Propofol infusion has been discontinued.  4. Check BMP, mag, and phos tomorrow as ordered.  5. Pharmacist will continue to follow the patient's lab results, clinical status and blood glucose results and make adjustments as appropriate.    Thank you for the consult.  Kathleen London, PharmD 1/17/2017 10:31 AM

## 2021-06-08 NOTE — PROGRESS NOTES
Critical Care Progress Note  1/17/2017   1:01 PM    Admit Date: 1/9/2017  ICU Day: 8  Code Status: full code      Problem List:   Principal Problem:    Shock circulatory  Active Problems:    Hemorrhage, peritoneal    Acute blood loss anemia    Lactic acidosis    Alcohol withdrawal, uncomplicated    Respiratory failure, post-operative    Encephalopathy    Cognitive disorder    Compulsive behaviors          Plan:   1. Drains per surgery - still putting out quite a bit, nursing emptying Q1-h.   2. Start gabapentin 250mg Q-8h down enteral tube.   3. Follow QTc; would avoid excessive use of Haldol and come down on Precedex as able. Scheduled Ativan and benadryl through today. Appreciate Psych input.   4. Follow labwork. Transfuse hemoglobin < 7. Currently stable.   5. Supplemental oxygen as needed to maintain saturations > 92%  6. Continue ertapenem for total 8-days  7. No bowel movement since admission; senna, milk of mag, fleets, dulcolax, miralax all available.     ICU Prophylaxis   Feeding: NPO - TPN/lipids  Saeed: yes - keep in place for accurate I/O  Analgesia/Sedation: precedex  DVT prophylaxis: SCDs  HOB > 30 degrees: yes   GI Proph: pantoprazole   Glycemic Control: SSI Q6-h + lantus 10U BID   Family updated: yes - sister and brother at bedside    Dispo: ICU due to agitation requiring precedex infusion.     Joy Schwartz, [facility] Pulmonary/Critical Care    Total critical care time: 35-minutes  _______________________________________________________________    HPI: Babak Wolff is a 61 y.o. old male with a history of hypertension and alcohol use. He states that he's been dealing with on and off abdominal pain for the past 3 months. He describes the pain as sudden onset and his lower abdomen. The morning of admission, he had the same amount of pain and was also tachypneic, felt short of breath and when he tried to stand up he collapsed. No loss of consciousness.  Emergency room he had a CT of the  "abdomen that showed the large intraperitoneal hematoma. Hemoglobin was decreased from 12.5-7.1 compared to a few months ago. LFTs slightly elevated including bilirubin and his INR is 1.68. Contour of the liver was irregular.  Initially hypotensive with elevated lactic acid. After he received a liter of normal saline has blood pressure stabilized. After that 2 units of PRBCs were ordered..    Brief hospital summary:   1/9 Admission; transfused 2U PRBC. CIWA initiated for withdrawal.   1/10 Increasing shortness of breath due to ascites overload; had paracentesis with 7.5 liters removed.  1/11 Precedex added for agitation; MODESTA, hypotensive, hemoglobin drop to 6; transfused 4-units PRBC, 4U FFP, vitamin K;. KCentra given. CT demonstrated large volume hemoperitoneum. Taken to OR by Dr. Hodge - found bleeding right pericolic mesenteric varices. Returned to ICU intubated. Hypotensive, required Levophed and Vaso.   1/12 Abx changed to ertapenem due to question of diverticulitis  1/14 PICC Placed; large amount of fluid from ZACHARY drains - liters. Drains clamped.   1/15 Extubated  1/16 Psych consulted to help with behavior; QTc 500. ZACHARY drains unclamped - 4L removed before noon. TPN started.     ROS: answers \"no\" to pain/shortness of breath/nausea.     Events over last 24-hours: stable night in ICU.     Objective:     Vitals:    01/17/17 0900 01/17/17 1000 01/17/17 1100 01/17/17 1200   BP: 99/50 92/50 91/52 91/58   Patient Position:       Pulse: 79 61 60 65   Resp: 27 23 24 (!) 29   Temp:       TempSrc:       SpO2: 95% 94% 95% 96%   Weight:       Height:           Vent:   Day of Intubation: 1/11 - 15    Sedative Infusion: precedex  Sedation vacation: N/A    I/O:   Intake/Output Summary (Last 24 hours) at 01/17/17 1301  Last data filed at 01/17/17 1300   Gross per 24 hour   Intake 3686.25 ml   Output   6552 ml   Net -2865.75 ml     Wt Readings from Last 3 Encounters:   01/17/17 (!) 244 lb 4.3 oz (110.8 kg)   12/05/16 (!) 254 lb " (115.2 kg)   12/30/15 (!) 256 lb 6.4 oz (116.3 kg)      Weight change: -12 lb 9.1 oz (-5.7 kg)    Physical Exam:  Gen: sedated but arousable; no acute distress  HEENMT:PERRLA, extra ocular movement intact and icteric sclera  NEURO: moves all extremities; wakes to name.   CARDIOVASCULAR: S1, S2 normal, no murmur, rub or gallop, regular rate and rhythm  PULMONARY: chest clear, no wheezing, rales, normal symmetric air entry, no tachypnea, retractions or cyanosis, Heart exam - S1, S2 normal, no murmur, no gallop, rate regular  GASTROINTESTINAL: midline incision clean and intact with staples; JPs x 2 with yellow fluid; active bowel sounds.   INTEGUMENT: jaundice  PSYCH: sedated on precedex    Labs:   Results from last 7 days  Lab Units 01/17/17  0442   LN-SODIUM mmol/L 146*  146*   LN-POTASSIUM mmol/L 3.9  3.9   LN-CHLORIDE mmol/L 116*  116*   LN-CO2 mmol/L 23  24   LN-BLOOD UREA NITROGEN mg/dL 15  15   LN-CREATININE mg/dL 0.74  0.73   LN-CALCIUM mg/dL 7.9*  7.9*   LN-PROTEIN TOTAL g/dL 4.6*  4.8*   LN-BILIRUBIN TOTAL mg/dL 4.3*  4.3*   LN-ALKALINE PHOSPHATASE U/L 47  46   LN-ALT (SGPT) U/L 26   LN-AST (SGOT) U/L 71*  70*         Results from last 7 days  Lab Units 01/17/17  0442   LN-WHITE BLOOD CELL COUNT thou/uL 9.5   LN-HEMOGLOBIN g/dL 7.4*   LN-HEMATOCRIT % 22.6*   LN-PLATELET COUNT thou/uL 116*       Micro: no growth to date    Imaging: all imaging personalized reviewed   1/17 CXR - Enteric tube entering the stomach. PICC line in the SVC. Mild infiltrate or atelectasis in both lung bases improved on the left side but slightly increased on the right side. Otherwise no change.    1/17 AXR - Partially visualized nasogastric tube within the region of the stomach. Gas within mildly dilated loops of small bowel. Gas within normal caliber transverse colon. Bowel gas pattern suggestive of adynamic ileus. Laparotomy kandice.      Joy A. Wieben, [facility] Pulmonary/Critical Care

## 2021-06-08 NOTE — PROGRESS NOTES
"Pharmacy Note: Total Parenteral Nutrition (TPN) Management    Pharmacist consulted to dose TPN for Babak Wolff, a 61 y.o. male by Dr. Shannon.    Subjective:    The patient is a new TPN start.    The patient was started on TPN in the hospital on 1/14/17.    Indication for TPN therapy: GI tract is not functional: Hemoperitoneum and Sepsis    Inadequate nutrition existing for > 7 days.     Enteral nutrition contraindicated due to: Hemoperitoneum.    Pertinent diseases and other special considerations hepatic failure    Social History   Substance Use Topics     Smoking status: Never Smoker     Smokeless tobacco: Former User     Types: Chew     Alcohol use 3.0 - 3.5 oz/week     6 - 7 drink(s) per week     Objective:    Height: 6' 3\" (1.905 m)    Weight: (!) 116.3 kg (256 lb 6.3 oz)    Body mass index is 32.05 kg/(m^2).    Patient Vitals for the past 96 hrs:   Weight   01/13/17 2344 (!) 116.3 kg (256 lb 6.3 oz)   01/13/17 0000 (!) 116.8 kg (257 lb 8 oz)   01/12/17 0400 (!) 118.2 kg (260 lb 9.6 oz)   01/11/17 0000 (!) 117.2 kg (258 lb 4.8 oz)       Labs:  Last 3 days:  Recent Labs      01/11/17 2027 01/11/17   2102  01/11/17   2103  01/12/17   0023   01/12/17   0411  01/12/17   0412  01/12/17   0757  01/12/17   1622  01/12/17   2037  01/13/17   0400  01/13/17   1414  01/14/17   0544  01/14/17   1122   NA   --   132*   --    --    --   133*   --    --    --    --   134*   --   138  138   K   --   4.5   --    --    --   4.1   --    --    --    --   4.0   --   3.7  3.9   CL   --   110*   --    --    --   110*   --    --    --    --   111*   --   112*  113*   CO2   --   18*   --    --    --   19*   --    --    --    --   20*   --   21*  21*   BUN   --   31*   --    --    --   27*   --    --    --    --   26*   --   20  19   CREATININE   --   1.49*   --    --    --   1.22   --    --    --    --   0.84   --   0.68*  0.63*   GLU   --   186*   --    --    --   123   --    --    --    --   124   --   136*  123   CALCIUM   -- "   6.8*   --    --    --   7.1*   --    --    --    --   7.0*   --   7.3*  7.1*   MG   --    --   2.4   --    --   2.3   --    --    --    --   2.1   --   2.0  1.9   PHOS   --    --    --    --    --    --    --    --    --    --    --    --    --   2.7   PROT   --    --    --    --    --   4.1*   --    --    --    --   4.0*   --   4.5*  4.4*   ALBUMIN   --    --    --    --    --   2.6*   --    --    --    --   2.2*   --   2.5*  2.6*   PREALBUMIN   --    --    --    --    --    --    --    --    --    --    --    --    --   8.0*   TRIG   --    --    --    --    --    --    --    --    --    --    --    --    --   134   HGB  9.2*  9.2*   --   8.7*   --    --   8.7*  8.5*  7.8*  7.8*  7.9*  7.9*  7.9*   --    HCT   --    --    --    --    --    --   25.4*   --    --    --   23.1*   --   23.0*   --    PLT   --    --    --    --    --    --   174   --    --    --   146   --   138*   --    BILITOT   --    --    --    --    --   5.0*   --    --    --    --   5.4*   --   5.1*  5.0*   AST   --    --    --    --    --   228*   --    --    --    --   166*   --   111*  100*   ALT   --    --    --    --    --   47*   --    --    --    --   46*   --   37   --    ALKPHOS   --    --    --    --    < >  50   --    --    --    --   49   --   52  49   INR   --   1.60*   --    --    --   1.47*   --    --    --    --    --    --    --   1.48*    < > = values in this interval not displayed.       Fingerstick Blood Glucose (past 48 hours): Recent Labs      01/13/17   0629  01/13/17   0817  01/13/17   1054  01/13/17   1202  01/13/17   1543  01/13/17   1930  01/13/17   2354  01/14/17   0401  01/14/17   0747  01/14/17   1228   POCGLUFGR  141  133  143  141  137  108  158  150  144  140       Intake/Output (last 24 hours): I/O last 3 completed shifts:  In: 3850.7 [I.V.:2450.7; IV Piggyback:1400]  Out: 3985 [Urine:1575; Drains:2410]    Estimated CrCl: Estimated Creatinine Clearance: 173.6 mL/min (by C-G formula based on Cr of  0.63).    Assessment:    Initiate patient on TPN therapy as a continuous central therapy.     Given the patient's current condition/oral intake, PN is still indicated.    Lab results reviewed: Electrolytes are near normal, but patient is acidotic, so will maximize acetate in the TPN.    Plan:  1. Rate of TPN: 25 mL/hr initially, increase by 25 mL/hr every 8 hour to goal rate of 75 mL/hr. Maintenance IV fluid rate will be adjusted appropriately to meet the total maximum IV fluids rate as ordered by provider.  2. Formula:     Amino Acids 7 %    Dextrose 20 %    Sodium 70 mEq/L    Potassium 30 mEq/L    Magnesium 5 mEq/L    Calcium 4.5 mEq/L    Phosphorus 12 mMol/L    Chloride: Acetate Ratio = Max Acetate    Standard Multivitamins    Trace Elements  3. Fat Emulsion: on hold while patient is receiving propofol infusion.  4. Check labs tomorrow as ordered.  5. Pharmacist will continue to follow the patient's lab results, clinical status and blood glucose results and make adjustments as appropriate.    Thank you for the consult.  Swetha Wolf, PharmD 1/14/2017 3:16 PM

## 2021-06-08 NOTE — PROGRESS NOTES
"  RESPIRATORY CARE NOTE     Patient Name: Babak Wolff  Today's Date: 1/15/2017          Visit Vitals     /54     Pulse 91     Temp 98.5  F (36.9  C) (Axillary)     Resp 16     Ht 6' 3\" (1.905 m)     Wt (!) 255 lb 1.2 oz (115.7 kg)     SpO2 96%     BMI 31.88 kg/m2           Vent Mode: CPAP/PSV  FiO2 (%):  [25 %-35 %] 35 %  S RR:  [14] 14  S VT:  [550 mL] 550 mL  PEEP/CPAP (cm H2O):  [5 cm H2O] 5 cm H2O  Minute Ventilation (L/min):  [7.7 L/min-11 L/min] 10.5 L/min  PIP:  [10 cm H2O-33 cm H2O] 10 cm H2O  RI SUP:  [5 cm H20] 5 cm H20  MAP (cm H2O):  [6-11] 6    SBT done x 75 minutes on cpap 5  Ps 5 35%, RR 13,  rSBI 21  0930: Leak test done: patient extubated and placed on NC 3L, sats 96%.  Suctioned for pale yellow secretions.         Norma GARNRE Pradeep, LRT  "

## 2021-06-08 NOTE — PROGRESS NOTES
"Clinical Nutrition Therapy Assessment Note    Reason for Assessment:  length of stay screen and Bertin score.60 yo male with cirr. Shock interabdominal hemmorhage, resp failure intubated  ETOH hx    Nutrition History:  Information obtained from chart.  Patients diet prior to admission has been unknown, assume reg.   Recent food/fluid intake has been unknown, stated it was fine on admit.   Patient has been consuming the following supplements none.   Patent has the following cultural/Rastafari food needs or preferences: none  Patient has the following food allergies or intolerances: NKFA    Current Nutrition Prescription:   Diet: NPO  Supplements and Modulars: NPO  Flush Orders:   Propofol Orders:   IV dextrose or Fluids: 50cc/hr  ID @ 1.0 u /hr  nacl 0.9%       Current Nutrition Intake  Chart screened secondary to pt has been NPO or CL for 3 days.  Went to OR yesterday  Did not have any risk factors on admit risk screen for nutritional risk  Does drink and is getting folic acid, thiamin and mutivits.  He does not have any phusical signs of malnutrition, no fat or muscle wasting, he is weak as he is intubated  He is NPO, recent surgery, and now intubated all put him at moderate nutrition risk, in addition to ETOH    Total nutrient intake from all sources does not meet estimated nutritional needs.    Anthropometrics:  Height: 75\"  Most recent weight: 118.2 kg        Admit   116.2 kg  BMI:BMI (Calculated): 31.2  BMI indication: 30-34.9 obesity (class 1)  Weight History: stable    Physical Findings:  The patient has the following physical signs which could indicate malnutrition: edema and reduced  strength       GI Status/Output:   The patient's GI symptoms include: abdominal pain and abdominal distention    Bowel Sounds Absent    Skin/Wound:  Bertin score 14    Medications:  Medications reviewed.    Labs:  Labs reviewed     Assessed Nutritional Needs:  Assessment weight is 99kg, with a weight source of current " BW.    Estimated Energy Needs: 0466-3274 kcals (22-25 kcal/kg)     Estimated Protein Needs: 119-149 gm (1.2-1.5 gm/kg)    Estimated Fluid Needs: 3755-1757 mls (25-30 mls/kg)    Malnutrition Assessment:  .  Does not qualify for malnutrition at this time, but is at risk due to NPO, vent, recent OR  Nutrition Risk Level: stable-high risk    Nutrition Dx:Inadequate oral po intake r/t resp issues evidenced by NPO    Goal: Nutrition next 48 to 72 hours via most appropriate route    Intervention:monitor stability and plan of care for nutrition    Monitoring:for nutrition start      See Care Plan for further Problems, Goals, and Interventions.

## 2021-06-08 NOTE — PROGRESS NOTES
Clinical Nutrition Therapy Reassessment Note Parenteral    Reason for Assessment:  nutrition support/TPN    Current Nutrition Prescription:   Diet: NPO  TPN: D20 AA8 @ 75 cc/hr  250 ml of 20% lipids 4 times per week    Per MD note can hopefully start po diet soon, may need swallow eval    Current Nutrition Intake:  Parenteral nutrition access is a centeral line. The current nutritonal order will provide, 2085 kcals, 144 grams protein, 360 grams carbohydrate, and 1800 mls fluid daily (lipids not included).     Total nutrient intake from all sources meets estimated nutritional needs.    Anthropometrics:  Most recent weight: 102.27 Kg    GI Status/Output:   The patient's GI symptoms include: constipation relieved by laxative  Bowel Sounds present    Skin/Wound:  Bertin score      Medications:  Medications reviewed.    Labs:  Glu 205    Nutrition Risk Level: high risk    Nutrition Dx: Inadequate po intake related to altered GI function (hemoperitoneum/ruptured mesenteric varix/liver cirrhosis with ascites) as evidenced by need for TPN/nutrition support    Goal:   Meet nutritional needs via TPN until able to transition to po diet  Glucose control ()    Intervention:  No changes recommended in TPN today    Monitoring:  Diet advance, TPN tolerance, nutritional needs, plan of care      See Care Plan for further Problems, Goals, and Interventions.

## 2021-06-08 NOTE — H&P
Admission History and Physical   Babak Wolff,  1955, MRN [id number]    PCP: Shaheen Smiley MD, [phone]   Code status:  Full Code       Extended Emergency Contact Information  Primary Emergency Contact: Ashely Albright   [facility]  Home Phone: [phone]  Mobile Phone: [phone]  Relation: Significant Other       Assessment and Plan     Principal Problem:    Shock circulatory  Active Problems:    Hemorrhage, peritoneal  Lactic acidosis  Probable liver cirrhosis due to alcohol use  Acute blood loss anemia  Coagulopathy  MODESTA  RLL nodule, 5mm    Neuro:  Awake, oriented. No signs of withdrawal  Start CIWA protocol    CVS:  Stable BP. Continue NS at 125 and follow hgb every 4 hours  It seems that his bleeding has stopped. Will undergo angiography today - ? Intraperitoneal varix  Lactic acid is improving, expect delayed clearance due to liver disease    Pulm:  No issues    GI:  NPO  Probable liver cirrhosis due to alcohol use    Hem:  Acute blood loss anemia. 2 units PRBC's and follow hgb every 4 hours    40 min critical acre time       Chief Complaint: Acute blood loss     HPI:    Babak Wolff is a 61 y.o. old male with a history of hypertension and alcohol use.  He states that he's been dealing with on and off abdominal pain for the past 3 months.  He describes the pain as sudden onset and his lower abdomen.  This morning he had the same amount of pain particularly worse also tachypneic, felt short of breath and when he tried to stand up he collapsed.  She doesn't think she lost consciousness and he states that he just got weak and his knees.  He states that his bili is only somewhat distended but it felt more firm today  Emergency room he had a CT of the abdomen that showed the large intraperitoneal hematoma.  Hemoglobin was decreased from 12.5-7.1 compared to a few months ago.  LFTs slightly elevated including bilirubin and his INR is 1.68.  Contour of the liver was  irregular.  Initially hypotensive with elevated lactic acid.  After he received a liter of normal saline has blood pressure stabilized.  After that 2 units of PRBCs were ordered.         Medical History  Active Ambulatory (Non-Hospital) Problems    Diagnosis     Vitamin D Deficiency     Male Erectile Disorder     Eczema     Lower Back Pain     Insomnia     Past Medical History   Diagnosis Date     Chronic abdominal pain      Insomnia         Surgical History  He  has no past surgical history on file.       Social History  Reviewed, and he  reports that he has never smoked. He has quit using smokeless tobacco. His smokeless tobacco use included Chew. He reports that he drinks about 3.0 - 3.5 oz of alcohol per week  He reports that he uses illicit drugs.       Allergies  No Known Allergies Family History  Reviewed, and family history is not on file.          Prior to Admission Medications   Prescriptions Prior to Admission   Medication Sig Dispense Refill Last Dose     amLODIPine (NORVASC) 10 MG tablet Take 10 mg by mouth daily.   1/9/2017 at am     aspirin 81 MG EC tablet Take 81 mg by mouth daily.   1/9/2017 at am     diclofenac (VOLTAREN) 75 MG EC tablet Take 75 mg by mouth 2 (two) times a day as needed.   1/9/2017 at am     diphenhydrAMINE-acetaminophen (TYLENOL PM EXTRA STRENGTH)  mg Tab Take 1-2 tablets by mouth bedtime as needed.    1/8/2017 at hs     glucosamine sulfate 500 mg cap Take 500 mg by mouth daily.   1/9/2017 at am     magnesium oxide 250 mg Tab Take 250 mg by mouth daily.   1/9/2017 at am     omeprazole (PRILOSEC) 10 MG capsule Take 10 mg by mouth Daily before breakfast.   1/9/2017 at am     traZODone (DESYREL) 150 MG tablet Take 150 mg by mouth bedtime as needed for sleep.   1/8/2017 at hs     zolpidem (AMBIEN) 5 MG tablet Take 5 mg by mouth bedtime as needed for sleep.   1/8/2017 at hs     sildenafil (VIAGRA) 100 MG tablet Take 1 tablet (100 mg total) by mouth daily as needed. 12 tablet 5  Unknown          Review of Systems:  Pertinent items are noted in HPI. Physical Exam:  Temp:  [97.5  F (36.4  C)-98.4  F (36.9  C)] 98.4  F (36.9  C)  Heart Rate:  [101-120] 101  Resp:  [18-35] 35  BP: ()/(42-63) 118/58    General appearance: alert, appears stated age and cooperative  Head: Normocephalic, without obvious abnormality, atraumatic  Lungs: clear to auscultation bilaterally  Heart: regular rate and rhythm, S1, S2 normal, no murmur, click, rub or gallop  Abdomen: distended, not firm. Bowel sounds decreased. Denies tenderness  Extremities: ankle edema  Skin: spider nevi on anterior chest  Neurologic: Grossly normal     Pertinent Labs  Lab Results: personally reviewed.   Lab Results   Component Value Date     (L) 01/09/2017    K 3.9 01/09/2017     01/09/2017    CO2 13 (L) 01/09/2017    BUN 13 01/09/2017    CREATININE 1.49 (H) 01/09/2017    CALCIUM 8.2 (L) 01/09/2017     Lab Results   Component Value Date    WBC 12.3 (H) 01/09/2017    WBC 6.1 06/16/2015    HGB 7.9 (L) 01/09/2017    HCT 22.0 (L) 01/09/2017    MCV 91 01/09/2017     01/09/2017       Pertinent Radiology  Radiology Results: Personally reviewed image/s and Personally reviewed impression/s

## 2021-06-08 NOTE — PROGRESS NOTES
Stable shift levophed weaned to 6mc but BP soft after scheduled 2mg ativan. Levo back up to 8 this am.  Ativan decreased to 1 mg q6h and pt tolerated next dose better levo back down to 6. precedex decreased to 0.8.  RASS-1.  Pt opens eyes to stimulation and will follow command to squeeze hand.  1 episode of pt sitting up and pulling at restraints but pt redirected easily. Fentanyl at 200 mcg.  ZACHARY on right continues high output serosang.fluid.  minimal OG returns urine orange. Sig other and sister updated on pt status and plan of care

## 2021-06-08 NOTE — PROGRESS NOTES
ICU PROGRESS NOTE:    Assessment/Plan:  Babak Wolff is a 61 y.o. 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    NEURO:  Acute encephalopathy, agitated delirium: Multiple factors including possible hepatic encephalopathy, septic encephalopathy.  Ammonia on admission 17.  EtOH level negative on admission, unknown most recent EtOH use.    Recheck ammonia - only 34    Dexmedetomidine as needed    Will try olanzapine at HS    Psychiatry had recommended scheduled gabapentin - will schedule and clamp NG tube if tolerated    Assess for and treat causes of sepsis    Minimize benzos as able.    CVS:  No active issues.    monitor    RESP:  No active issues.    monitor    GI:  Hemoperitoneum, likely advanced liver disease/cirrhosis, alcohol dependence: Patient has had chronic intermittent abdominal pain.  Had minimized alcohol use as outpatient.  Brother and sister confirm that he is a severe alcoholic, after moving back here from [place] he had multiple empty 1.75-L vodka bottles throughout his home, would start drinking in the morning and was often intoxicated by mid-day; his live-in girlfriend has reportedly had to bring vodka bottles to work with her to prevent him from drinking them.  Now with hemoperitoneum, mesenteric varix, coagulopathy with INR 1.5-1.7, tbili 4-5, -120 (now higher likely reactive to acute infection), albumin in 2s.  CT with evidence of diffuse liver disease.    Ammonia as above, schedule lactulose as needed    Have consulted GI today    Dr. Lockett discussed with family that liver transplant is not a consideration in this acute setting    RENAL:  Currently stable kidney function.    monitor    ID:  Hemoperitoneum, fever,  "neutrophilia: High risk of peritonitis, and high risk of fungal peritonitis/fungemia with hemoperitoneum, liver disease and TPN.  On meropenem and vanco but temp up to 39.7 overnight, WBC up to 23.2.    ID consult - appreciated    Continue meropenem, vancomycin and micafungin    Repeat blood cultures (PICC and peripheral) are pending from 1/19    Fungal blood culture--pending from 1/19    HEMATOLOGIC:  Hemoperitoneum, anemia: Acute blood loss and anemia of sepsis/critical illness. Coagulopathy, likely due to cirrhosis, nutritional, consumptive    Monitor with restrictive transfusion threshold    Monitor INR prn    TPN for nutrition    ENDOCRINE:  No active issues.    Monitor    ICU PROPHYLAXIS:    SCDs    PPI    DISPO/CODE STATUS: FULL CODE.  Today (1/20/2017), the patient is critically ill with agitated delirium.    FAMILY COMMUNICATION: No family here yet todau.    Lines/Drains/Tubes:  TERRI PICC, Placed on 1/14/17  OG tube  Saeed catheter  ZACHARY peritoneal drains x 2    Overnight events:  Continuing to spike fevers.    Subjective:  Unable to answer yes/no questions.    Objective:  Physical Exam:  Vent settings for last 24 hours: none       Visit Vitals     /72     Pulse (!) 127     Temp (!) 100.8  F (38.2  C) (Oral)     Resp 27     Ht 6' 3\" (1.905 m)     Wt (!) 227 lb 4.7 oz (103.1 kg)     SpO2 94%     BMI 28.41 kg/m2       Intake/Output last 3 shifts:  I/O last 3 completed shifts:  In: 3711 [I.V.:532; NG/GT:135; IV Piggyback:1450]  Out: 3430 [Urine:1430; Emesis/NG output:300; Drains:1700]  Intake/Output this shift:  I/O this shift:  In: 130 [I.V.:30; IV Piggyback:100]  Out: 250 [Drains:250]    Physical Exam   Constitutional:   Chronically ill appearing gentleman, uncomfortable.   HENT:   Head: Normocephalic.   Eyes: Pupils are equal, round, and reactive to light. Scleral icterus is present.   Neck: Normal range of motion.   Cardiovascular:   Tachycardia, S1 and S2 audible.   Pulmonary/Chest:   Tachypnea, BL BS " audible.   Abdominal:   Distended abdomen, no tenderness, drains noted with significant output. No BS appreciated.   Musculoskeletal: He exhibits edema.   Neurological:   Agitated.   Skin: Skin is warm.     Gen: somnolent, tearful, can say his name but difficulty answering other questions  HEENT: no OP lesions, no TOMMY  CV: RRR, no m/g/r  Resp: CTAB  Abd: soft, nontender, BS+  Neuro: PERRL, nonfocal  Ext: no edema    LAB:    Results from last 7 days  Lab Units 01/20/17  0426   LN-WHITE BLOOD CELL COUNT thou/uL 23.2*   LN-HEMOGLOBIN g/dL 8.4*   LN-HEMATOCRIT % 25.6*   LN-PLATELET COUNT thou/uL 164       Results from last 7 days  Lab Units 01/20/17  0426 01/19/17  0601 01/18/17  0501   LN-SODIUM mmol/L 146* 148*  148* 148*   LN-POTASSIUM mmol/L 3.3* 3.6  3.6 3.6   LN-CHLORIDE mmol/L 114* 117*  117* 117*   LN-CO2 mmol/L 25 24  24 24   LN-BLOOD UREA NITROGEN mg/dL 22 17  17 19   LN-CREATININE mg/dL 0.84 0.79  0.79 0.81   LN-CALCIUM mg/dL 7.9* 8.1*  8.1* 8.2*   LN-PROTEIN TOTAL g/dL 5.2* 5.5* 5.0*   LN-BILIRUBIN TOTAL mg/dL 4.3* 5.1* 4.2*   LN-ALKALINE PHOSPHATASE U/L 65 69 55   LN-ALT (SGPT) U/L 41 37 26   LN-AST (SGOT) U/L 124* 108* 73*         Current Facility-Administered Medications   Medication Dose Route Frequency Provider Last Rate Last Dose     acetaminophen suppository 325 mg (TYLENOL)  325 mg Rectal Q4H PRN Chi Watson MD   325 mg at 01/20/17 0051     albuterol nebulizer solution 2.5 mg (PROVENTIL)  2.5 mg Nebulization Q4H PRN Joy Delgado, CNP         benzocaine-menthol lozenge 1 lozenge (CEPACOL)  1 lozenge Oral Q1H PRN Chi Watson MD         bisacodyl suppository 10 mg (DULCOLAX)  10 mg Rectal Daily PRN Chi Watson MD   10 mg at 01/17/17 1424     dexmedetomidine 400 mcg/100 mL in NS (PRECEDEX) (4mcg/mL)  0.2-1.4 mcg/kg/hr Intravenous Continuous Joyharriet Schwartz CNP   Stopped at 01/19/17 2330     dextrose 10%  0-200 mL/hr Intravenous Continuous PRN Alf Amaya  MD Shiva         dextrose 50 % (D50W) syringe 20-50 mL  20-50 mL Intravenous PRN Chi Watson MD         fat emulsion 20 % infusion 250 mL (INTRALIPID,LIPOSYN)  250 mL Intravenous Once per day on Sun Tue Thu Sat Alf Shannon MD 10.4 mL/hr at 01/19/17 1955 250 mL at 01/19/17 1955     fentaNYL pf injection 25-50 mcg (SUBLIMAZE)  25-50 mcg Intravenous Q1H PRN Alf Shannon MD   50 mcg at 01/19/17 0158     gabapentin 250 mg/5 mL solution 250 mg (NEURONTIN)  250 mg Enteral Tube Q8H FIXED TIMES Johnnie Lockett MD   250 mg at 01/20/17 0457     gabapentin 250 mg/5 mL solution 500 mg (NEURONTIN)  500 mg Enteral Tube BID PRN Sarahruben Cox CNP         glucagon (human recombinant) injection 1 mg  1 mg Subcutaneous PRN Chi Watson MD         heparin (PF) subcutaneous injection 5,000 Units  5,000 Units Subcutaneous Q12H 09-21 Joy Schwartz CNP   5,000 Units at 01/20/17 0820     hydrALAZINE injection 10 mg (APRESOLINE)  10 mg Intravenous Q6H PRN Chi Watson MD   10 mg at 01/19/17 0455     insulin aspart injection (NovoLOG)   Subcutaneous Q6H FIXED TIMES Joy Schwartz CNP   2 Units at 01/20/17 0521     insulin glargine injection 10 Units (LANTUS)  10 Units Subcutaneous BID Joy Schwartz CNP   10 Units at 01/20/17 0819     labetalol 20 mg/4 mL (5 mg/mL) injection 10-20 mg (TRANDATE)  10-20 mg Intravenous Q4H PRN Gela Dahm   10 mg at 01/19/17 0618     LORazepam injection 1 mg (ATIVAN)  1 mg Intravenous TID Sarah STEVE Cox CNP   1 mg at 01/20/17 0820     LORazepam injection 1-2 mg (ATIVAN)  1-2 mg Intravenous Q4H PRN Joy Delgado CNP   2 mg at 01/20/17 0050     magnesium hydroxide suspension 30 mL (MILK OF MAG)  30 mL Oral Daily PRN Chi Watson MD   30 mL at 01/16/17 1949     meropenem 1 g in NaCl 0.9 % 50 mL (MINI-BAG Plus) (MERREM)  1 g Intravenous Q8H Ry Pemberton  mL/hr at 01/20/17 0329 1 g at 01/20/17 0329     micafungin 100 mg in NaCl 0.9 %  100 mL (MINI-BAG Plus) (MYCAMINE)  100 mg Intravenous Q24H Katheryn Valente  mL/hr at 01/19/17 1442 100 mg at 01/19/17 1442     morphine injection 1-2 mg  1-2 mg Intravenous Q4H PRN Joy Schwartz, CNP   2 mg at 01/20/17 0154     naloxone injection 0.04-0.1 mg (NARCAN)  0.04-0.1 mg Intravenous PRN Spencer Mayorga MD        Or     naloxone injection 0.1-0.4 mg (NARCAN)  0.1-0.4 mg Intramuscular PRN Spencer Mayorga MD         nystatin 100,000 unit/mL suspension 500,000 Units (MYCOSTATIN)  5 mL Swish & Swallow QID Gela Dahm   500,000 Units at 01/20/17 0819     OLANZapine tablet 5 mg (zyPREXA)  5 mg Enteral Tube QHS Johnnie Lockett MD   5 mg at 01/19/17 2059     ondansetron injection 4 mg (ZOFRAN)  4 mg Intravenous Q4H PRN Chi Watson MD   4 mg at 01/18/17 1720    Or     ondansetron tablet 8 mg (ZOFRAN)  8 mg Oral Q8H PRN Chi Watson MD         oxyCODONE immediate release tablet 5 mg (ROXICODONE)  5 mg Oral Q4H PRN Chi Watson MD   5 mg at 01/20/17 0457     polyvinyl alcohol 1.4 % ophthalmic solution 1-2 drop (LIQUIFILM TEARS)  1-2 drop Both Eyes Q1H PRN Chi Watson MD   2 drop at 01/16/17 1742     potassium chloride 10 mEq in 50 mL  10 mEq Intravenous Q1H Gela Dahm 50 mL/hr at 01/20/17 1029 10 mEq at 01/20/17 1029     senna-docusate 8.6-50 mg tablet 1 tablet (PERICOLACE)  1 tablet Oral BID Chi Watson MD   1 tablet at 01/18/17 0832    Or     sennosides syrup 8.8 mg (for SENOKOT)  8.8 mg Enteral Tube BID Chi Watson MD   8.8 mg at 01/17/17 1111     sodium chloride 0.45%  25 mL/hr Intravenous Continuous Gela Dahm 25 mL/hr at 01/18/17 0713 25 mL/hr at 01/18/17 0713     sodium chloride 0.9 % flush 10-20 mL (NS)  10-20 mL Intravenous PRN Alf Shannon MD   10 mL at 01/17/17 0425     sodium chloride 0.9 % flush 10-30 mL (NS)  10-30 mL Intravenous PRN Alf Shannon MD         sodium chloride 0.9 % flush 10-30 mL (NS)  10-30 mL  Intravenous Q8H FIXED TIMES Alf Shannon MD   10 mL at 01/20/17 0511     sodium chloride 0.9 % flush 20 mL (NS)  20 mL Intravenous PRN lAf Shannon MD         sodium chloride 0.9 % flush 3 mL (NS)  3 mL Intravenous PRN Spencer Mayorga MD   10 mL at 01/11/17 1417     sodium phosphates 133 mL rectal enema 1 enema (for FLEET)  1 enema Rectal Daily PRN Gela Pinon   1 enema at 01/16/17 2115     TPN - Custom formula   Intravenous TPN - see admin instructions Johnnie Lockett MD 75 mL/hr at 01/19/17 2200       vancomycin 1.5 g in dextrose 5% 500 mL (VANCOCIN)  1.5 g Intravenous Q8H Joy Schwartz CNP           ICU DAILY CHECKLIST                           Can patient transfer out of MICU? no    FAST HUG:    Feeding:  Feeding: TPN, OG to LIS.  Saeed:Yes  Analgesia/Sedation:Yes dexmedetomidine  Thromboembolic prophylaxis: yes; Mode:  SCDs  HOB>30:  Yes  Stress Ulcer Protocol Active: yes; Mode: H2 Antagonist  Glycemic Control: Any glucose > 180 yes; Mode of Insulin Therapy: Sliding Scale Insulin    INTUBATED:  Can patient have daily waking:  not applicable  Can patient have spontaneous breathing trial:  not applicable    Restraints? no    PHYSICAL THERAPY AND MOBILITY:  Can patient have PT and mobility trial: no  Activity: Bed Rest    Total Critical Care Time : 45 Minutes      Leigh Seguravi  Pulmonary and Critical Care  1891

## 2021-06-08 NOTE — ANESTHESIA PREPROCEDURE EVALUATION
Anesthesia Evaluation      Patient summary reviewed   No history of anesthetic complications     Airway   Mallampati: III  Neck ROM: full   Pulmonary - negative ROS                          Cardiovascular   (+) hypertension, ,     ECG reviewed        Neuro/Psych - negative ROS     Endo/Other       Comments: Hyponatremia - Na 126  Hgb 6.9    GI/Hepatic/Renal    (+)   chronic renal disease ARF,      Other findings: Principal Problem:  Shock circulatory  Active Problems:  Hemorrhage, peritoneal  Lactic acidosis  Probable liver cirrhosis due to alcohol use  Acute blood loss anemia  Coagulopathy  MODESTA  RLL nodule, 5mm      Dental    (+) caps                       Anesthesia Plan  Planned anesthetic: general endotracheal  Glidescope intubation  ASA 4 - emergent   Induction: intravenous   Anesthetic plan and risks discussed with: patient and spouse  Anesthesia plan special considerations: video-assisted, rapid sequence induction, CVP line, arterial catheterization, IV therapy two IVs,   Post-op plan: extended intubation/vent support

## 2021-06-08 NOTE — PROGRESS NOTES
Infectious Disease Follow up Note:    Service: Infectious Disease   Note: Follow Up Note  Date: 2017    Chief Complaint: Follow up for fever and leukocytosis.     ASSESSMENT:  Babak Wolff is a 61 y.o. 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         Principal Problem:  Shock circulatory  Active Problems:  Hemorrhage, peritoneal  Acute blood loss anemia  Lactic acidosis  Alcohol withdrawal, uncomplicated  Respiratory failure, post-operative  Encephalopathy  Cognitive disorder  Compulsive behaviors     No clear new source of infection at this time. At high risk for fungal infection given TPN, PICC and ESLD. Repeat CT recently not concerning for new infection. Fever and WBC improving on Meropenem + Vancomycin + Micafungin. Mental status better today.       Recommendations:  Continue Meropenem + Vancomycin + Micafungin.  Follow pending cultures and adjust therapy with cultures.     MARIUSZ Valente MD  [facility]   [phone] Option-2    ______________________________________________________________________  Notes / labs / vitals reviewed.    SUBJECTIVE / INTERVAL HISTORY:    ROS: A complete 12 point ROS is negative except as listed above.    PMHx/FHx/SHx: Reviewed. Interval changes noted.    OBJECTIVE:  Vitals:    17 1100   BP: 145/67   Pulse: (!) 125   Resp: (!) 29   Temp: 98.5  F (36.9  C)   SpO2: 93%       Temp (24hrs), Av.2  F (37.9  C), Min:98.4  F (36.9  C), Max:102.1  F (38.9  C)  Gen: awake and conversing appropriately today.   HEENMT:PERRLA, extra ocular movement intact and icteric sclera  NEURO: moves all extremities; wakes to name.   CARDIOVASCULAR: S1, S2 tachy and regular, no murmur, rub or gallop  PULMONARY: Tachypneic; chest clear, no wheezing, rales, normal symmetric air entry; no retractions or cyanosis  GASTROINTESTINAL: midline incision clean and intact  with staples; JPs x 2 with yellow fluid; active bowel sounds.   MSK: All joints are free of effusion and tenderness.   INTEGUMENT: jaundice  PSYCH: more alert today; answering questions     Antibiotics:  Meropenem  Vanco  Micafungin     Pertinent labs:      Results from last 7 days  Lab Units 01/20/17  0426 01/19/17  0601 01/18/17  0501 01/17/17  0442   LN-WHITE BLOOD CELL COUNT thou/uL 23.2* 29.3* 14.7* 9.5   LN-HEMOGLOBIN g/dL 8.4* 9.2* 8.5* 7.4*   LN-HEMATOCRIT % 25.6* 28.0* 25.7* 22.6*   LN-PLATELET COUNT thou/uL 164 207 149 116*   LN-NEUTROPHILS RELATIVE PERCENT %  --  80* 71* 74*   LN-MONOCYTES RELATIVE PERCENT %  --  9 12* 13*   LN-MONOCYTES - REL (DIFF) % 9  --   --   --          Results from last 7 days  Lab Units 01/20/17  0426 01/19/17  0601 01/18/17  0501   LN-SODIUM mmol/L 146* 148*  148* 148*   LN-POTASSIUM mmol/L 3.3* 3.6  3.6 3.6   LN-CHLORIDE mmol/L 114* 117*  117* 117*   LN-CO2 mmol/L 25 24  24 24   LN-BLOOD UREA NITROGEN mg/dL 22 17  17 19   LN-CREATININE mg/dL 0.84 0.79  0.79 0.81   LN-CALCIUM mg/dL 7.9* 8.1*  8.1* 8.2*   LN-ALBUMIN g/dL 2.4* 2.7* 2.7*   LN-PROTEIN TOTAL g/dL 5.2* 5.5* 5.0*   LN-BILIRUBIN TOTAL mg/dL 4.3* 5.1* 4.2*   LN-ALKALINE PHOSPHATASE U/L 65 69 55   LN-ALT (SGPT) U/L 41 37 26   LN-AST (SGOT) U/L 124* 108* 73*       MICROBIOLOGY DATA:    Cultures reviewed      IMAGING/RADIOLOGY:  Reviewed

## 2021-06-08 NOTE — CONSULTS
INITIAL PSYCHIATRIC CONSULTATION  This note was created with the help of Dragon dictation system. Grammatical and typing errors are not intentional.                REASON FOR REQUEST: Medication meangement    ASSESSMENT/RECOMMENDATIONS/PLAN : 61 year old with fluctuating levels of consciousness, confusion, disorientation increasing since admission to hospital suggestive of encephalopathy in the context of his medical condition. Fluctuating cognition is complicated by episodic restlessness, behavioral agitation and difficultly following commands. Further limitations: patient is NPO with a prolonged QTc(QTc 500) use caution when using antipsychotics or other QTC prolonging medications.   Caution with medications affecting liver.      Encephalopathy, multifactorial : hyper-hypoactive delirium with behavioral restlessness and agitation.    Cognitive disorder due to #1    Alcohol abuse disorder severe NOS    Behavioral impulsive and restlessness due to #1.  Recommendations:  Use Ativan and benadryl for behavioral management.   Consider following when able to:   Neurontin 250 mg Q8H. And 500 mg BID PRN   Ambien 5 mg QHS   Discussed care with ICU care team.    MENTAL STATUS EXAMINATION:   Appearance: Stated age, Laying in bed.  Speech: None  Thought Process: Not assessable due to somnolence, not arousable.  Thought content: Does not show hallucinations, delusions, paranoia, no restlessness.  Thought Formation: No loosening of associations, no flight of ideas.  Judgment: Impaired.  Insight :Impaired.  Attention : Somnolent  Memory: Depressed  Fund Of Knowledge: Depressed  Affect: Flat  Mood: Somnolent  Alert and Oriented: x 0  Comprehension: Depressed somnolence  Generative thought content: None  Language: Difficult to assess, not engaging in any conversation. As noted in admission note he was conversing.  Gait and Ambulation: Not tested.   on 1/9 ; 500 on 1/15.  AST 87, ALT 32 BUN 14, Sodium 144, Potassium  3.5    HISTORY OF PRESENT ILLNESS:   Presenting history to include: Per HMS/Specialists:   61 y.o. male with hx of chronic abdominal pain and insomnia who presents to the Emergency Department via EMS with his wife for evaluation of abdominal pain and syncope. Pt reports he had the onset of suprapubic abdominal pain 3 months ago. He has been following up with his PCP and had u/s and xray of the abdomen with no significant findings. Pt notes he has restless sleep most nights. Last night pt had an episode of watery diarrhea. He states he went to bed feeling fine. This morning pt reports he woke up with the onset of tachypnea and dizziness. When he stood up pt had an episode of syncope. His wife called EMS. Upon arrival, pt rates his abdominal pain as 6/10. He notes the tachypnea has resolved; however, he continues to feel dizzy. Pt denies nausea.   Upon ER workup, patient was found to have fluid inside the . CTA was done and found to have hemoperitoneum. Patient received 2 unit of PRBC from emergency room. Patient hemodynamically improved. Discussed with surgeon Julia STALLINGS to look for the source of bleeding. And patient will be admitted to ICU for further investigation and management.    He has been delirious and not following directions.  Staff reported on patient being NPO, on precedex, episodic agitation and restlessness.  History is limited due to above. Information obtained from [facility]: hx of alcohol abuse noted.   Per nursing this morning: Around 6 AM  Pt has continues to be restless/ agitated on and off requiring 50mcg of fentanyl every 2hours. Haldol given prn per orders. Precedex gtt remains at 1.4mcg. Pt has been turned repositioned and boosted up in bed with a sling lift. Had at least 1800ml from furosmide. K 3.3 being replaced per protocol. Mg 1.8 and hgb 7.7. Pt has required ZACHARY site dressing changes x3 on this shift due to being supersaturated with serosangnious drainage. Pt has less crackles and  "02 remains at 4L/NC.  Norbert Smyth RN   6:49 AM   1/16/2017  Around 3AM:  Pt has been very agitated on and off attempting to climb OOB and sit up in bed with lines stretched. Pt mumbling incomprehensible sounds. Does not respond to redirections and has required repositioning in bed every 15mins. Pt with some scattered wheezes and coarse crackles posteriorly. Pt desating 88-89% 02 increased to 4L/NC and sats are 99-98% now. Pt has had 200ml of dark colored felisa urine from 00-02. CNP notified and see new orders for furosmide and nebs.  A: At 0013 and 0238 received fentanyl. 0018 haldol given 0134 ativan given and 0207 furosmide 20mg given. At 0300 had 800ml of urine output from 6756-1846. Pt also received a stat neb at 0134.    Psychiatric ROS:  Patient is somnolent, not engaging in conversation not provide any information.      Visit Vitals     BP 93/51     Pulse 81     Temp 98.8  F (37.1  C)     Resp (!) 29     Ht 6' 3\" (1.905 m)     Wt (!) 256 lb 13.4 oz (116.5 kg)     SpO2 93%     BMI 32.1 kg/m2     AMPHETAMINES (no units)   Date Value   01/09/2017 Screen Negative     PHENCYCLIDINE (no units)   Date Value   01/09/2017 Screen Negative     BARBITURATES (no units)   Date Value   01/09/2017 Screen Negative     COCAINE METABOLITE (no units)   Date Value   01/09/2017 Screen Negative     OXYCODONE (no units)   Date Value   01/09/2017 Screen Negative     CREATININE, URINE (mg/dL)   Date Value   01/09/2017 144.3     THC (no units)   Date Value   01/09/2017 Screen Negative     ALCOHOL, BLOOD (mg/dL)   Date Value   01/09/2017 <10     Recent Results (from the past 24 hour(s))   POCT Glucose   Result Value Ref Range    Glucose,  mg/dL   Potassium   Result Value Ref Range    Potassium 3.6 3.5 - 5.0 mmol/L   POCT Glucose   Result Value Ref Range    Glucose,  mg/dL   Hemoglobin   Result Value Ref Range    Hemoglobin 7.3 (L) 14.0 - 18.0 g/dL   POCT Glucose   Result Value Ref Range    Glucose,  mg/dL "   POCT Glucose   Result Value Ref Range    Glucose,  mg/dL   Comprehensive Metabolic Panel   Result Value Ref Range    Sodium 144 136 - 145 mmol/L    Potassium 3.3 (L) 3.5 - 5.0 mmol/L    Chloride 115 (H) 98 - 107 mmol/L    CO2 22 22 - 31 mmol/L    Anion Gap, Calculation 7 5 - 18 mmol/L    Glucose 165 (H) 70 - 125 mg/dL    BUN 14 8 - 22 mg/dL    Creatinine 0.72 0.70 - 1.30 mg/dL    GFR MDRD Af Amer >60 >60 mL/min/1.73m2    GFR MDRD Non Af Amer >60 >60 mL/min/1.73m2    Bilirubin, Total 4.3 (H) 0.0 - 1.0 mg/dL    Calcium 7.7 (L) 8.5 - 10.5 mg/dL    Protein, Total 4.7 (L) 6.0 - 8.0 g/dL    Albumin 2.4 (L) 3.5 - 5.0 g/dL    Alkaline Phosphatase 59 45 - 120 U/L    AST 87 (H) 0 - 40 U/L    ALT 32 0 - 45 U/L   HM2(CBC w/o Differential)   Result Value Ref Range    WBC 8.3 4.0 - 11.0 thou/uL    RBC 2.56 (L) 4.40 - 6.20 mill/uL    Hemoglobin 7.7 (L) 14.0 - 18.0 g/dL    Hematocrit 23.4 (L) 40.0 - 54.0 %    MCV 91 80 - 100 fL    MCH 30.2 27.0 - 34.0 pg    MCHC 33.0 32.0 - 36.0 g/dL    RDW 16.8 (H) 11.0 - 14.5 %    Platelets 123 (L) 140 - 440 thou/uL    MPV 7.6 7.0 - 10.0 fL   INR   Result Value Ref Range    INR 1.63 (H) 0.90 - 1.10   Magnesium   Result Value Ref Range    Magnesium 1.8 1.8 - 2.6 mg/dL   POCT Glucose   Result Value Ref Range    Glucose,  mg/dL   POCT Glucose   Result Value Ref Range    Glucose,  mg/dL   Potassium   Result Value Ref Range    Potassium 3.5 3.5 - 5.0 mmol/L     Recent Results (from the past 72 hour(s))   POCT Glucose    Collection Time: 01/13/17 10:54 AM   Result Value Ref Range    Glucose,  mg/dL   POCT Glucose    Collection Time: 01/13/17 12:02 PM   Result Value Ref Range    Glucose,  mg/dL   Hemoglobin    Collection Time: 01/13/17  2:14 PM   Result Value Ref Range    Hemoglobin 7.9 (L) 14.0 - 18.0 g/dL   Glycosylated Hemoglobin A1C    Collection Time: 01/13/17  2:14 PM   Result Value Ref Range    Hemoglobin A1c 5.0 4.2 - 6.1 %   POCT Glucose    Collection Time:  01/13/17  3:43 PM   Result Value Ref Range    Glucose,  mg/dL   POCT Glucose    Collection Time: 01/13/17  7:30 PM   Result Value Ref Range    Glucose,  mg/dL   POCT Glucose    Collection Time: 01/13/17 11:54 PM   Result Value Ref Range    Glucose,  mg/dL   POCT Glucose    Collection Time: 01/14/17  4:01 AM   Result Value Ref Range    Glucose,  mg/dL   HM2(CBC w/o Differential)    Collection Time: 01/14/17  5:44 AM   Result Value Ref Range    WBC 11.3 (H) 4.0 - 11.0 thou/uL    RBC 2.55 (L) 4.40 - 6.20 mill/uL    Hemoglobin 7.9 (L) 14.0 - 18.0 g/dL    Hematocrit 23.0 (L) 40.0 - 54.0 %    MCV 90 80 - 100 fL    MCH 30.9 27.0 - 34.0 pg    MCHC 34.2 32.0 - 36.0 g/dL    RDW 15.8 (H) 11.0 - 14.5 %    Platelets 138 (L) 140 - 440 thou/uL    MPV 6.8 (L) 7.0 - 10.0 fL   Comprehensive Metabolic Panel    Collection Time: 01/14/17  5:44 AM   Result Value Ref Range    Sodium 138 136 - 145 mmol/L    Potassium 3.7 3.5 - 5.0 mmol/L    Chloride 112 (H) 98 - 107 mmol/L    CO2 21 (L) 22 - 31 mmol/L    Anion Gap, Calculation 5 5 - 18 mmol/L    Glucose 136 (H) 70 - 125 mg/dL    BUN 20 8 - 22 mg/dL    Creatinine 0.68 (L) 0.70 - 1.30 mg/dL    GFR MDRD Af Amer >60 >60 mL/min/1.73m2    GFR MDRD Non Af Amer >60 >60 mL/min/1.73m2    Bilirubin, Total 5.1 (H) 0.0 - 1.0 mg/dL    Calcium 7.3 (L) 8.5 - 10.5 mg/dL    Protein, Total 4.5 (L) 6.0 - 8.0 g/dL    Albumin 2.5 (L) 3.5 - 5.0 g/dL    Alkaline Phosphatase 52 45 - 120 U/L     (H) 0 - 40 U/L    ALT 37 0 - 45 U/L   Magnesium    Collection Time: 01/14/17  5:44 AM   Result Value Ref Range    Magnesium 2.0 1.8 - 2.6 mg/dL   POCT Glucose    Collection Time: 01/14/17  7:47 AM   Result Value Ref Range    Glucose,  mg/dL   Blood Gases, Arterial    Collection Time: 01/14/17  7:53 AM   Result Value Ref Range    pH, Arterial 7.44 7.37 - 7.44    pCO2, Arterial 35 35 - 45 mm Hg    pO2, Arterial 76 (L) 80 - 90 mm Hg    Bicarbonate, Arterial Calc 24.8 23.0 - 29.0 mmol/L     O2 Sat, Arterial 99.0 (H) 96.0 - 97.0 %    Oxyhemoglobin 96.3 96.0 - 97.0 %    Base Excess, Arterial Calc -0.2 mmol/L    Ventilation Mode VCV     Rate 14 rr/min    FIO2 30.00     Peep 5 cm H2O    Sample Stabilized Temperature 37.0 degrees C    Ventilator Tidal Volume 550 mL   ECG 12 lead with MUSE    Collection Time: 01/14/17  9:23 AM   Result Value Ref Range    SYSTOLIC BLOOD PRESSURE  mmHg    DIASTOLIC BLOOD PRESSURE  mmHg    VENTRICULAR RATE 71 BPM    ATRIAL RATE 71 BPM    P-R INTERVAL 160 ms    QRS DURATION 102 ms    Q-T INTERVAL 458 ms    QTC CALCULATION (BEZET) 497 ms    P Axis 23 degrees    R AXIS -19 degrees    T AXIS -11 degrees    MUSE DIAGNOSIS       Normal sinus rhythm  Prolonged QT  Abnormal ECG  When compared with ECG of 09-JAN-2017 08:06,  Vent. rate has decreased BY  43 BPM  T wave amplitude has decreased in Anterior leads  Confirmed by EVERARDO REYES MD LOC:JN ([id number]) on 1/14/2017 6:11:22 PM     TPN Panel    Collection Time: 01/14/17 11:22 AM   Result Value Ref Range    Sodium 138 136 - 145 mmol/L    Potassium 3.9 3.5 - 5.0 mmol/L    Chloride 113 (H) 98 - 107 mmol/L    CO2 21 (L) 22 - 31 mmol/L    Glucose 123 70 - 125 mg/dL    BUN 19 8 - 22 mg/dL    Creatinine 0.63 (L) 0.70 - 1.30 mg/dL    GFR MDRD Af Amer >60 >60 mL/min/1.73m2    GFR MDRD Non Af Amer >60 >60 mL/min/1.73m2    Calcium 7.1 (L) 8.5 - 10.5 mg/dL    Bilirubin, Total 5.0 (H) 0.0 - 1.0 mg/dL    Phosphorus 2.7 2.5 - 4.5 mg/dL    Protein, Total 4.4 (L) 6.0 - 8.0 g/dL    Alkaline Phosphatase 49 45 - 120 U/L     (H) 0 - 40 U/L    Magnesium 1.9 1.8 - 2.6 mg/dL    Triglycerides 134 <=149 mg/dL    Prealbumin 8.0 (L) 19.0 - 38.0 mg/dL    Albumin 2.6 (L) 3.5 - 5.0 g/dL   INR    Collection Time: 01/14/17 11:22 AM   Result Value Ref Range    INR 1.48 (H) 0.90 - 1.10   POCT Glucose    Collection Time: 01/14/17 12:28 PM   Result Value Ref Range    Glucose,  mg/dL   POCT Glucose    Collection Time: 01/14/17  3:57 PM   Result Value Ref  Range    Glucose, POC 96 mg/dL   HM2(CBC w/o Differential)    Collection Time: 01/14/17  6:10 PM   Result Value Ref Range    WBC 8.6 4.0 - 11.0 thou/uL    RBC 2.45 (L) 4.40 - 6.20 mill/uL    Hemoglobin 7.4 (L) 14.0 - 18.0 g/dL    Hematocrit 22.1 (L) 40.0 - 54.0 %    MCV 90 80 - 100 fL    MCH 30.3 27.0 - 34.0 pg    MCHC 33.6 32.0 - 36.0 g/dL    RDW 16.3 (H) 11.0 - 14.5 %    Platelets 121 (L) 140 - 440 thou/uL    MPV 6.9 (L) 7.0 - 10.0 fL   POCT Glucose    Collection Time: 01/14/17  7:32 PM   Result Value Ref Range    Glucose,  mg/dL   POCT Glucose    Collection Time: 01/14/17 11:54 PM   Result Value Ref Range    Glucose,  mg/dL   Comprehensive Metabolic Panel    Collection Time: 01/15/17  4:19 AM   Result Value Ref Range    Sodium 138 136 - 145 mmol/L    Potassium 3.5 3.5 - 5.0 mmol/L    Chloride 112 (H) 98 - 107 mmol/L    CO2 23 22 - 31 mmol/L    Anion Gap, Calculation 3 (L) 5 - 18 mmol/L    Glucose 147 (H) 70 - 125 mg/dL    BUN 17 8 - 22 mg/dL    Creatinine 0.66 (L) 0.70 - 1.30 mg/dL    GFR MDRD Af Amer >60 >60 mL/min/1.73m2    GFR MDRD Non Af Amer >60 >60 mL/min/1.73m2    Bilirubin, Total 4.1 (H) 0.0 - 1.0 mg/dL    Calcium 7.3 (L) 8.5 - 10.5 mg/dL    Protein, Total 4.5 (L) 6.0 - 8.0 g/dL    Albumin 2.5 (L) 3.5 - 5.0 g/dL    Alkaline Phosphatase 51 45 - 120 U/L    AST 83 (H) 0 - 40 U/L    ALT 32 0 - 45 U/L   HM2(CBC w/o Differential)    Collection Time: 01/15/17  4:19 AM   Result Value Ref Range    WBC 7.5 4.0 - 11.0 thou/uL    RBC 2.33 (L) 4.40 - 6.20 mill/uL    Hemoglobin 7.3 (L) 14.0 - 18.0 g/dL    Hematocrit 21.2 (L) 40.0 - 54.0 %    MCV 91 80 - 100 fL    MCH 31.2 27.0 - 34.0 pg    MCHC 34.2 32.0 - 36.0 g/dL    RDW 16.2 (H) 11.0 - 14.5 %    Platelets 110 (L) 140 - 440 thou/uL    MPV 7.3 7.0 - 10.0 fL   Hyperal Lytes    Collection Time: 01/15/17  4:19 AM   Result Value Ref Range    BUN 17 8 - 22 mg/dL    Calcium 7.3 (L) 8.5 - 10.5 mg/dL    Chloride 112 (H) 98 - 107 mmol/L    CO2 23 22 - 31 mmol/L     Creatinine 0.66 (L) 0.70 - 1.30 mg/dL    GFR MDRD Af Amer >60 >60 mL/min/1.73m2    GFR MDRD Non Af Amer >60 >60 mL/min/1.73m2    Glucose 147 (H) 70 - 125 mg/dL    Potassium 3.5 3.5 - 5.0 mmol/L    Magnesium 2.0 1.8 - 2.6 mg/dL    Sodium 138 136 - 145 mmol/L    Phosphorus 2.7 2.5 - 4.5 mg/dL   POCT Glucose    Collection Time: 01/15/17  4:19 AM   Result Value Ref Range    Glucose,  mg/dL   ECG 12 lead with MUSE    Collection Time: 01/15/17  6:10 AM   Result Value Ref Range    SYSTOLIC BLOOD PRESSURE  mmHg    DIASTOLIC BLOOD PRESSURE  mmHg    VENTRICULAR RATE 57 BPM    ATRIAL RATE 57 BPM    P-R INTERVAL 152 ms    QRS DURATION 98 ms    Q-T INTERVAL 514 ms    QTC CALCULATION (BEZET) 500 ms    P Axis 23 degrees    R AXIS -15 degrees    T AXIS -14 degrees    MUSE DIAGNOSIS       Sinus bradycardia  T wave abnormality consider inferior ischemia  Prolonged QT  Abnormal ECG  When compared with ECG of 14-JAN-2017 09:23,  Heart rate is slower.Inferior T wave abnormality is more prominent  Confirmed by EVERARDO REYES MD LOC:JN ([id number]) on 1/15/2017 6:02:16 PM     POCT Glucose    Collection Time: 01/15/17  7:37 AM   Result Value Ref Range    Glucose,  mg/dL   POCT Glucose    Collection Time: 01/15/17 11:32 AM   Result Value Ref Range    Glucose,  mg/dL   Potassium    Collection Time: 01/15/17 11:42 AM   Result Value Ref Range    Potassium 3.6 3.5 - 5.0 mmol/L   POCT Glucose    Collection Time: 01/15/17  4:14 PM   Result Value Ref Range    Glucose,  mg/dL   Hemoglobin    Collection Time: 01/15/17  6:40 PM   Result Value Ref Range    Hemoglobin 7.3 (L) 14.0 - 18.0 g/dL   POCT Glucose    Collection Time: 01/15/17  8:01 PM   Result Value Ref Range    Glucose,  mg/dL   POCT Glucose    Collection Time: 01/15/17 11:49 PM   Result Value Ref Range    Glucose,  mg/dL   Comprehensive Metabolic Panel    Collection Time: 01/16/17  4:08 AM   Result Value Ref Range    Sodium 144 136 - 145 mmol/L     Potassium 3.3 (L) 3.5 - 5.0 mmol/L    Chloride 115 (H) 98 - 107 mmol/L    CO2 22 22 - 31 mmol/L    Anion Gap, Calculation 7 5 - 18 mmol/L    Glucose 165 (H) 70 - 125 mg/dL    BUN 14 8 - 22 mg/dL    Creatinine 0.72 0.70 - 1.30 mg/dL    GFR MDRD Af Amer >60 >60 mL/min/1.73m2    GFR MDRD Non Af Amer >60 >60 mL/min/1.73m2    Bilirubin, Total 4.3 (H) 0.0 - 1.0 mg/dL    Calcium 7.7 (L) 8.5 - 10.5 mg/dL    Protein, Total 4.7 (L) 6.0 - 8.0 g/dL    Albumin 2.4 (L) 3.5 - 5.0 g/dL    Alkaline Phosphatase 59 45 - 120 U/L    AST 87 (H) 0 - 40 U/L    ALT 32 0 - 45 U/L   HM2(CBC w/o Differential)    Collection Time: 01/16/17  4:08 AM   Result Value Ref Range    WBC 8.3 4.0 - 11.0 thou/uL    RBC 2.56 (L) 4.40 - 6.20 mill/uL    Hemoglobin 7.7 (L) 14.0 - 18.0 g/dL    Hematocrit 23.4 (L) 40.0 - 54.0 %    MCV 91 80 - 100 fL    MCH 30.2 27.0 - 34.0 pg    MCHC 33.0 32.0 - 36.0 g/dL    RDW 16.8 (H) 11.0 - 14.5 %    Platelets 123 (L) 140 - 440 thou/uL    MPV 7.6 7.0 - 10.0 fL   INR    Collection Time: 01/16/17  4:08 AM   Result Value Ref Range    INR 1.63 (H) 0.90 - 1.10   Magnesium    Collection Time: 01/16/17  4:08 AM   Result Value Ref Range    Magnesium 1.8 1.8 - 2.6 mg/dL   POCT Glucose    Collection Time: 01/16/17  4:10 AM   Result Value Ref Range    Glucose,  mg/dL   POCT Glucose    Collection Time: 01/16/17  7:34 AM   Result Value Ref Range    Glucose,  mg/dL   Potassium    Collection Time: 01/16/17  7:51 AM   Result Value Ref Range    Potassium 3.5 3.5 - 5.0 mmol/L       PMH:   Past Medical History   Diagnosis Date     Chronic abdominal pain      Insomnia            Current Medications:Scheduled Meds:    chlorhexidine  15 mL Topical Q12H     ertapenem (INVanz) IV  1 g Intravenous Q24H     fat emulsion  250 mL Intravenous Once per day on Sun Tue Thu Sat     folic acid  1 mg Oral DAILY    Or     folic acid (FOLVITE) IVPB  1 mg Intravenous DAILY     insulin aspart (NovoLOG) injection   Subcutaneous Q6H FIXED TIMES      insulin glargine  10 Units Subcutaneous BID     ipratropium-albuterol         pantoprazole  40 mg Intravenous Q24H     potassium chloride  10 mEq Intravenous Q1H     senna-docusate  1 tablet Oral BID    Or     senna (SENOKOT) syrup  8.8 mg Enteral Tube BID     sodium chloride  10-30 mL Intravenous Q8H FIXED TIMES     thiamine  100 mg Oral DAILY    Or     thiamine (vitamin B1) IVPB  100 mg Intravenous DAILY     Continuous Infusions:    dexmedetomidine 400 mcg/100 mL in NS (PRECEDEX) (4mcg/mL) 1.4 mcg/kg/hr (01/16/17 0852)     dextrose 10%       nacl 0.9% 10 mL/hr (01/16/17 0810)     TPN - Custom formula 75 mL/hr at 01/16/17 0810     TPN - Custom formula       PRN Meds:.acetaminophen, albuterol **AND** Nebulizer treatment intermittent, benzocaine-menthol, bisacodyl, dextrose 10%, dextrose 50 % (D50W), diphenhydrAMINE, fentaNYL pf, glucagon (human recombinant), haloperidol lactate, hydrALAZINE, LORazepam, magnesium hydroxide, naloxone **OR** naloxone, ondansetron **OR** ondansetron, oxyCODONE, polyvinyl alcohol, sodium chloride, sodium chloride, sodium chloride, Insert peripheral IV **AND** Saline lock IV **AND** sodium chloride                Family History: PERSONALLY REVIEWED.History reviewed. No pertinent family history.  Pertinent Family hx not pertinent to Chief Complaint or reason for visit.     Social History:  PERSONALLY REVIEWED.  Social History     Social History     Marital status:      Spouse name: N/A     Number of children: N/A     Years of education: N/A     Occupational History     Not on file.     Social History Main Topics     Smoking status: Never Smoker     Smokeless tobacco: Former User     Types: Chew     Alcohol use 3.0 - 3.5 oz/week     6 - 7 drink(s) per week     Drug use: Yes      Comment: occasional marijuana     Sexual activity: Yes     Partners: Female     Other Topics Concern     Not on file     Social History Narrative             Allergies as of 06/01/2014 Reviewed     Review of  Systems:As per HPI. Remainders of 12 point review of systems negative.    Review of Pertinent Laboratory:      PERSONALLY REVIEWED.    Physical Exam: Temp:  [98.2  F (36.8  C)-102.1  F (38.9  C)] 98.8  F (37.1  C)  Heart Rate:  [] 81  Resp:  [20-43] 29  BP: ()/(50-74) 93/51  Arterial Line BP: (108)/(42) 108/42   Vitals: reviewed in chart     Physical exam as per medical team: reviewed in chart      diagnoses, risk and benefits of medications discussed with the team to include the requesting provider, pharmacist and nursing staff. Care coordination with care management team.   Thank you for this consultation.       Sarah Cox; NP  [facility]        This note was created with the help of Dragon dictation system. Grammatical and typing errors are not intentional.

## 2021-06-08 NOTE — PROGRESS NOTES
Transported and monitored patient for paracentesis. Patient alert and oriented x4; understands procedure. Monitored vitals throughout. Respirations from 40 to 27 after procedure; O2 sats remained stable between 90-94% on 4L.  /55 during procedure. Heart rhythm sinus tachycardia at 105. Ramona Avila RN

## 2021-06-08 NOTE — PROGRESS NOTES
Chart check discussed with Intensivist who is managing.   60 YO gentleman with alcoholic liver cirrhosis with continued alcohol abuse presenting with syncope. Found to have hemoperitoneum with tense abdomen. Visceral angiogram showed no evidence of extravasation from the splenic, right hepatic and left hepatic artery. Received 4 units of pRBCs thus far. Gen surgery following and taken to surgery this morning. . [facility] [place] the patient peripherally.

## 2021-06-08 NOTE — PROGRESS NOTES
Clinical Nutrition Therapy Assessment Note      Reason for Assessment:   Babak Wolff is a 61 y.o. male assessed by the registered Dietitian for consult and nutrition support. For tube feeding-start (to trickle in)    Current Nutrition Intake:  TPN continues (D20 AA8 @ 75 cc/hr and 250 ml of 20% lipids 4 times per week)    Intervention:  Will start with peptamen AF @ 10 cc/hr to provide: 288 Calories, 18 grams of protein and 26 grams of CHO/day.  When able (Per MD) would recommend an eventual goal rate of 75 cc/hr to provide: 2160 Calories, 136 grams of protein and 193 grams of CHO.    Monitoring:  TF tolerance, TPN regimen, labs, weight, plan of care    See Care Plan for Problems, Goals, and Interventions.

## 2021-06-08 NOTE — PROGRESS NOTES
O: Pt will have decreased agitation    D:  Pt has been very agitated on and off attempting to climb OOB and sit up in bed with lines stretched.  Pt mumbling incomprehensible sounds.  Does not respond to redirections and has required repositioning in bed every 15mins.  Pt with some scattered wheezes and coarse crackles posteriorly. Pt desating 88-89% 02 increased to 4L/NC and sats are 99-98% now. Pt has had 200ml of dark colored felisa urine from 00-02.  CNP notified and see new orders for furosmide and nebs.  A: At 0013 and 0238 received fentanyl.  0018 haldol given 0134 ativan given and 0207 furosmide 20mg given. At 0300 had 800ml of urine output from 5543-4441.  Pt also received a stat neb at 0134.  R: Will continue to monitor.  Pt less wheezy now.  Norbert Smyth RN   1/16/2017   3:21 AM

## 2021-06-08 NOTE — PROGRESS NOTES
"Visit Vitals     /47 (Patient Position: Lying)     Pulse 62     Temp 98.4  F (36.9  C) (Oral)     Resp 15     Ht 6' 3\" (1.905 m)     Wt (!) 256 lb 6.3 oz (116.3 kg)     SpO2 93%     BMI 32.05 kg/m2     Vent Mode: VCV  FiO2 (%):  [25 %-35 %] 35 %  S RR:  [14] 14  S VT:  [550 mL] 550 mL  PEEP/CPAP (cm H2O):  [5 cm H2O] 5 cm H2O  Minute Ventilation (L/min):  [7.5 L/min-10.8 L/min] 7.9 L/min  PIP:  [10 cm H2O-42 cm H2O] 28 cm H2O  WA SUP:  [5 cm H20] 5 cm H20  MAP (cm H2O):  [6-11] 9     PT OETT clean and secure. Ambu present.  OETT rotated with each vent check.  Scant suctioned this shift. FiO2 weaned as tolerated.  Will continue to monitor.  Breath sounds clear decreased.   "
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
## 2021-06-08 NOTE — PROGRESS NOTES
"Spiritual Care Narrative Note     Key Life Concern: Sister--long-term recovery for patient    Spiritual Assessment - Hopes, Needs, Resources:  Patient's sister and brother present while patient was sedated; they shared about patient's medical condition and history as it relates to alcohol use. Sister stated her concern over patient's long-term recovery towards sobriety.   Patient shared about family/life history. Patient has two siblings and a significant other that offer support.   Patient comes from [place] izabella background and derives meaning, purpose, and comfort from izabella. Sister stated that they were raised Anglican, but consider themselves more \"Mandaen\"   We discussed family's framework of belief/izabella in God and how it relates to current life circumstances.     Spiritual Care Provided:     Introduced role of Spiritual Care    Empathic listening    Helped family in processing of emotions    Collaborated with interdisciplinary staff    Offered prayer    Guided family in exploration of beliefs    Facilitated storytelling and life review    Visit Outcomes:    Family expressed appreciation for visit    Family able to articulate reaction to current experience    Family able to identify and utilize own spiritual care resources    Plan of Care:  will continue to visit as able or per request by patient/family/staff.       Gurinder Boykin M.Div., [facility]  , [phone]      "

## 2021-06-08 NOTE — PROGRESS NOTES
Babak Wolff is slowly improving, remains tachycardic, confused        Vitals:    01/21/17 0600 01/21/17 0700 01/21/17 0800 01/21/17 0900   BP:  133/70 142/65 136/63   Patient Position:   Lying    Pulse: (!) 117 (!) 114 (!) 119 (!) 121   Resp: (!) 29 25 (!) 30 (!) 31   Temp:   100.4  F (38  C)    TempSrc:   Oral    SpO2: 93% 95% 94% 92%   Weight:       Height:           PHYSICAL EXAM:  GEN: No acute distress, confused  ABD:soft, incision intact, carmela drain with ascites  EXT:No cyanosis, edema or obvious abnormalities          Results from last 7 days  Lab Units 01/21/17  0619   LN-WHITE BLOOD CELL COUNT thou/uL 21.0*   LN-HEMOGLOBIN g/dL 8.5*   LN-HEMATOCRIT % 25.8*   LN-PLATELET COUNT thou/uL 162         Results from last 7 days  Lab Units 01/21/17  0619   LN-SODIUM mmol/L 143  143   LN-POTASSIUM mmol/L 3.6  3.6   LN-CHLORIDE mmol/L 112*  112*   LN-CO2 mmol/L 25  25   LN-BLOOD UREA NITROGEN mg/dL 20  20   LN-CREATININE mg/dL 0.90  0.90   LN-CALCIUM mg/dL 8.1*  8.1*   LN-ALBUMIN g/dL 2.2*   LN-PROTEIN TOTAL g/dL 5.4*   LN-BILIRUBIN TOTAL mg/dL 5.3*   LN-ALKALINE PHOSPHATASE U/L 74   LN-ALT (SGPT) U/L 62*   LN-AST (SGOT) U/L 165*         A/P:  Babak Wolff is slowly improving  -ok to remove NG tube  -wbc remains up, on abx for now, ?source  -carmela drains to remain in place, bilirubin remains elevated which is worrisome        Robert Hodge D.O. FACS  [phone]  [facility] Department of Surgery

## 2021-06-08 NOTE — PROGRESS NOTES
Chart reviewed.  Patient was extubated yesterday.  Still on Precedex.  Following peripherally.  Discussed with Dr. Pemberton.  Psych consulted today.    Asa Linn MD

## 2021-06-08 NOTE — PROGRESS NOTES
"Pharmacy Consult: Vancomycin Dosing    Pharmacist consulted to dose vancomycin for Babak Wolff, a 61 y.o. male.    Ordering provider: Dr Valente    Indication for vancomycin therapy: worsening leukocytosis - unknown origin    Goal Trough Range:  15-20 mcg/mL as specified by provider    Other current antimicrobials          ceFAZolin 2 g in 100 mL in D5W (ANCEF)    ertapenem 1 g in NaCl 0.9 % 50 mL (MINI-BAG Plus) (INVanz)    meropenem 1 g in NaCl 0.9 % 50 mL (MINI-BAG Plus) (MERREM)    micafungin 100 mg in NaCl 0.9 % 100 mL (MINI-BAG Plus) (MYCAMINE)    nystatin 100,000 unit/mL suspension 500,000 Units (MYCOSTATIN)    rifAXIMin tablet 550 mg (XIFAXAN)        Subjective/Objective:    Patient was admitted for Shock circulatory on 1/9/2017    Height: 6' 3\" (1.905 m)    Actual Body Weight (ABW): (!) 102.5 kg (225 lb 15.5 oz)    Ideal body weight: 84.5 kg (186 lb 4.6 oz)  Adjusted ideal body weight: 91.7 kg (202 lb 2.6 oz)    BMI: Body mass index is 28.24 kg/(m^2).    No Known Allergies    Patient Active Problem List   Diagnosis     Male Erectile Disorder     Eczema     Lower Back Pain     Insomnia     Vitamin D Deficiency     Shock circulatory     Hemorrhage, peritoneal     Acute blood loss anemia     Lactic acidosis     Alcohol withdrawal, uncomplicated     Respiratory failure, post-operative     Encephalopathy     Cognitive disorder     Compulsive behaviors    Past Medical History   Diagnosis Date     Chronic abdominal pain      Insomnia         Temp Readings from Current Encounter:     01/21/17 0300 01/21/17 0800 01/21/17 1100   Temp: (!) 101.1  F (38.4  C) 100.4  F (38  C) 99.1  F (37.3  C)     Net Intake/Output (last 24 hours):  I/O last 3 completed shifts:  In: 5340 [I.V.:385; NG/GT:390; IV Piggyback:2542]  Out: 4585 [Urine:1670; Emesis/NG output:900; Drains:2015]    Recent Labs      01/19/17   0601  01/19/17   1121  01/20/17   0426  01/21/17   0619   WBC  29.3*   --   23.2*  21.0*   NEUTROABS  23.6*   --    --  "  16.5*   PROCAL   --   0.90*   --    --    BUN  17  17   --   22  20  20   CREATININE  0.79  0.79   --   0.84  0.90  0.90     Estimated Creatinine Clearance: 114.6 mL/min (by C-G formula based on Cr of 0.9).    Recent Labs      01/18/17   1259   CULTURE  No Growth       Results for orders placed or performed during the hospital encounter of 01/09/17   Culture, Urine    Collection Time: 01/18/17 12:59 PM   Result Value Status    Culture No Growth Final   Urine culture    Collection Time: 01/15/17  2:12 PM   Result Value Status    Culture No Growth Final   Sputum culture    Collection Time: 01/12/17  4:16 PM   Result Value Status    Culture No Growth Final       Recent labs: (last 7 days)      01/19/17   2245  01/21/17   1101   VANCOMYCIN  11.3  24.0       Vancomycin administrations: (last 120 hours)     Date/Time Action Medication Dose Rate    01/21/17 1140 New Bag    vancomycin 1.5 g in dextrose 5% 500 mL (VANCOCIN) 1.5 g 176.7 mL/hr    01/21/17 0341 New Bag    vancomycin 1.5 g in dextrose 5% 500 mL (VANCOCIN) 1.5 g 176.7 mL/hr    01/20/17 1906 New Bag    vancomycin 1.5 g in dextrose 5% 500 mL (VANCOCIN) 1.5 g 176.7 mL/hr    01/20/17 1124 New Bag    vancomycin 1.5 g in dextrose 5% 500 mL (VANCOCIN) 1.5 g 176.7 mL/hr    01/19/17 2308 New Bag    vancomycin 1.5 g in dextrose 5% 500 mL (VANCOCIN) 1.5 g 176.7 mL/hr    01/19/17 1109 New Bag    vancomycin 1.5 g in dextrose 5% 500 mL (VANCOCIN) 1.5 g 176.7 mL/hr    01/18/17 2203 New Bag    vancomycin 1.5 g in dextrose 5% 500 mL (VANCOCIN) 1.5 g 176.7 mL/hr    01/18/17 1221 New Bag    vancomycin 1.5 g in dextrose 5% 500 mL (VANCOCIN) 1.5 g 176.7 mL/hr          Assessment/Plan:    Pharmacist consulted to dose vancomycin for worsening leukocytosis - unknow origin, goal trough range 15-20 mcg/mL.  1. Change to vancomycin 1.25g IV every 8 hours.  2. Vancomycin trough level of 24.0 mcg/mL was above the goal trough range. This trough level was drawn at steady  state.  3. Pharmacist will plan to re-check a vancomycin trough level in  4-5 doses.  4. Pharmacist will continue to follow.    Thank you for the consult.  Jani Ortega, PharmD 1/21/2017 12:52 PM

## 2021-06-08 NOTE — PROGRESS NOTES
PULMONARY / CRITICAL CARE PROGRESS NOTE    Date / Time of Admission:  1/9/2017  7:44 AM    Assessment:   Principal Problem:    Shock circulatory  Active Problems:    Hemorrhage, peritoneal    Acute blood loss anemia    Lactic acidosis    Alcohol withdrawal, uncomplicated    Respiratory failure, post-operative    Bleeding mesenteric varix  Coagulopathy  Liver cirrhosis  Hemorrhagic shock  Alcohol withdrawal with delirium  RLL nodule, 5mm       Advance Directives:  full      Plan:   Neuro:  Delirium - related to liver, sedation, possible etoh withdrawal.  Not a lot of evident anxiety or pain.  Focus on delirium treatment first.   -precedex, haldol, ativan as needed    CVS:  Hypotension improved off sedation  Follow hgb and transfuse as needed to keep hgb>7    Pulm:  Extubated today after successful SBT    GI:  Alcoholic liver cirrhosis. Bleeding mesenteric varix with hemoperitoneum  Post op now, bleeding is controlled  Changed abx to ertapenem since there was a question of diverticulitis - total 8 days  Protonix IV  TPN    :  MODESTA.  Improving.   Good UO      Hem:  Hgb bid, keep > 7      Hgb down a little today. Keep monitoring.  INR looks fine. He is not bleeding.             ICU DAILY CHECKLIST                           Can patient transfer out of MICU? no    FAST HUG:    Feeding:  Feeding: No.  Patient is receiving NPO    Saeed:Yes  Analgesia/Sedation:Yes fentanyl and precedex  Thromboembolic prophylaxis: yes; Mode:  SCDs  HOB>30:  Yes  Stress Ulcer Protocol Active: yes; Mode: PPI  Glycemic Control: Any glucose > 180 no; Mode of Insulin Therapy: Sliding Scale Insulin and Lantus    INTUBATED:  Can patient have daily waking:  assessing  Can patient have spontaneous breathing trial:  assessing    Restraints? yes    PHYSICAL THERAPY AND MOBILITY:  Can patient have PT and mobility trial: yes  Activity: Bed Rest    Critical Care Time greater than: 40 minutes.  Multiple bedside assessments of respiratory and mental  status      Subjective: less brittle mental status over night.  Did fine on SBT yesterday.  No secretions.   HPI:  Babak Wolff is a 61 y.o. old male with a history of hypertension and alcohol use. He states that he's been dealing with on and off abdominal pain for the past 3 months. He describes the pain as sudden onset and his lower abdomen. This morning he had the same amount of pain particularly worse also tachypneic, felt short of breath and when he tried to stand up he collapsed. She doesn't think she lost consciousness and he states that he just got weak and his knees. He states that his bili is only somewhat distended but it felt more firm today  Emergency room he had a CT of the abdomen that showed the large intraperitoneal hematoma. Hemoglobin was decreased from 12.5-7.1 compared to a few months ago. LFTs slightly elevated including bilirubin and his INR is 1.68. Contour of the liver was irregular.  Initially hypotensive with elevated lactic acid. After he received a liter of normal saline has blood pressure stabilized. After that 2 units of PRBCs were ordered.    Principal Problem:    Shock circulatory  Active Problems:    Hemorrhage, peritoneal    Acute blood loss anemia    Lactic acidosis    Alcohol withdrawal, uncomplicated    Respiratory failure, post-operative      Allergies: Review of patient's allergies indicates no known allergies.     MEDS:  Scheduled Meds:    chlorhexidine  15 mL Topical Q12H     ertapenem (INVanz) IV  1 g Intravenous Q24H     folic acid  1 mg Oral DAILY    Or     folic acid (FOLVITE) IVPB  1 mg Intravenous DAILY     insulin aspart (NovoLOG) injection   Subcutaneous Q4H FIXED TIMES     insulin glargine  8 Units Subcutaneous QAM     pantoprazole  40 mg Intravenous Q24H     senna-docusate  1 tablet Oral BID    Or     senna (SENOKOT) syrup  8.8 mg Enteral Tube BID     sodium chloride  10-30 mL Intravenous Q8H FIXED TIMES     thiamine  100 mg Oral DAILY    Or     thiamine  "(vitamin B1) IVPB  100 mg Intravenous DAILY     Continuous Infusions:    dexmedetomidine 400 mcg/100 mL in NS (PRECEDEX) (4mcg/mL) 0.4 mcg/kg/hr (01/15/17 1006)     dextrose 10%       nacl 0.9% 25 mL/hr (01/15/17 0400)     TPN - Custom formula 50 mL/hr at 01/15/17 0400     PRN Meds:.acetaminophen, benzocaine-menthol, bisacodyl, dextrose 10%, dextrose 50 % (D50W), diphenhydrAMINE, fentaNYL pf, glucagon (human recombinant), haloperidol lactate, hydrALAZINE, LORazepam, magnesium hydroxide, naloxone **OR** naloxone, ondansetron **OR** ondansetron, oxyCODONE, polyvinyl alcohol, sodium chloride, sodium chloride, sodium chloride, Insert peripheral IV **AND** Saline lock IV **AND** sodium chloride      Objective:   VITALS:    Visit Vitals     /54     Pulse 91     Temp 98.5  F (36.9  C) (Axillary)     Resp 16     Ht 6' 3\" (1.905 m)     Wt (!) 255 lb 1.2 oz (115.7 kg)     SpO2 96%     BMI 31.88 kg/m2     EXAM:  General appearance: intubated , sedated. Jaundiced  Head: Normocephalic, without obvious abnormality, atraumatic  Lungs: clear to auscultation bilaterally  Heart: regular rate and rhythm, S1, S2 normal, no murmur, click, rub or gallop  Abdomen: s/p laparotomy, 2 ZACHARY drains in place. Abdomen is soft  Extremities: trace edema marta  Neurologic: responds to voice, glazed over eyes but occasionally tracks.  Skin: warm to touch    I&O:      Intake/Output Summary (Last 24 hours) at 01/15/17 1101  Last data filed at 01/15/17 0850   Gross per 24 hour   Intake   3465 ml   Output   1750 ml   Net   1715 ml       Data Review:    CBC:   Lab Results   Component Value Date    WBC 7.5 01/15/2017    WBC 6.1 06/16/2015    RBC 2.33 (L) 01/15/2017     BMP:   Lab Results   Component Value Date    CO2 23 01/15/2017    CO2 23 01/15/2017    BUN 17 01/15/2017    BUN 17 01/15/2017    CREATININE 0.66 (L) 01/15/2017    CREATININE 0.66 (L) 01/15/2017    CALCIUM 7.3 (L) 01/15/2017    CALCIUM 7.3 (L) 01/15/2017     Serum Glucose range:Invalid " input(s): GLUCOSEPOCT      By:  Alf Shannon, 1/15/2017, 1:11 PM    Primary Care Physician:  Shaheen Smiley MD

## 2021-06-08 NOTE — ED PROVIDER NOTES
"  History     Chief Complaint   Patient presents with     Abdominal Pain     HPI   Babak Wolff is a 61 y.o. male with hx of chronic abdominal pain and insomnia who presents to the Emergency Department via EMS with his wife for evaluation of abdominal pain and syncope. Pt reports he had the onset of suprapubic abdominal pain 3 months ago. He has been following up with his PCP and had u/s and xray of the abdomen with no significant findings. Pt notes he has restless sleep most nights. Last night pt had an episode of watery diarrhea. He states he went to bed feeling fine. This morning pt reports he woke up with the onset of tachypnea and dizziness. When he stood up pt had an episode of syncope. His wife called EMS. Upon arrival, pt rates his abdominal pain as 6/10. He notes the tachypnea has resolved; however, he continues to feel dizzy. Pt denies nausea. He provides no further pertinent past medical or surgical hx. Pt has NKDA.       Pain in his abdomen is tight and squeezing.  7 out of 10.  He knows of nothing that makes it better or worse. No radiation.  No fevers.  Bowel movements have been loose but not bloody or black.  Reports drinking \"a couple of drinks\" 5 times per week  No known history of liver disease.  Reports no history of trauma.  No other complaints.        This document serves as a record of services personally performed by Spencer Mayorga MD. It was created on his behalf by Ilya Mcadams, a trained medical scribe. The creation of this record is based on the scribe's observations of the work being performed by Spencer Mayorga MD and the provider's statements to her. This document has been checked and approved by the attending provider.    Past Medical History   Diagnosis Date     Chronic abdominal pain      Insomnia        Relevant past surgical history reviewed with patient, unless otherwise stated in HPI, history not pertinent to this visit.    No pertinent family history      Social " "History   Substance Use Topics     Smoking status: Never Smoker     Smokeless tobacco: Former User     Types: Chew     Alcohol use 3.0 - 3.5 oz/week     6 - 7 drink(s) per week       Review of Systems   Constitutional: Negative for chills and fever.   HENT: Negative for sore throat.    Eyes: Negative for visual disturbance.   Respiratory: Negative for shortness of breath.    Cardiovascular: Negative for chest pain.   Gastrointestinal: Positive for abdominal pain and diarrhea. Negative for blood in stool and nausea.   Endocrine: Negative for polyuria.   Genitourinary: Negative for dysuria.   Skin: Negative for rash.   Neurological: Positive for dizziness, syncope and light-headedness. Negative for headaches.   All other systems reviewed and are negative.      Physical Exam     Visit Vitals     BP (!) 86/50 (Patient Position: Lying)     Pulse (!) 120     Temp 97.9  F (36.6  C) (Oral)     Resp 18     Ht 6' 3\" (1.905 m)     Wt (!) 250 lb (113.4 kg)     SpO2 95%     BMI 31.25 kg/m2       Physical Exam   Constitutional: He appears well-developed and well-nourished. No distress.   HENT:   Head: Normocephalic and atraumatic.   Eyes: Conjunctivae are normal.   Neck: Neck supple.   Cardiovascular: Normal rate and regular rhythm.    Pulmonary/Chest: Effort normal and breath sounds normal.   Abdominal: Soft. He exhibits distension. He exhibits no mass. There is tenderness (Mild, diffuse). There is no rebound and no guarding.   Musculoskeletal: He exhibits no edema.   Neurological: He is alert.   Skin: Skin is warm and dry.   Psychiatric: He has a normal mood and affect.   Nursing note and vitals reviewed.      ED Course   Procedures    ED Course       MDM     The patient was seen and examined.  IV was established and he was placed on a monitor.  He is hypotensive and tachycardic.  He was lightheaded.  He is given a fluid bolus.  Diagnostics ordered.  I did independently review his imaging and EKG.  Sinus tachycardia with rate " of 114 beats per minute.  Intervals and axis normal.  No significant ST elevation or depression.  No pathological Q waves.    Given his distended abdomen, abdominal pain, hypotension tachycardia did do a bedside ultrasound that did show free fluid.  I did not see any obvious abnormalities of the aorta.     Staff did attempt to get him to a bedside commode and had a second episode of syncope.  No trauma related to this.  He was placed back in bed and placed in Trendelenburg.     He is receiving volume resuscitation with crystalloid fluids at this time.    He does have a fair amount of abnormalities returning including significant lactic acidosis which is likely related to his hypotension.  He does have anion gap which is likely secondary to lactic acid.  A repeat lactic acid level has been ordered and is pending.    Mild renal insufficiency.  Elevated bilirubin with mildly elevated AST with increased ratio to ALT.  This would be suggestive of some alcoholic liver disease which I am suspecting.    Significant anemia noted with hemoglobin of 7.1.  Given that he is hypotensive and tachycardic I have ordered for blood transfusion and additional units to be crossmatched.    Blood products have been initiated.  His blood pressure has improved with the last reading of 109/56.  Tachycardia is resolving.  He is feeling better.    I was contacted by our radiologist and we did discuss his CTA.  No obvious abnormality of the aorta.  He does have what appears to be hemoperitoneum, however the source cannot be identified.  Angiography has been discussed.    I did contact Dr. Hodge with general surgery and he recommended involving interventional radiology but he will be on consult.  I did speak with interventional radiology   There is a plan for by our angiography later this morning.  I was able to contact our intensivist and admitting physician.  Plan is to admit to the ICU.  He has been improving here.  Blood pressure and heart  rate improving.  He will be watched for alcohol withdrawal as I suspect significant alcohol abuse history.  Hospitalist also notified and in agreement.  Patient updated with workup and plan, all questions answered and he is in agreement.    Critical Care   Due the complex nature of this patient's hemorrhagic shock requiring multiple bedside evaluations, intervention of aggressive volume resuscitation with crystalloid as well as blood products, as well as multiple discussions with admission physicians and consultants, I would like to bill for a critical care time of 70 minutes. This is exclusive of any billable procedures.          Final Diagnoses     1. Hemoperitoneum    2. Hypotension    3. Tachycardia    4. Liver disease    5. Anemia    6. Lactic acidosis         I, Spencer Mayorga MD, personally performed the services described in this documentation, as scribed by Ilya Mcadams in my presence, and it is both accurate and complete.     Spencer Mayorga MD  01/09/17 8020

## 2021-06-08 NOTE — PROGRESS NOTES
ICU PROGRESS NOTE:    Assessment/Plan:  Babak Wolff is a 61 y.o. 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    NEURO:  Acute encephalopathy, agitated delirium: Multiple factors including possible hepatic encephalopathy, septic encephalopathy.  Ammonia on admission 17.  EtOH level negative on admission, unknown most recent EtOH use.    Recheck ammonia - only 34    Dexmedetomidine as needed    Olanzapine at     Psychiatry had recommended scheduled gabapentin - will schedule and clamp NG tube if tolerated    Assess for and treat causes of sepsis    CVS:  No active issues.    monitor    RESP:  No active issues.    monitor    GI:  Hemoperitoneum, likely advanced liver disease/cirrhosis, alcohol dependence: Patient has had chronic intermittent abdominal pain.  Had minimized alcohol use as outpatient.  Brother and sister confirm that he is a severe alcoholic, after moving back here from [place] he had multiple empty 1.75-L vodka bottles throughout his home, would start drinking in the morning and was often intoxicated by mid-day; his live-in girlfriend has reportedly had to bring vodka bottles to work with her to prevent him from drinking them.  Now with hemoperitoneum, mesenteric varix, coagulopathy with INR 1.5-1.7, tbili 4-5, -120 (now higher likely reactive to acute infection), albumin in 2s.  CT with evidence of diffuse liver disease.    Ammonia as above, schedule lactulose as needed    High meld score and not a transplant candidate because of ongoing alcohol abuse.  He continues to have worsening hepatic encephalopathy which does not appear to be responding to lactulose either.    RENAL:  Currently stable kidney  function.    monitor    ID:  Hemoperitoneum, fever, neutrophilia: High risk of peritonitis, and high risk of fungal peritonitis/fungemia with hemoperitoneum, liver disease and TPN.  On meropenem and vanco but temp up to 39.7 overnight, WBC up to 23.2. Continues to spike fevers and in the absence of a source these may be related to alcoholic hepatitis.    ID consult - appreciated    We will discontinue meropenem and vancomycin but continue micafungin elevation is still receiving TPN.    US Venous doppler of extremities to rule out thrombophlebitis.    Repeat blood cultures (PICC and peripheral) are pending from 1/19    Fungal blood culture--pending from 1/19    HEMATOLOGIC:  Hemoperitoneum, anemia: Acute blood loss and anemia of sepsis/critical illness. Coagulopathy, likely due to cirrhosis, nutritional, consumptive    Monitor with restrictive transfusion threshold    Monitor INR prn    TPN for nutrition    ENDOCRINE:  No active issues.    Monitor    ICU PROPHYLAXIS:    SCDs    PPI    DISPO/CODE STATUS: DNR.  Today (1/22/2017), the patient is critically ill with agitated delirium.    FAMILY COMMUNICATION: Had another family conference this morning with the patient's 2 brothers, 1 sister, 1 and a girlfriend and we discussed again that the patient has very advanced liver disease which is necessitating consideration of a transplant.  I also explained to them that given the patient's ongoing alcohol abuse he is presently not a candidate for transplant and that given that his medical condition is not improving it may be more appropriate for the patient to be on hospice or at least approach From a more comfort-based approach.  Patient's family was quite tearful as he stated that he did not have a clear understanding of the severity of his disease but agree that he would not want to continue living in this fashion.  To this effect we have made the patient DNR and have placed a consult for hospice.  Of note the patient's  "girlfriend would not be able to manage his needs at home and we would likely consider transferring him to a nursing home on hospice.    Lines/Drains/Tubes:  TERRI PICC, Placed on 1/14/17  OG tube  Saeed catheter  ZACHARY peritoneal drains x 2    Overnight events:  Continuing to spike fevers.    Subjective:  Unable to answer yes/no questions.    Objective:  Physical Exam:  Vent settings for last 24 hours: none       Visit Vitals     /65     Pulse (!) 121     Temp 99.1  F (37.3  C) (Oral)     Resp (!) 31     Ht 6' 3\" (1.905 m)     Wt (!) 224 lb 3.3 oz (101.7 kg)     SpO2 92%     BMI 28.02 kg/m2       Intake/Output last 3 shifts:  I/O last 3 completed shifts:  In: 2808 [I.V.:636.1; NG/GT:120; IV Piggyback:150]  Out: 5425 [Urine:1600; Emesis/NG output:1650; Drains:2175]  Intake/Output this shift:  I/O this shift:  In: 120 [NG/GT:120]  Out: 850 [Urine:300; Emesis/NG output:200; Drains:350]    Physical Exam   Constitutional:   Chronically ill appearing gentleman, uncomfortable.   HENT:   Head: Normocephalic.   Eyes: Pupils are equal, round, and reactive to light. Scleral icterus is present.   Neck: Normal range of motion.   Cardiovascular:   Tachycardia, S1 and S2 audible.   Pulmonary/Chest:   Tachypnea, BL BS audible.   Abdominal:   Distended abdomen, no tenderness, drains noted with significant output. No BS appreciated.   Musculoskeletal: He exhibits edema.   Neurological:   Agitated.   Skin: Skin is warm.     Gen: somnolent, tearful, can say his name but difficulty answering other questions  HEENT: no OP lesions, no TOMMY  CV: RRR, no m/g/r  Resp: CTAB  Abd: soft, nontender, BS+  Neuro: PERRL, nonfocal  Ext: no edema    LAB:    Results from last 7 days  Lab Units 01/22/17  0629   LN-WHITE BLOOD CELL COUNT thou/uL 20.3*   LN-HEMOGLOBIN g/dL 8.5*   LN-HEMATOCRIT % 26.3*   LN-PLATELET COUNT thou/uL 165       Results from last 7 days  Lab Units 01/22/17  0629 01/21/17  0619 01/20/17  1544 01/20/17  0426   LN-SODIUM mmol/L " 146* 143  143  --  146*   LN-POTASSIUM mmol/L 3.7 3.6  3.6 3.1* 3.3*   LN-CHLORIDE mmol/L 113* 112*  112*  --  114*   LN-CO2 mmol/L 25 25  25  --  25   LN-BLOOD UREA NITROGEN mg/dL 25* 20  20  --  22   LN-CREATININE mg/dL 0.95 0.90  0.90  --  0.84   LN-CALCIUM mg/dL 8.1* 8.1*  8.1*  --  7.9*   LN-PROTEIN TOTAL g/dL 5.9* 5.4*  --  5.2*   LN-BILIRUBIN TOTAL mg/dL 5.6* 5.3*  --  4.3*   LN-ALKALINE PHOSPHATASE U/L 66 74  --  65   LN-ALT (SGPT) U/L 59* 62*  --  41   LN-AST (SGOT) U/L 143* 165*  --  124*         MAR reviewed.    ICU DAILY CHECKLIST                           Can patient transfer out of MICU? no    FAST HUG:    Feeding:  Feeding: TPN, OG to LIS.  Saeed:Yes  Analgesia/Sedation:Yes dexmedetomidine  Thromboembolic prophylaxis: yes; Mode:  SCDs  HOB>30:  Yes  Stress Ulcer Protocol Active: yes; Mode: H2 Antagonist  Glycemic Control: Any glucose > 180 yes; Mode of Insulin Therapy: Sliding Scale Insulin    INTUBATED:  Can patient have daily waking:  not applicable  Can patient have spontaneous breathing trial:  not applicable    Restraints? no    PHYSICAL THERAPY AND MOBILITY:  Can patient have PT and mobility trial: no  Activity: Bed Rest    Total Critical Care Time : 30 Minutes      Leigh Camiloqvi  Pulmonary and Critical Care  [id number]

## 2021-06-08 NOTE — PROGRESS NOTES
Nutrition Note    Diet  Reg    TPN d/charley    Pt is now comfort cares.  Will sign off    Available prn

## 2021-06-08 NOTE — PROGRESS NOTES
General Surgery Progress Note  Hospital Day # 1    Subjective:   CC:ascites with spontaneous intra-abdominal hemorrhage  Status:-hemodynamically stable, transfused prbcs x 3  With moderate increase in h/h, pain minimal    Vitals:    01/10/17 0530 01/10/17 0600 01/10/17 0601 01/10/17 0833   BP:  (!) 182/79 167/71 141/75   Patient Position:    Lying   Pulse: (!) 111 (!) 112 (!) 112 (!) 108   Resp: (!) 33 (!) 51 (!) 36 (!) 37   Temp:    98  F (36.7  C)   TempSrc:    Oral   SpO2: 93% 93% 94% 92%   Weight: (!) 263 lb 4.8 oz (119.4 kg)      Height:           Physical Exam:  General: NAD, pleasant  ABD: distended, mild ttp, no peritoneal findings  EXT:no CCE      Results from last 7 days  Lab Units 01/10/17  0806 01/10/17  0432   LN-WHITE BLOOD CELL COUNT thou/uL  --  19.2*   LN-HEMOGLOBIN g/dL 8.5* 9.1*   LN-HEMATOCRIT %  --  26.8*   LN-PLATELET COUNT thou/uL  --  174         Results from last 7 days  Lab Units 01/10/17  0432   LN-SODIUM mmol/L 133*   LN-POTASSIUM mmol/L 4.9   LN-CHLORIDE mmol/L 106   LN-CO2 mmol/L 19*   LN-BLOOD UREA NITROGEN mg/dL 20   LN-CREATININE mg/dL 1.14   LN-CALCIUM mg/dL 7.8*   LN-ALBUMIN g/dL 2.6*   LN-PROTEIN TOTAL g/dL 5.8*   LN-BILIRUBIN TOTAL mg/dL 3.7*   LN-ALKALINE PHOSPHATASE U/L 69   LN-ALT (SGPT) U/L 49*   LN-AST (SGOT) U/L 242*       Assessment: Ascites with intra-abdominal hemorrhage, source unknown    Plan: -h/h relatively stable  -remains tachycardic- possible etoh withdrawal   -no surgery planned at present  -continue to monitor h/h  -expect intra-abdominal blood to absorb over time    Silvio Hodge [facility]  [phone]

## 2021-06-08 NOTE — PROGRESS NOTES
Clinical Nutrition Therapy Reassessment Note Parenteral    Reason for Assessment:  nutrition support.    Current Nutrition Prescription:   Diet: NPO  TPN: D20 AA8 @ 75 cc/hr  250 ml of 20% lipids 4x/week    Current Nutrition Intake:  Parenteral nutrition access is a centeral line. The current nutritonal order will provide, 2085 kcals, 144 grams protein, 360 grams carbohydrate, 28.5 grams fat daily average, and 1800 mls fluid daily (not including lipids). The GIR is 2.4 mg/kg/minute     Total nutrient intake from all sources meets estimated nutritional needs.    Anthropometrics:  Most recent weight: 103 Kg    GI Status/Output:   The patient's GI symptoms include: abdominal distention  Bowel Sounds present/ small loose green stool noted    Skin/Wound:  Bertin score  13    Medications:  Medications reviewed.    Labs:  Prealbumin 5.8 (1/17)    Nutrition Risk Level: high risk    Nutrition Dx: Inadequate po intake related to altered GI function (hemoperitoneum/ruptured mesenteric varix/liver cirrhosis with ascites) as evidenced by need for TPN/nutrition support    Goal:   Meet nutritional needs via TPN until able to transition to po diet    Intervention:  No changes recommended in TPN    Monitoring:  TPN tolerance, weight, nutritional needs, plan of care    See Care Plan for further Problems, Goals, and Interventions.

## 2021-06-08 NOTE — CONSULTS
[facility] Consult     Impression:   1. Alcoholic liver disease with cirrhosis, MELD 17= 27% 3 mos mortality with recent ongoing severe etoh abuse, not liver transplant candidate due to active ETOH abuse  2. Ascites - decreasing output, may respond to diuretics if not septic.  Will hold on initiating for now but will have to keep up with ascites loses.  Low threshold for IV albumin but good renal function for now and he hasn't gotten any in a few days  3. Encephalopathy - multifactorial but some is definitely hepatic with asterixis, now with sepsis, and on multiple sedatives  4. Bleeding abdominal varix s/p ligation without further bleeding  5. Alcohol withdrawal - should be resolved by now  6. Malnutrition on TPN. Recent vomiting may be from infection that had caused increase in WBC as no bowel obstruction on CT. Agree with checking stool studies if stool.   7. Ileus - distended abdomen with increased tympany but significant decrease in NG output from 2.5L to 300mL    Recommendation:   1. Agree with lactulose.  Can also add rifaximin FT as he does have asterixis despite low ammonia.   2. Low threshold to repeat Abd Xray to look for ileus.   3. Aggressive monitoring and treatment of infections  4. Limit sedatives  5. Can hopefully start PO diet soon, may need swallow eval  6. Consider starting aldactone in next few days if sepsis/infection clears          Name: Babak Wolff    Medical Record #: [id number]    YOB: 1955    Date/Time: 1/20/2017/11:34 AM    Reason for Consultation: Johnnie Lockett MD has asked me to evaluate Babak Wolff regarding liver disease.    HPI: This is a 61 y.o. male  Without significant known medical history except for alcoholism who came in with acute abdominal pain and anemia and intra-abdominal bleeding on 1/9/17, eventually went for ex-lap on 1/11 and found to have bleeding pericolic varix that was ligated.  Extensive review of chart, little history  from patient himself.  Liver was cirrhotic appearing with elevated INR.  It was determined that he drinks over a liter of alcohol per day.  He had hemorrhagic shock requiring pressors and intubation as well as alcohol withdrawal and MODESTA.  He required significant sedatives, haldol, atival, precedex, fentanly.  He was extubated on 1/15 and has been agitated, not following commands, sedated.  Yesterday he spiked a fever.     Two days ago he vomited, NG placed.  Repeat CT yesterday showed drains in place, NG in place, trace ascites/fluid, no bowel obstruction. He has still been febrile to 102.1, expanding anti-fungal and antibiotic coverage.  Blood cultures pending but no growth since 1/18.  He is on TPN.  Having small green stools.  Ammonia yesterday was 34, procalcitonin elevated at 0.9, WBC 23 (down from peak of 29 yesterday), Cr 0.8, Total bili 4.3 (no significant change in 5 days), rising ALT to 124 with ALT 41, normal alk phos, albumin 2.4, INR 1.7.    He has significant drain output of ascites, was as much as 9L per day, now 1-2L per day.     Past Medical History   Diagnosis Date     Chronic abdominal pain      Insomnia      Past Surgical History   Procedure Laterality Date     Exploratory laparotomy N/A 1/11/2017     Procedure: EXPLORATORY LAPAROTOMY, EVACUATION OF HEMAPERITONEUM and ASCITES, OVERSEWING MESENTERIC VARIX;  Surgeon: Robert Hodge DO;  Location: [facility];  Service:      Picc  1/14/2017             Prescriptions Prior to Admission   Medication Sig Dispense Refill Last Dose     amLODIPine (NORVASC) 10 MG tablet Take 10 mg by mouth daily.   1/9/2017 at am     aspirin 81 MG EC tablet Take 81 mg by mouth daily.   1/9/2017 at am     diclofenac (VOLTAREN) 75 MG EC tablet Take 75 mg by mouth 2 (two) times a day as needed.   1/9/2017 at am     diphenhydrAMINE-acetaminophen (TYLENOL PM EXTRA STRENGTH)  mg Tab Take 1-2 tablets by mouth bedtime as needed.    1/8/2017 at      glucosamine  sulfate 500 mg cap Take 500 mg by mouth daily.   1/9/2017 at am     magnesium oxide 250 mg Tab Take 250 mg by mouth daily.   1/9/2017 at am     omeprazole (PRILOSEC) 10 MG capsule Take 10 mg by mouth Daily before breakfast.   1/9/2017 at am     traZODone (DESYREL) 150 MG tablet Take 150 mg by mouth bedtime as needed for sleep.   1/8/2017 at hs     zolpidem (AMBIEN) 5 MG tablet Take 5 mg by mouth bedtime as needed for sleep.   1/8/2017 at hs     sildenafil (VIAGRA) 100 MG tablet Take 1 tablet (100 mg total) by mouth daily as needed. 12 tablet 5 Unknown          Current Facility-Administered Medications   Medication Dose Route Frequency Provider Last Rate Last Dose     acetaminophen suppository 325 mg (TYLENOL)  325 mg Rectal Q4H PRN Chi Watson MD   325 mg at 01/20/17 0051     albuterol nebulizer solution 2.5 mg (PROVENTIL)  2.5 mg Nebulization Q4H PRN Joy Delgado CNP         benzocaine-menthol lozenge 1 lozenge (CEPACOL)  1 lozenge Oral Q1H PRN Chi Watson MD         bisacodyl suppository 10 mg (DULCOLAX)  10 mg Rectal Daily PRN Chi Watson MD   10 mg at 01/17/17 1424     dexmedetomidine 400 mcg/100 mL in NS (PRECEDEX) (4mcg/mL)  0.2-1.4 mcg/kg/hr Intravenous Continuous Joy Schwartz CNP   Stopped at 01/19/17 2330     dextrose 10%  0-200 mL/hr Intravenous Continuous PRN Alf Shannon MD         dextrose 50 % (D50W) syringe 20-50 mL  20-50 mL Intravenous PRN Chi Watson MD         fat emulsion 20 % infusion 250 mL (INTRALIPID,LIPOSYN)  250 mL Intravenous Once per day on Sun Tue Thu Sat Alf Shannon MD 10.4 mL/hr at 01/19/17 1955 250 mL at 01/19/17 1955     fentaNYL pf injection 25-50 mcg (SUBLIMAZE)  25-50 mcg Intravenous Q1H PRN Alf Shannon MD   50 mcg at 01/19/17 0158     gabapentin 250 mg/5 mL solution 250 mg (NEURONTIN)  250 mg Enteral Tube Q8H FIXED TIMES Johnnie Lockett MD   250 mg at 01/20/17 0457     gabapentin 250 mg/5 mL solution  500 mg (NEURONTIN)  500 mg Enteral Tube BID PRN Sarah K Kenny, CNP         glucagon (human recombinant) injection 1 mg  1 mg Subcutaneous PRN Chi Watson MD         heparin (PF) subcutaneous injection 5,000 Units  5,000 Units Subcutaneous Q12H 09-21 Joy Schwartz CNP   5,000 Units at 01/20/17 0820     hydrALAZINE injection 10 mg (APRESOLINE)  10 mg Intravenous Q6H PRN Chi Watson MD   10 mg at 01/19/17 0455     insulin aspart injection (NovoLOG)   Subcutaneous Q6H FIXED TIMES Joy Schwartz CNP   2 Units at 01/20/17 0521     insulin glargine injection 10 Units (LANTUS)  10 Units Subcutaneous BID Joy Schwartz CNP   10 Units at 01/20/17 0819     labetalol 20 mg/4 mL (5 mg/mL) injection 10-20 mg (TRANDATE)  10-20 mg Intravenous Q4H PRN Gela Daarmando   10 mg at 01/19/17 0618     LORazepam injection 1 mg (ATIVAN)  1 mg Intravenous TID Sarah K Kenny, CNP   1 mg at 01/20/17 0820     LORazepam injection 1-2 mg (ATIVAN)  1-2 mg Intravenous Q4H PRN Joy Delgado CNP   2 mg at 01/20/17 0050     magnesium hydroxide suspension 30 mL (MILK OF MAG)  30 mL Oral Daily PRN Chi Watson MD   30 mL at 01/16/17 1949     meropenem 1 g in NaCl 0.9 % 50 mL (MINI-BAG Plus) (MERREM)  1 g Intravenous Q8H Ry Pemberton  mL/hr at 01/20/17 1123 1 g at 01/20/17 1123     micafungin 100 mg in NaCl 0.9 % 100 mL (MINI-BAG Plus) (MYCAMINE)  100 mg Intravenous Q24H Katheryn Valente  mL/hr at 01/19/17 1442 100 mg at 01/19/17 1442     morphine injection 1-2 mg  1-2 mg Intravenous Q4H PRN Joy Schwartz CNP   2 mg at 01/20/17 0154     naloxone injection 0.04-0.1 mg (NARCAN)  0.04-0.1 mg Intravenous PRN Spencer Mayorga MD        Or     naloxone injection 0.1-0.4 mg (NARCAN)  0.1-0.4 mg Intramuscular PRN Spencer Mayorga MD         nystatin 100,000 unit/mL suspension 500,000 Units (MYCOSTATIN)  5 mL Swish & Swallow QID Gela Pinon   500,000 Units at 01/20/17 0819     OLANZapine tablet  5 mg (zyPREXA)  5 mg Enteral Tube QHS Johnnie Lockett MD   5 mg at 01/19/17 2059     ondansetron injection 4 mg (ZOFRAN)  4 mg Intravenous Q4H PRN Chi Watson MD   4 mg at 01/18/17 1720    Or     ondansetron tablet 8 mg (ZOFRAN)  8 mg Oral Q8H PRN Chi Watson MD         oxyCODONE immediate release tablet 5 mg (ROXICODONE)  5 mg Oral Q4H PRN Chi Watson MD   5 mg at 01/20/17 0457     polyvinyl alcohol 1.4 % ophthalmic solution 1-2 drop (LIQUIFILM TEARS)  1-2 drop Both Eyes Q1H PRN Chi Watson MD   2 drop at 01/16/17 1742     senna-docusate 8.6-50 mg tablet 1 tablet (PERICOLACE)  1 tablet Oral BID Chi Watson MD   1 tablet at 01/18/17 0832    Or     sennosides syrup 8.8 mg (for SENOKOT)  8.8 mg Enteral Tube BID Chi Watson MD   8.8 mg at 01/17/17 1111     sodium chloride 0.45%  25 mL/hr Intravenous Continuous Gela Dahm 25 mL/hr at 01/18/17 0713 25 mL/hr at 01/18/17 0713     sodium chloride 0.9 % flush 10-20 mL (NS)  10-20 mL Intravenous PRN Alf Shannon MD   10 mL at 01/17/17 0425     sodium chloride 0.9 % flush 10-30 mL (NS)  10-30 mL Intravenous PRN Alf Shannon MD         sodium chloride 0.9 % flush 10-30 mL (NS)  10-30 mL Intravenous Q8H FIXED TIMES Alf Shannon MD   10 mL at 01/20/17 0511     sodium chloride 0.9 % flush 20 mL (NS)  20 mL Intravenous PRN Alf Shannon MD         sodium chloride 0.9 % flush 3 mL (NS)  3 mL Intravenous PRN Spencer Mayorga MD   10 mL at 01/11/17 1417     sodium phosphates 133 mL rectal enema 1 enema (for FLEET)  1 enema Rectal Daily PRN Gela Pinon   1 enema at 01/16/17 2115     TPN - Custom formula   Intravenous TPN - see admin instructions Johnnie Lockett MD 75 mL/hr at 01/19/17 2200       TPN - Custom formula   Intravenous TPN - see admin instructions Leigh Milner MD         vancomycin 1.5 g in dextrose 5% 500 mL (VANCOCIN)  1.5 g Intravenous Q8H Joy Schwartz, CNP  "176.7 mL/hr at 01/20/17 1124 1.5 g at 01/20/17 1124       Allergies: Review of patient's allergies indicates no known allergies.      History reviewed. No pertinent family history.      Social History     Social History     Marital status:      Spouse name: N/A     Number of children: N/A     Years of education: N/A     Occupational History     Not on file.     Social History Main Topics     Smoking status: Never Smoker     Smokeless tobacco: Former User     Types: Chew     Alcohol use Yes      Comment: Heavy alcohol use.  Brother and sister note longstanding consumption of vodka in large quantities, intoxication by morning-midday, multiple empty 1.75-L vodka bottles in home.     Drug use: Yes      Comment: occasional marijuana     Sexual activity: Yes     Partners: Female     Other Topics Concern     Not on file     Social History Narrative       Review of Systems (ROS): Complete review of systems is performed, see HPI for details, all other systems are reviewed and negative.      Physical Exam:   Visit Vitals     /67     Pulse (!) 125     Temp (!) 100.8  F (38.2  C) (Oral)     Resp (!) 29     Ht 6' 3\" (1.905 m)     Wt (!) 227 lb 4.7 oz (103.1 kg)     SpO2 93%     BMI 28.41 kg/m2       General: No distress, sitting up in chair.  Eyes: + scleral icterus  Oropharynx: Within normal limits  Lymphatics: No anterior cervical or supra-clavicular lymphadenopathy.  Pulmonary: Lungs are clear to auscultation bilaterally  Cardiovascular: Regular, tachy, normal rhythm  Gastrointestinal: Positive bowel sounds, soft, mod distention, non-tender.  Drains in LLQ and RLQ and midline staple line.  Musculoskeletal: No peripheral edema  Neurologic: Alert and oriented, some slowing of words/movements, speaking in full sentences, doesn't remember what happened, moving all extremities, +asterixis  Skin: jaundice    Labs:    Results from last 7 days  Lab Units 01/20/17  0426 01/19/17  0601 01/18/17  0501   LN-WHITE BLOOD CELL " COUNT thou/uL 23.2* 29.3* 14.7*   LN-HEMOGLOBIN g/dL 8.4* 9.2* 8.5*   LN-HEMATOCRIT % 25.6* 28.0* 25.7*   LN-PLATELET COUNT thou/uL 164 207 149          Results from last 7 days  Lab Units 01/20/17  0426 01/19/17  0601 01/18/17  0501   LN-SODIUM mmol/L 146* 148*  148* 148*   LN-POTASSIUM mmol/L 3.3* 3.6  3.6 3.6   LN-CHLORIDE mmol/L 114* 117*  117* 117*   LN-CO2 mmol/L 25 24  24 24   LN-BLOOD UREA NITROGEN mg/dL 22 17 17 19   LN-CREATININE mg/dL 0.84 0.79  0.79 0.81   LN-CALCIUM mg/dL 7.9* 8.1*  8.1* 8.2*         Results from last 7 days  Lab Units 01/20/17  0426 01/19/17  0601 01/18/17  0501   LN-ALKALINE PHOSPHATASE U/L 65 69 55   LN-BILIRUBIN TOTAL mg/dL 4.3* 5.1* 4.2*   LN-PROTEIN TOTAL g/dL 5.2* 5.5* 5.0*   LN-ALT (SGPT) U/L 41 37 26   LN-AST (SGOT) U/L 124* 108* 73*     LIPASE   Date Value Ref Range Status   01/09/2017 43 0 - 52 U/L Final   12/05/2016 64 (H) 0 - 52 U/L Final   11/29/2011 28 <53 IU/L Final       Results from last 7 days  Lab Units 01/17/17  0442 01/16/17  0408 01/14/17  1122   LN-INR  1.68* 1.63* 1.48*       Radiology:   CT ABDOMEN/PELVIS WO ORAL WO IV CONTRAST  1/19/2017 10:08 AM  FINDINGS:  LUNG BASES: Dependent atelectasis bilaterally.  ABDOMEN: NG tube in the stomach. New surgical drains. Evacuation of previously seen hemoperitoneum. Hepatomegaly. No liver lesions on this noncontrast CT. Normal spleen, pancreas, adrenal glands, and right kidney. 3 mm nonobstructing left renal calculus.  PELVIS: Trace free pelvic fluid. Saeed catheter in the bladder.  MUSCULOSKELETAL: Negative.  CONCLUSION:  1. No acute abnormality in the abdomen or pelvis.  2. Status post laparotomy with placement of surgical drains since the prior study.      IR Procedure  1/9/2017 2:28 PM  PROCEDURE:   1. CELIAC ARTERY ANGIOGRAM  2. SPLENIC ANGIOGRAM  3. SELECTIVE HEPATIC ARTERY ANGIOGRAPHY  4. ULTRASOUND-GUIDED ACCESS INTO THE RIGHT COMMON FEMORAL ARTERY  5. MODERATE SEDATION  IMPRESSION:   Splenic artery, right  hepatic artery, and left hepatic artery angiograms are unremarkable without evidence of extravasation or arterial abnormality. No tumoral blush identified.    CTA CHEST ABDOMEN PELVIS  1/9/2017 9:19 AM  CONCLUSION:  1. Large amount of hemoperitoneum. The bleeding source is not identified.  2. Abnormal liver suggesting diffuse parenchymal disease, and potentially steatosis, fibrosis, and/or mild cirrhosis. There are also focal hypodense lesions in liver which are indeterminate. MRI could assess further.  3. There are potentially some mild varices in the upper abdomen-retroperitoneum. However the spleen is not enlarged and the portal vein is not abnormally distended.  4. Soft tissue stranding in the omental fat probably inflammatory, neoplastic infiltration less likely but not excluded.  5. No evidence for aortic dissection or aneurysm.  6. 5 mm nodule right lower lobe. See below for follow-up guidelines.    Jim Harris MD  1/20/2017/11:34 AM  [facility]  Office: [phone]

## 2021-06-08 NOTE — PROCEDURES
Interventional Radiology Post-Procedure Note   [facility]  ?   Brief Procedure Note:   Patient name: Babak Wolff  Pt MRN:[id number]   Date of procedure: 1/9/2017     Procedure(s): Hepatic and Splenic angiogram  Sedation method: Moderate sedation was employed. The patient was monitored by an interventional radiology nurse at all times throughout the procedure under my direct guidance.  Pre Procedure Diagnosis: Hemoperitoneum, possible cirrhosis  Post Procedure Diagnosis: same  Indications: Hemoperitoneum, requiring transfusions   ?   Attending: Johnnie Renee M.D.  Specimen(s) removed: None   Additional studies ordered: None  Drains: None   Estimated Blood Loss: Minimal  Complications: None  Vascular closure method: Manual pressure   Findings/Notes/Comments: Selective angiography of the splenic artery, right hepatic artery and left hepatic artery demonstrating no arterial abnormality or evidence of extravasation. No intervention was performed.   ?   Please see dictation in PACS or under the Imaging tab in [facility] for detailed procedure note.     Johnnie Renee M.D.   Vascular and Interventional Radiology   Pager: [phone]   After Hours / Scheduling: [phone]     1/9/2017  2:32 PM

## 2021-06-08 NOTE — CONSULTS
Consultation - INFECTIOUS DISEASE CONSULTATION  Babak Wolff,  1955, MRN [id number]      Lactic acidosis [E87.2]  Hemoperitoneum [K66.1]  Liver disease [K76.9]  Anemia [D64.9]  Tachycardia [R00.0]  Hypotension [I95.9]  Bleeding [R58]    PCP: Shaheen Smiley MD, [phone]   Code status:  Full Code               Chief Complaint: Shock circulatory     Assessment:  Babak Wolff is a 61 y.o. 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       Principal Problem:    Shock circulatory  Active Problems:    Hemorrhage, peritoneal    Acute blood loss anemia    Lactic acidosis    Alcohol withdrawal, uncomplicated    Respiratory failure, post-operative    Encephalopathy    Cognitive disorder    Compulsive behaviors    No clear new source of infection at this time. At high risk for fungal infection given TPN, PICC and ESLD. Repeat CT recently not concerning for new infection.       Recommendations:   Agree with Vanco + Meropenem  Repeat blood cultures  Add Micafungin   Collect stools as soon as possible  Low threshold to add IV Flagyl and PO Vanco    Discussed with the patient's sister and nursing staff.     Thank you for letting us be part of the patient care team. We will follow    MARIUSZ Valente MD  [facility]  [phone].  HPI:    Babak Wolff is a 61 y.o. old male. History is provided by chart review.  ID is asked to see this patient for fever and worsening leukocytosis.   Patient alcohol abuse admitted a number of days ago with hemoperitoneum. Initially conservative management but eventually had surgery on . Has been on Ertapenem since  for concern for diverticulitis intra-op. On  new fever along with worsened WBC. New cultures obtained. Vanco added and Ertapenem changed to Renu. Fever continued and WBC worsened up to 29 K today. Was constipated for 8 days but had  diarrhea yesterday. C Diff ordered but not collected yet secondary to no more stools since then. Had repeat CT today with no new findings.       Brief hospital summary:   1/9 Admission; transfused 2U PRBC. CIWA initiated for withdrawal.   1/10 Increasing shortness of breath due to ascites overload; had paracentesis with 7.5 liters removed.  1/11 Precedex added for agitation; MODESTA, hypotensive, hemoglobin drop to 6; transfused 4-units PRBC, 4U FFP, vitamin K;. KCentra given. CT demonstrated large volume hemoperitoneum. Taken to OR by Dr. Hodge - found bleeding right pericolic mesenteric varices. Returned to ICU intubated. Hypotensive, required Levophed and Vaso.   1/12 Abx changed to ertapenem due to question of diverticulitis  1/14 PICC Placed; large amount of fluid from ZACHARY drains - liters. Drains clamped.   1/15 Extubated  1/16 Psych consulted to help with behavior; QTc 500. ZACHARY drains unclamped - 4L removed before noon. TPN started.   1/17 Started stooling after no BM x 8 days  1/18 Fever spike to 101.3; Vancomycin added. Cultures requested.       Medical History  Active Ambulatory (Non-Hospital) Problems    Diagnosis     Vitamin D Deficiency     Male Erectile Disorder     Eczema     Lower Back Pain     Insomnia     Past Medical History   Diagnosis Date     Chronic abdominal pain      Insomnia         Surgical History  He  has a past surgical history that includes Exploratory laparotomy (N/A, 1/11/2017) and PICC (1/14/2017).       Social History  Reviewed, and he  reports that he has never smoked. He has quit using smokeless tobacco. His smokeless tobacco use included Chew. He reports that he drinks about 3.0 - 3.5 oz of alcohol per week  He reports that he uses illicit drugs.        Family History  Reviewed, and no FH contributory to the current disease process.    Psychosocial Needs  Social History     Social History Narrative     Additional psychosocial needs reviewed per nursing assessment.       No Known  Allergies   Prescriptions Prior to Admission   Medication Sig Dispense Refill Last Dose     amLODIPine (NORVASC) 10 MG tablet Take 10 mg by mouth daily.   1/9/2017 at am     aspirin 81 MG EC tablet Take 81 mg by mouth daily.   1/9/2017 at am     diclofenac (VOLTAREN) 75 MG EC tablet Take 75 mg by mouth 2 (two) times a day as needed.   1/9/2017 at am     diphenhydrAMINE-acetaminophen (TYLENOL PM EXTRA STRENGTH)  mg Tab Take 1-2 tablets by mouth bedtime as needed.    1/8/2017 at hs     glucosamine sulfate 500 mg cap Take 500 mg by mouth daily.   1/9/2017 at am     magnesium oxide 250 mg Tab Take 250 mg by mouth daily.   1/9/2017 at am     omeprazole (PRILOSEC) 10 MG capsule Take 10 mg by mouth Daily before breakfast.   1/9/2017 at am     traZODone (DESYREL) 150 MG tablet Take 150 mg by mouth bedtime as needed for sleep.   1/8/2017 at hs     zolpidem (AMBIEN) 5 MG tablet Take 5 mg by mouth bedtime as needed for sleep.   1/8/2017 at hs     sildenafil (VIAGRA) 100 MG tablet Take 1 tablet (100 mg total) by mouth daily as needed. 12 tablet 5 Unknown        Review of Systems:  Review of systems not obtained due to inability to communicate with the patient.  Physical Exam:  Temp:  [97.9  F (36.6  C)-103.5  F (39.7  C)] (P) 100.7  F (38.2  C)  Heart Rate:  [] 99  Resp:  [28-48] 30  BP: (122-181)/() 122/59    Gen:Obtunted   HEENMT:PERRLA, extra ocular movement intact and icteric sclera  NEURO: moves all extremities; wakes to name.   CARDIOVASCULAR: S1, S2 tachy and regular, no murmur, rub or gallop  PULMONARY: Tachypneic; chest clear, no wheezing, rales, normal symmetric air entry; no retractions or cyanosis  GASTROINTESTINAL: midline incision clean and intact with staples; JPs x 2 with yellow fluid; active bowel sounds.   MSK: All joints are free of effusion and tenderness.   INTEGUMENT: jaundice  PSYCH: more alert today; answering questions      Pertinent Labs  personally reviewed.     Results from last 7  days  Lab Units 01/19/17  0601 01/18/17  0501 01/17/17  0442   LN-WHITE BLOOD CELL COUNT thou/uL 29.3* 14.7* 9.5   LN-HEMOGLOBIN g/dL 9.2* 8.5* 7.4*   LN-HEMATOCRIT % 28.0* 25.7* 22.6*   LN-PLATELET COUNT thou/uL 207 149 116*   LN-NEUTROPHILS RELATIVE PERCENT % 80* 71* 74*   LN-MONOCYTES RELATIVE PERCENT % 9 12* 13*         Results from last 7 days  Lab Units 01/19/17  0601 01/18/17  0501 01/17/17  0442   LN-SODIUM mmol/L 148*  148* 148* 146*  146*   LN-POTASSIUM mmol/L 3.6  3.6 3.6 3.9  3.9   LN-CHLORIDE mmol/L 117*  117* 117* 116*  116*   LN-CO2 mmol/L 24  24 24 23  24   LN-BLOOD UREA NITROGEN mg/dL 17  17 19 15  15   LN-CREATININE mg/dL 0.79  0.79 0.81 0.74  0.73   LN-CALCIUM mg/dL 8.1*  8.1* 8.2* 7.9*  7.9*   LN-ALBUMIN g/dL 2.7* 2.7* 2.8*  2.8*   LN-PROTEIN TOTAL g/dL 5.5* 5.0* 4.6*  4.8*   LN-BILIRUBIN TOTAL mg/dL 5.1* 4.2* 4.3*  4.3*   LN-ALKALINE PHOSPHATASE U/L 69 55 47  46   LN-ALT (SGPT) U/L 37 26 26   LN-AST (SGOT) U/L 108* 73* 71*  70*       MICROBIOLOGY DATA:  In process.       Pertinent Radiology  personally reviewed.     CT ABDOMEN/PELVIS WO ORAL WO IV CONTRAST  1/19/2017 10:08 AM     INDICATION: Abdominal pain, fever, abscess suspected.  TECHNIQUE: Routine CT abdomen and pelvis without oral or IV contrast. Multiplanar reformation images (MPR). Dose reduction techniques were used.  COMPARISON: 1/11/2017.     FINDINGS:  LUNG BASES: Dependent atelectasis bilaterally.     ABDOMEN: NG tube in the stomach. New surgical drains. Evacuation of previously seen hemoperitoneum. Hepatomegaly. No liver lesions on this noncontrast CT. Normal spleen, pancreas, adrenal glands, and right kidney. 3 mm nonobstructing left renal calculus.     PELVIS: Trace free pelvic fluid. Saeed catheter in the bladder.     MUSCULOSKELETAL: Negative.     IMPRESSION:   CONCLUSION:  1. No acute abnormality in the abdomen or pelvis.  2. Status post laparotomy with placement of surgical drains since the prior study.

## 2021-06-08 NOTE — PROGRESS NOTES
[facility] admissions: Reviewing readiness for BH daily. With restart of precedex, increased agitation and recurrent fevers will most likely not be ready for transfer until next week once showing improvement with above mentioned areas. Will continue to review daily. Patient continues to be appropriate for BH at this time. Thank you.    Dominga Duke RN Referral Specialist [phone]

## 2021-06-08 NOTE — PROGRESS NOTES
"Pharmacy Note: Total Parenteral Nutrition (TPN) Management    Pharmacist consulted to dose TPN for Babak Wolff, a 61 y.o. male by Dr. Shannon.    Subjective:    The patient is a new TPN start.    The patient was started on TPN in the hospital on 1/14/17.    Indication for TPN therapy: GI tract is not functional: Hemoperitoneum and Sepsis    Inadequate nutrition existing for > 7 days.     Enteral nutrition contraindicated due to: Hemoperitoneum.    Pertinent diseases and other special considerations hepatic failure    Social History   Substance Use Topics     Smoking status: Never Smoker     Smokeless tobacco: Former User     Types: Chew     Alcohol use 3.0 - 3.5 oz/week     6 - 7 drink(s) per week     Objective:    Height: 6' 3\" (1.905 m)    Weight: (!) 106.2 kg (234 lb 2.1 oz)    Body mass index is 29.26 kg/(m^2).    Patient Vitals for the past 96 hrs:   Weight   01/19/17 0030 (!) 106.2 kg (234 lb 2.1 oz)   01/18/17 0000 (!) 108.6 kg (239 lb 6.7 oz)   01/17/17 0100 (!) 110.8 kg (244 lb 4.3 oz)   01/16/17 0015 (!) 116.5 kg (256 lb 13.4 oz)       Labs:  Last 3 days:  Recent Labs      01/16/17   1601  01/16/17   2153   01/17/17   0442  01/18/17   0501  01/19/17   0601   NA  145   --    --   146*  146*  148*  148*  148*   K  3.7  3.7  3.8   --   3.9  3.9  3.6  3.6  3.6   CL  116*   --    --   116*  116*  117*  117*  117*   CO2  25   --    --   23  24  24  24  24   BUN  14   --    --   15  15  19  17  17   CREATININE  0.63*   --    --   0.74  0.73  0.81  0.79  0.79   GLU  175*   --    --   140*  147*  148*  144*  144*   CALCIUM  7.5*   --    --   7.9*  7.9*  8.2*  8.1*  8.1*   MG   --    --    --   1.8  1.8  2.1  2.1  2.1   PHOS   --    --    --   2.4*  2.3*  3.8  2.8  2.8   PROT   --    --    --   4.6*  4.8*  5.0*  5.5*   ALBUMIN   --    --    --   2.8*  2.8*  2.7*  2.7*   PREALBUMIN   --    --    --   5.8*   --    --    TRIG   --    --    --   102   --    --    HGB   --    --    --   7.4*  " 8.5*  9.2*   HCT   --    --    --   22.6*  25.7*  28.0*   PLT   --    --    --   116*  149  207   BILITOT   --    --    --   4.3*  4.3*  4.2*  5.1*   AST   --    --    --   71*  70*  73*  108*   ALT   --    --    --   26  26  37   ALKPHOS   --    --    < >  47  46  55  69   INR   --    --    --   1.68*   --    --     < > = values in this interval not displayed.       Fingerstick Blood Glucose (past 48 hours):   Recent Labs      01/17/17   2159  01/17/17   2347  01/18/17   0503  01/18/17   1211  01/18/17   1745  01/18/17 2007 01/18/17   2340  01/19/17   0329  01/19/17   0600  01/19/17   1111   POCGLUFGR  151  130  163  176  174  162  204  164  157  201       Intake/Output (last 24 hours): I/O last 3 completed shifts:  In: 4535.3 [I.V.:1557.3; IV Piggyback:1060]  Out: 6960 [Urine:1775; Emesis/NG output:2585; Drains:2600]    Estimated CrCl: Estimated Creatinine Clearance: 132.7 mL/min (by C-G formula based on Cr of 0.79).    Assessment:    Initiate patient on TPN therapy as a continuous central therapy.     Given the patient's current condition/oral intake, PN is still indicated.    Lab results reviewed: Electrolytes are near normal. Ionized calcium is normal. Sodium is still slightly elevated, will decrease in TPN. Patient is acidotic, so will maximize acetate in the TPN.    Blood glucose levels have been mid to upper 100s. Patient does have orders for Novolog and Lantus, will continue to manage BS with those - no insulin in TPN at this time    Plan:  1. Rate of TPN: 75 mL/hr. Maintenance IV fluid rate will be adjusted appropriately to meet the total maximum IV fluids rate as ordered by provider.  2. Formula:     Amino Acids 8 %    Dextrose 20 %    Sodium 60 mEq/L    Potassium 35 mEq/L    Magnesium 5 mEq/L    Calcium 4.5 mEq/L    Phosphorus 15 mMol/L    Chloride: Acetate Ratio = Max Acetate    Standard Multivitamins    Trace Elements    Famotidine 40 mg/day  3. Fat Emulsion: 250 ml, 4 times weekly on Sun, Tue,  Thu, and Sat. Propofol infusion has been discontinued.  4. Check CMP and electrolyte labs tomorrow as ordered.  5. Pharmacist will continue to follow the patient's lab results, clinical status and blood glucose results and make adjustments as appropriate.    Thank you for the consult.  Kathleen London, PharmD 1/19/2017 12:56 PM

## 2021-06-08 NOTE — PROGRESS NOTES
Stopped by to see patient, was in CT for emesis last night        Vitals:    01/19/17 0700 01/19/17 0730 01/19/17 0800 01/19/17 0900   BP: 152/62 132/61 122/59    Patient Position:       Pulse: (!) 106 (!) 101 99    Resp: (!) 32 (!) 29 (!) 30    Temp: (!) 100.9  F (38.3  C) 98.6  F (37  C)  (!) (P) 100.7  F (38.2  C)   TempSrc:  Axillary  (P) Rectal   SpO2: 96% 97% 97%    Weight:       Height:                   Results from last 7 days  Lab Units 01/19/17  0601   LN-WHITE BLOOD CELL COUNT thou/uL 29.3*   LN-HEMOGLOBIN g/dL 9.2*   LN-HEMATOCRIT % 28.0*   LN-PLATELET COUNT thou/uL 207         Results from last 7 days  Lab Units 01/19/17  0601   LN-SODIUM mmol/L 148*  148*   LN-POTASSIUM mmol/L 3.6  3.6   LN-CHLORIDE mmol/L 117*  117*   LN-CO2 mmol/L 24  24   LN-BLOOD UREA NITROGEN mg/dL 17  17   LN-CREATININE mg/dL 0.79  0.79   LN-CALCIUM mg/dL 8.1*  8.1*   LN-ALBUMIN g/dL 2.7*   LN-PROTEIN TOTAL g/dL 5.5*   LN-BILIRUBIN TOTAL mg/dL 5.1*   LN-ALKALINE PHOSPHATASE U/L 69   LN-ALT (SGPT) U/L 37   LN-AST (SGOT) U/L 108*         A/P:  Babak Wolff is s/p ligation of varix  -vitals, labs and CT scan reviewed  -no new recommendations  -will see patient tomorrow    Robert Hodge D.O. [facility]  [phone]  [facility] Department of Surgery

## 2021-06-08 NOTE — PROGRESS NOTES
[facility] Radiology Pre-Procedure Note  Date/Time: 1/9/2017/11:42 AM    Requested Procedure:  Visceral angiogram with embolization   Requested Provider:  Spencer Mayorga MD    HPI: Babak Wolff is a 61 y.o. male with 2-3 month H/O abdominal pain (lower pelvic region) and today had dizziness and syncopal episode and presented to ER. CTA today with findings of Large hemoperitoneum; bleeding source not identified. IR requested to perform visceral angiogram for further evaluation with possible embolization. HGB 7.1 and receiving PRBC, patient hemodynamically stable.    IMAGING:   CTA CHEST ABDOMEN PELVIS  1/9/2017 9:19 AM     INDICATION: Abdominal pain, tachycardia, hypotension. Evaluate for aortic dissection.  TECHNIQUE: Multiphase helical acquisition through the chest, abdomen, and pelvis was performed including noncontrast, arterial phase, and delayed images before and after the administration of IV contrast. 2D and 3D reconstructions were performed by the   CT technologist. Dose reduction techniques were used.   IV CONTRAST: Iohexol (Omni) 75mL  COMPARISON: None.     FINDINGS:  CT ANGIOGRAM CHEST, ABDOMEN, AND PELVIS: No evidence aortic dissection or aneurysm. Mild to moderate calcific arterial plaque. Coronary artery calcification.     CHEST:  LUNGS AND PLEURA: Dependent atelectasis in both lower lobes. Slight emphysema and mild fibrosis in the upper lobes. 5 mm circumscribed nodule right lower lobe on series 4 image 48. No pleural effusion.     MEDIASTINUM: Negative.     ABDOMEN: There is large amount of free fluid with likely clot along the spleen and left paracolic gutter, suggesting hemoperitoneum. There is some soft tissue stranding in the omental fat which could represent inflammatory change or a less likely   neoplastic infiltration.     There is heterogeneous decreased density within the liver suggesting diffuse parenchymal disease, and potentially heterogeneous steatosis. There is mild irregularity to  the contour of the liver and the a component of hepatic fibrosis or cirrhosis is also  possible. There are irregular hypodense foci within the right and left lobes which are of indeterminate etiology. Largest area measures approximately 1.5 x 3 cm.     Gallbladder, pancreas, spleen, adrenal glands, right kidney negative. There is likely a small accessory splenule adjacent to the spleen. 4 mm nonobstructing stone left kidney. Incidental retroaortic left renal vein.     There are few mildly dilated vessels near the stomach and pancreas and in the retroperitoneum on the left which could represent mild varices, there are also a few prominent lymph nodes in this region..     PELVIS: Large amount of free fluid with likely dependent clot.     Colonic diverticula, no diverticulitis. No abnormal bowel distention. Appendix is not identified.     MUSCULOSKELETAL: Degenerative changes in the spine and hips.     IMPRESSION:   CONCLUSION:  1. Large amount of hemoperitoneum. The bleeding source is not identified.  2. Abnormal liver suggesting diffuse parenchymal disease, and potentially steatosis, fibrosis, and/or mild cirrhosis. There are also focal hypodense lesions in liver which are indeterminate. MRI could assess further.  3. There are potentially some mild varices in the upper abdomen-retroperitoneum. However the spleen is not enlarged and the portal vein is not abnormally distended.  4. Soft tissue stranding in the omental fat probably inflammatory, neoplastic infiltration less likely but not excluded.  5. No evidence for aortic dissection or aneurysm.  6. 5 mm nodule right lower lobe. See below for follow-up guidelines.     Results were telephoned to the patient's physician AUGIE WINCHESTER at the time of this dictation.       NPO status:  Mn  Anticoagulation/Antiplatelets/Bleeding tendencies:  NONE  Antibiotics:  NONE for IR    PAST MEDICAL HISTORY:      Past Medical History   Diagnosis Date     Chronic abdominal pain   "    Insomnia        PAST SURGICAL HISTORY:  History reviewed. No pertinent past surgical history.    ALLERGIES:  Review of patient's allergies indicates no known allergies.    MEDICATIONS:  Current Facility-Administered Medications   Medication Dose Route Frequency Provider Last Rate Last Dose     naloxone injection 0.04-0.1 mg (NARCAN)  0.04-0.1 mg Intravenous PRN Spencer Mayorga MD        Or     naloxone injection 0.1-0.4 mg (NARCAN)  0.1-0.4 mg Intramuscular PRN Spencer Mayorga MD         sodium chloride 0.9 % flush 3 mL (NS)  3 mL Intravenous PRN Spencer Mayorga MD         sodium chloride 0.9% 0-1,000 mL  0-1,000 mL Intravenous PRN Spencer Mayorga MD   1,000 mL at 01/09/17 1002       LABS:    Lab Results   Component Value Date    INR 1.68 (H) 01/09/2017     Lab Results   Component Value Date    WBC 12.3 (H) 01/09/2017    HGB 7.1 (L) 01/09/2017    HCT 22.0 (L) 01/09/2017    MCV 91 01/09/2017     01/09/2017     Lab Results   Component Value Date    CREATININE 1.49 (H) 01/09/2017       EXAM:  Visit Vitals     /56 (Patient Position: Sitting)     Pulse (!) 106     Temp 98.4  F (36.9  C) (Oral)     Resp 19     Ht 6' 3\" (1.905 m)     Wt (!) 250 lb (113.4 kg)     SpO2 94%     BMI 31.25 kg/m2     General:  Stable.  In no acute distress.  Neuro:  A&O x 3.    Resp:  Lungs clear to auscultation bilaterally.  Cardio:  S1S2 and reg, without murmur, clicks or rubs.  Abdomen:  Slightly firm , distended, non-tender, positive bowel sounds.  Vascular:  +2/4 bilateral femoral pulses  Pre-Sedation Assessment:  Mallampati Airway Classification: Class 2: upper half of tonsil fossa visible  Previous reaction to anesthesia/sedation: no  Sedation plan based on assessment: Moderate  Sleep Apnea: no  Dentures: no  COPD: no  ASA Classification: ASA 3 - Patient with moderate systemic disease with functional limitations  2L o2 NC    ASSESSMENT:    Chronic abdominal pain with CTA finds of Large hemoperitoneum " today  Coagulopathy-elevated INR  Renal insuff    PLAN:    Dr. Renee aware of case  Visceral angiogram with possible embolization     The procedure, risks and moderate sedation were discussed with patient, all questions answered and patient agrees to proceed with the procedure.   Written consent obtained.    Angelina Lr, CNP

## 2021-06-08 NOTE — PROGRESS NOTES
[facility] Admissions: Patient/family transitioning to comfort cares. Will not continue to follow. Thank you.    Dominga uDke RN Referral Specialist [phone]
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
## 2021-06-08 NOTE — PROGRESS NOTES
Babak Wolff is slowly improving, more alert this a.m.        Vitals:    01/18/17 0500 01/18/17 0600 01/18/17 0700 01/18/17 0800   BP: 115/61 108/56 112/57 115/57   Patient Position:    Lying   Pulse: 89 84 93 92   Resp: (!) 39 (!) 38 (!) 40 (!) 41   Temp:    100.2  F (37.9  C)   TempSrc:    Oral   SpO2: 95% 90% 96% 95%   Weight:       Height:           PHYSICAL EXAM:  GEN: No acute distress, comfortable  ABD:distended, incision intact, carmela drains with ascites  EXT:No cyanosis, edema or obvious abnormalities          Results from last 7 days  Lab Units 01/18/17  0501   LN-WHITE BLOOD CELL COUNT thou/uL 14.7*   LN-HEMOGLOBIN g/dL 8.5*   LN-HEMATOCRIT % 25.7*   LN-PLATELET COUNT thou/uL 149         Results from last 7 days  Lab Units 01/18/17  0501   LN-SODIUM mmol/L 148*   LN-POTASSIUM mmol/L 3.6   LN-CHLORIDE mmol/L 117*   LN-CO2 mmol/L 24   LN-BLOOD UREA NITROGEN mg/dL 19   LN-CREATININE mg/dL 0.81   LN-CALCIUM mg/dL 8.2*   LN-ALBUMIN g/dL 2.7*   LN-PROTEIN TOTAL g/dL 5.0*   LN-BILIRUBIN TOTAL mg/dL 4.2*   LN-ALKALINE PHOSPHATASE U/L 55   LN-ALT (SGPT) U/L 26   LN-AST (SGOT) U/L 73*         A/P:  Babak Wolff is slowly progressing  -expect carmela drains to continue to drain ascites until liver stable  -staples to remain in place  -will follow    Robert Hodge D.O. FACS  [phone]  [facility] Department of Surgery

## 2021-06-08 NOTE — PROGRESS NOTES
Critical Care Progress Note  1/18/2017   1:01 PM    Admit Date: 1/9/2017  ICU Day: 9  Code Status: full code      Problem List:   Principal Problem:    Shock circulatory  Active Problems:    Hemorrhage, peritoneal    Acute blood loss anemia    Lactic acidosis    Alcohol withdrawal, uncomplicated    Respiratory failure, post-operative    Encephalopathy    Cognitive disorder    Compulsive behaviors          Plan:   1. Drains per surgery - still putting out quite a bit, nursing emptying Q1-h.   2.  gabapentin 250mg Q-8h down enteral tube.   3. Follow QTc; would avoid excessive use of Haldol and come down on Precedex as able. Scheduled Ativan. Appreciate Psych input.   4. Follow labwork. Transfuse hemoglobin < 7. Currently stable.   5. Supplemental oxygen as needed to maintain saturations > 92%  6. Continue ertapenem and add Vancomycin in setting of fever spike. Will also check UA/UC and Blood cultures. Send stool for C.diff.     ICU Prophylaxis   Feeding: NPO - TPN/lipids  Saeed: yes - keep in place for accurate I/O  Analgesia/Sedation: precedex  DVT prophylaxis: SCDs  HOB > 30 degrees: yes   GI Proph: pantoprazole   Glycemic Control: SSI Q6-h + lantus 10U BID   Family updated: brother at bedside    Dispo: ICU due to agitation requiring precedex infusion. [facility] following.     Joy Schwartz, [facility] Pulmonary/Critical Care    Total critical care time: 35-minutes  _______________________________________________________________    HPI: Babak Wolff is a 61 y.o. old male with a history of hypertension and alcohol use. He states that he's been dealing with on and off abdominal pain for the past 3 months. He describes the pain as sudden onset and his lower abdomen. The morning of admission, he had the same amount of pain and was also tachypneic, felt short of breath and when he tried to stand up he collapsed. No loss of consciousness.  Emergency room he had a CT of the abdomen that showed the large  "intraperitoneal hematoma. Hemoglobin was decreased from 12.5-7.1 compared to a few months ago. LFTs slightly elevated including bilirubin and his INR is 1.68. Contour of the liver was irregular.  Initially hypotensive with elevated lactic acid. After he received a liter of normal saline has blood pressure stabilized. After that 2 units of PRBCs were ordered..    Brief hospital summary:   1/9 Admission; transfused 2U PRBC. CIWA initiated for withdrawal.   1/10 Increasing shortness of breath due to ascites overload; had paracentesis with 7.5 liters removed.  1/11 Precedex added for agitation; MODESTA, hypotensive, hemoglobin drop to 6; transfused 4-units PRBC, 4U FFP, vitamin K;. KCentra given. CT demonstrated large volume hemoperitoneum. Taken to OR by Dr. Hodge - found bleeding right pericolic mesenteric varices. Returned to ICU intubated. Hypotensive, required Levophed and Vaso.   1/12 Abx changed to ertapenem due to question of diverticulitis  1/14 PICC Placed; large amount of fluid from ZACHARY drains - liters. Drains clamped.   1/15 Extubated  1/16 Psych consulted to help with behavior; QTc 500. ZACHARY drains unclamped - 4L removed before noon. TPN started.   1/17 Started stooling after no BM x 8 days  1/18 Fever spike to 101.3; Vancomycin added. Cultures requested.     ROS: answers \"no\" to pain/shortness of breath/nausea.     Events over last 24-hours: stable night in ICU. Continues to come down on Precedex.     Objective:     Vitals:    01/18/17 0700 01/18/17 0800 01/18/17 0900 01/18/17 1000   BP: 112/57 115/57 134/64 134/60   Patient Position:  Lying     Pulse: 93 92 (!) 107 (!) 107   Resp: (!) 40 (!) 41 (!) 52 (!) 47   Temp:  100.2  F (37.9  C)     TempSrc:  Oral     SpO2: 96% 95% 95% 94%   Weight:       Height:           Vent:   Day of Intubation: 1/11 - 15    Sedative Infusion: precedex  Sedation vacation: N/A    I/O:     Intake/Output Summary (Last 24 hours) at 01/18/17 1136  Last data filed at 01/18/17 1113   Gross " per 24 hour   Intake   2889 ml   Output   5170 ml   Net  -2281 ml     Wt Readings from Last 3 Encounters:   01/18/17 (!) 239 lb 6.7 oz (108.6 kg)   12/05/16 (!) 254 lb (115.2 kg)   12/30/15 (!) 256 lb 6.4 oz (116.3 kg)      Weight change: -4 lb 13.6 oz (-2.2 kg)    Physical Exam:  Gen: more awake today, looks uncomfortable  HEENMT:PERRLA, extra ocular movement intact and icteric sclera  NEURO: moves all extremities; wakes to name.   CARDIOVASCULAR: S1, S2 tachy and regular, no murmur, rub or gallop  PULMONARY: Tachypneic; chest clear, no wheezing, rales, normal symmetric air entry; no retractions or cyanosis  GASTROINTESTINAL: midline incision clean and intact with staples; JPs x 2 with yellow fluid; active bowel sounds.   INTEGUMENT: jaundice  PSYCH: more alert today; answering questions     Labs:     Results from last 7 days  Lab Units 01/18/17  0501   LN-SODIUM mmol/L 148*   LN-POTASSIUM mmol/L 3.6   LN-CHLORIDE mmol/L 117*   LN-CO2 mmol/L 24   LN-BLOOD UREA NITROGEN mg/dL 19   LN-CREATININE mg/dL 0.81   LN-CALCIUM mg/dL 8.2*   LN-PROTEIN TOTAL g/dL 5.0*   LN-BILIRUBIN TOTAL mg/dL 4.2*   LN-ALKALINE PHOSPHATASE U/L 55   LN-ALT (SGPT) U/L 26   LN-AST (SGOT) U/L 73*         Results from last 7 days  Lab Units 01/18/17  0501   LN-WHITE BLOOD CELL COUNT thou/uL 14.7*   LN-HEMOGLOBIN g/dL 8.5*   LN-HEMATOCRIT % 25.7*   LN-PLATELET COUNT thou/uL 149       Micro: no growth to date    Imaging: all imaging personalized reviewed   CXR 1/17 - Enteric tube entering the stomach. PICC line in the SVC. Mild infiltrate or atelectasis in both lung bases improved on the left side but slightly increased on the right side. Otherwise no change.      Joy Schwartz, [facility] Pulmonary/Critical Care

## 2021-06-08 NOTE — PROGRESS NOTES
General Surgery Progress Note  Hospital Day # 2    Subjective:   CC:intra-abdominal hemorrhage   Status:paracentesis yesterday for ascites overload and impending intubation, did well, then dropped pressures and hgb dropped to 6, no with increased hemoperitoneum on new ct this a.m., on precedex for withdrawal    Vitals:    01/11/17 0730 01/11/17 0745 01/11/17 0800 01/11/17 0830   BP: 97/52 118/57 109/54 107/53   Patient Position:   Lying    Pulse: 97 97 96 94   Resp: (!) 30 (!) 35 28 (!) 40   Temp: 97.4  F (36.3  C)  97.6  F (36.4  C)    TempSrc: Axillary  Axillary    SpO2:  94% 95% 96%   Weight:       Height:           Physical Exam:  General: NAD, pleasant  ABD: distended, soft, ttp  EXT:no CCE      Results from last 7 days  Lab Units 01/11/17  0445   LN-WHITE BLOOD CELL COUNT thou/uL 18.3*   LN-HEMOGLOBIN g/dL 6.9*   LN-HEMATOCRIT % 20.4*   LN-PLATELET COUNT thou/uL 188         Results from last 7 days  Lab Units 01/11/17  0445 01/10/17  0432   LN-SODIUM mmol/L 126* 133*   LN-POTASSIUM mmol/L 4.9 4.9   LN-CHLORIDE mmol/L 102 106   LN-CO2 mmol/L 19* 19*   LN-BLOOD UREA NITROGEN mg/dL 32* 20   LN-CREATININE mg/dL 1.94* 1.14   LN-CALCIUM mg/dL 7.4* 7.8*   LN-ALBUMIN g/dL  --  2.6*   LN-PROTEIN TOTAL g/dL  --  5.8*   LN-BILIRUBIN TOTAL mg/dL  --  3.7*   LN-ALKALINE PHOSPHATASE U/L  --  69   LN-ALT (SGPT) U/L  --  49*   LN-AST (SGOT) U/L  --  242*       Assessment: cirrhosis, hemoperitoneum worse    Plan: unfortunately hgb dropped again, may have been post procedural or repeat bleeding form original source  -CT clearly worse with blood  -K centra given  -repeat h/h  -will plan for emergent exploration to evaluate for source of bleeding- risks and benefits of surgery were discussed with significant other and she understands the risks including heart attack, stroke, death.  Additionally, we may not find a source of bleeding.  Because this is an emergent procedure and he is not alert, consent was obtained by his  significant other.  There is no power of  at this point and delay is not in the patients best interest as he will continue to decline and die.      Silvio Hodge DO [facility] Surgery  [phone]

## 2021-06-08 NOTE — PROGRESS NOTES
Babak Wolff is s/p ex lap, evacuation of hemoperitoneum and ligation of varix.  He is stable, h/h stable, on pressors        Vitals:    01/12/17 0800 01/12/17 0815 01/12/17 0830 01/12/17 0845   BP: 134/50      Patient Position: Lying      Pulse: 74 74 74 75   Resp: 17 16 17 17   Temp: (!) 100.6  F (38.1  C)      TempSrc: Oral      SpO2: 96% 95% 96% 96%   Weight:       Height:           PHYSICAL EXAM:  GEN: No acute distress, intbated  ABD:soft, dressings clear, carmela drains serosanguinous  EXT:No cyanosis, edema or obvious abnormalities          Results from last 7 days  Lab Units 01/12/17  0757 01/12/17  0412   LN-WHITE BLOOD CELL COUNT thou/uL  --  21.2*   LN-HEMOGLOBIN g/dL 8.5* 8.7*   LN-HEMATOCRIT %  --  25.4*   LN-PLATELET COUNT thou/uL  --  174         Results from last 7 days  Lab Units 01/12/17  0411   LN-SODIUM mmol/L 133*   LN-POTASSIUM mmol/L 4.1   LN-CHLORIDE mmol/L 110*   LN-CO2 mmol/L 19*   LN-BLOOD UREA NITROGEN mg/dL 27*   LN-CREATININE mg/dL 1.22   LN-CALCIUM mg/dL 7.1*   LN-ALBUMIN g/dL 2.6*   LN-PROTEIN TOTAL g/dL 4.1*   LN-BILIRUBIN TOTAL mg/dL 5.0*   LN-ALKALINE PHOSPHATASE U/L 50   LN-ALT (SGPT) U/L 47*   LN-AST (SGOT) U/L 228*         A/P:  Babak Wolff is s/p ligation of mesenteric varix  -h/h stable  -wean pressors  -no further bleeding at present  -expect ascites to accumulate and increase via carmela drains over the next several days to weeks  -coagulopathy nearly corrected    Robert Hodge D.O. FACS  [phone]  [facility] Department of Surgery

## 2021-06-08 NOTE — PROGRESS NOTES
Spoke with Significant other regarding patient's progress, and our plan of care.  She felt she was unaware of his liver failure status.  I supported her through the conversation.  I did confirm patient status with Dr. Milner, she did agree.  Will have another conversation with family tomorrow.  Lyubov Lopez

## 2021-06-08 NOTE — PROGRESS NOTES
Febrile - cultured. Will start acetaminophen at low dose, will keep him less than 2 gr per day as recommended for chronic liver disease patients

## 2021-06-08 NOTE — PROGRESS NOTES
"Clinical Nutrition Therapy Assessment Note -Parenteral Assessment    [place] was eating, s/p or  See originanl assessment    Current Nutrition Prescription:   Diet: NPO   Day 5  Supplements and Modulars:   Flush Orders:   Propofol Orders: prof. Did get started, so lipids will be held until off profofol  IV dextrose or Fluids:   nacl 0.9%       Current Nutrition Intake:  D/W pharm and MD  RD can have 75cc/hr for goal rate of TPN to meet nutrition needs MD also ok with 7% AA    Will start D20 7AA @ goal rate of 75cc/hr with 20% IL 4 times per week(currently on hold due to prof.  )    Parenteral nutrition access is a centeral line. The current nutritonal orde @ goal rate r will provide, 1656  Plus prof.  kcals, 108 grams protein, 360 grams carbohydrate, , and 1800 mls fluid daily    Total nutrient intake from all sources meets estimated nutritional needs.    Anthropometrics:  Height: 75\"  Admission weight: 114.2 kg  Most recent weight: 116.3 kg  BMI:BMI (Calculated): 32.1  BMI indication: 30-34.9 obesity (class 1)  Ideal body weight 78 - 92 kg  Adj wt used to assess needs  88 kg  Weight History: stable    Physical Findings:  The patient has the following physical signs which could indicate malnutrition: reduced  strength       GI Status/Output:   The patient's GI symptoms include: abdominal distention    Bowel Sounds hypoactive    Skin/Wound:14Braden score      Medications:  Medications reviewed.    Labs:  Labs reviewed ast  111    Assessed Nutritional Needs:  Assessment weight is 88kg, with a weight source of adjusted    Estimated Energy Needs: 2853-5937 kcals (22-25 kcal/kg [facility] Obesity Guidlines    Estimated Protein Needs: 105-132 gm (1.2-1.5 gm/kg)    Estimated Fluid Needs: per MD    Malnutrition Assessment:  Pt  Does not have any qualifying criteria at this time    Nutrition Risk Level: stable-high risk     Nutrition Dx   Inadequate oral po intake r/t recent OR and no bowel sounds evidenced by " NPO 5 days and start of TPNgoals  Adequate nutrition via most appropriate route  TPN to start   to meet needs   Intervention:TPN of D20, 6AA @ 75 with 20 % IL 4 times per week  ( lipids on hold to start as pt got started on prof.  Hopefully off soon )    Monitoring  Tolerance to tpn, adjust formula as needed  Follow for start of po    See Care Plan for further Problems, Goals, and Interventions.

## 2021-06-08 NOTE — PROGRESS NOTES
General Surgery Progress Note    6 Days Post-Op    EXPLORATORY LAPAROTOMY, EVACUATION OF HEMAPERITONEUM and ASCITES, OVERSEWING MESENTERIC VARIX    Subjective:   Pt is feeling less confused, denies pain, appears comfortable. Answering questions appropriately when prompted, was able to say he was at a hospital and was at [place] and thanked me for stopping by.  Does not appear oriented to the situation however.   No agitation noted. Family at bedside.        Vitals:    01/17/17 0900 01/17/17 1000 01/17/17 1100 01/17/17 1200   BP: 99/50 92/50 91/52 91/58   Patient Position:       Pulse: 79 61 60 65   Resp: 27 23 24 (!) 29   Temp:       TempSrc:       SpO2: 95% 94% 95% 96%   Weight:       Height:           Physical Exam:    Ab:       distended, + BS, large vertical incision closed with staples - intact, no s/s of infection. 2 JPs in place with serous drainage - continues to have large amount of output, 9,055 over the last 24 hrs.    Results from last 7 days  Lab Units 01/17/17  0442   LN-WHITE BLOOD CELL COUNT thou/uL 9.5   LN-HEMOGLOBIN g/dL 7.4*   LN-HEMATOCRIT % 22.6*   LN-PLATELET COUNT thou/uL 116*         Results from last 7 days  Lab Units 01/17/17  0442   LN-SODIUM mmol/L 146*  146*   LN-POTASSIUM mmol/L 3.9  3.9   LN-CHLORIDE mmol/L 116*  116*   LN-CO2 mmol/L 23  24   LN-BLOOD UREA NITROGEN mg/dL 15  15   LN-CREATININE mg/dL 0.74  0.73   LN-CALCIUM mg/dL 7.9*  7.9*   LN-ALBUMIN g/dL 2.8*  2.8*   LN-PROTEIN TOTAL g/dL 4.6*  4.8*   LN-BILIRUBIN TOTAL mg/dL 4.3*  4.3*   LN-ALKALINE PHOSPHATASE U/L 47  46   LN-ALT (SGPT) U/L 26   LN-AST (SGOT) U/L 71*  70*       Assessment:  Slightly improved from the previous days.  Patient is calm and able to have short conversations.  Denies pain.      Plan:   -Continue to leave ZACHARY drains open  -Discussed with family it may take weeks for the liver to start to recover and for the amount of output to decrease.  -leave staples in place  -remains ill but continuing  to improve    Gm Jurado CNP  [phone] -  office  [phone] - pager  [facility] Surgery

## 2021-06-08 NOTE — PROGRESS NOTES
General Surgery Progress Note    2 Days Post-Op    EXPLORATORY LAPAROTOMY, EVACUATION OF HEMAPERITONEUM and ASCITES, OVERSEWING MESENTERIC VARIX    Subjective:   Babak Wolff is POD #2  s/p ex lap, evacuation of hemoperitoneum and ligation of varix. He is stable, h/h stable, on pressors, fentanyl, ativan prn.        Vitals:    01/13/17 1145 01/13/17 1200 01/13/17 1215 01/13/17 1230   BP:       Patient Position:       Pulse: 61 63 63 60   Resp: 14 14 14 14   Temp:  98.7  F (37.1  C)     TempSrc:  Oral     SpO2: 96% 100% 98% 98%   Weight:       Height:           Physical Exam:  Gen:  No acute distress, intubated.  Ab:       Distended, BS absent, ZACHARY drains with large amount (increased from yesterday) serosanguinous output.      Results from last 7 days  Lab Units 01/13/17  0400   LN-WHITE BLOOD CELL COUNT thou/uL 15.4*   LN-HEMOGLOBIN g/dL 7.9*   LN-HEMATOCRIT % 23.1*   LN-PLATELET COUNT thou/uL 146         Results from last 7 days  Lab Units 01/13/17  0400   LN-SODIUM mmol/L 134*   LN-POTASSIUM mmol/L 4.0   LN-CHLORIDE mmol/L 111*   LN-CO2 mmol/L 20*   LN-BLOOD UREA NITROGEN mg/dL 26*   LN-CREATININE mg/dL 0.84   LN-CALCIUM mg/dL 7.0*   LN-ALBUMIN g/dL 2.2*   LN-PROTEIN TOTAL g/dL 4.0*   LN-BILIRUBIN TOTAL mg/dL 5.4*   LN-ALKALINE PHOSPHATASE U/L 49   LN-ALT (SGPT) U/L 46*   LN-AST (SGOT) U/L 166*       Assessment/plan: s/p ligation of mesenteric varix  H/h stable  Wean pressors as able  Does not appear to have further bleeding  Increasing ZACHARY output as expected      Gm Jurado CNP  [phone] -  office  [phone] - pager  [facility] Surgery    I have seen and examined the patient.  I have reviewed and agree with the note written by the NP/PA team member.     Remains intubated on pressors  Abdomen soft, ZACHARY drains with ascitic fluid, no further bleeding  Will continue to follow    Silvio Hodge D.O. [facility]  [phone]  [facility] Department of Surgery

## 2021-06-08 NOTE — PROGRESS NOTES
Chart checking. Patient with alcohol abuse who presented with hemoperitoneum requiring exploratory laparotomy and ligation of bleeding mesenteric varix.S/P multiple units of pRBCs, FFP transfusion. Extubated on 1.15. Patient needed to go back on precedex . Remains encephalopathic. OnTPN. On ertapenem and vancomycin. Continued to have fevers. Micafungin added. ID consulted. ICU team, ID and psych following. Nothing much to offer from HMS standpoint. Will follow the patient peripherally as long as the patient needs ICU care.

## 2021-06-08 NOTE — PROGRESS NOTES
Infectious Disease Follow up Note:    Service: Infectious Disease   Note: Follow Up Note  Date: 1/22/2017    Chief Complaint: Follow up for fever and leukocytosis.     ASSESSMENT:  Babak Wolff is a 61 y.o. 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     Alcoholic hepatitis and hematoma are associated with leukocytosis    Very poor prognosis and not a candidate for liver transplant due to active alcohol abuse.     Superficial thrombi in UE    Principal Problem:  Shock circulatory  Active Problems:  Hemorrhage, peritoneal  Acute blood loss anemia  Lactic acidosis  Alcohol withdrawal, uncomplicated  Respiratory failure, post-operative  Encephalopathy  Cognitive disorder  Compulsive behaviors     No clear new source of infection at this time. At high risk for fungal infection given TPN, PICC and ESLD. Repeat CT recently not concerning for new infection. Fever and WBC improving on Meropenem + Vancomycin + Micafungin. Mental status fluctuates.       Recommendations:  Continue  Micafungin for now. Stop meropenem on 1/22/17. Deescalating antibiotics as cultures have remained negative (Antibiotic Time out)  Stopped vancomycin on 1/21/17  Follow pending cultures and adjust therapy with cultures.   Discussed with Pharm D-- appreciate input. Vancomycin level was supratherapeutic  Discussed with Pulmonary and Critical Care attending, Dr. Milner. Appreciate input    Prognosis remains very poor. DNR/DNI. Anticipating discharge on hospice.  Discussed with patient's family at bedside.   Discussed with nursing and Pharm D.  ID will follow.    Essence Putnam MD  [facility]   [phone] Option-2    ______________________________________________________________________  Notes / labs / vitals reviewed.    SUBJECTIVE / INTERVAL HISTORY: confused. Able to answer some questions today.    In restraints.     ROS: A  "complete 12 point ROS is negative except as listed above.    PMHx/FHx/SHx: Reviewed. Interval changes noted.    OBJECTIVE:  Vitals:    17 1200   BP: 141/68   Pulse: (!) 125   Resp: (!) 30   Temp:    SpO2: 93%       Temp (24hrs), Av.2  F (37.3  C), Min:98.2  F (36.8  C), Max:100.3  F (37.9  C)    Estimated body mass index is 28.02 kg/(m^2) as calculated from the following:    Height as of this encounter: 6' 3\" (1.905 m).    Weight as of this encounter: 224 lb 3.3 oz (101.7 kg).      Gen: awake delirious  HEENMT:PERRLA, extra ocular movement intact and icteric sclera  NEURO: moves all extremities; wakes to name.   CARDIOVASCULAR: S1, S2 tachycardia  PULMONARY: Tachypneic; chest clear, no wheezing anteriorly, decreased at bases  GASTROINTESTINAL: midline incision clean and intact with staples; JPs x 2 with yellow fluid; firm, tenderness  MSK: All joints no effusion and tenderness.   INTEGUMENT: jaundice  PSYCH: delirium, in restraints    Antibiotics:  Meropenem stopped on 17  Vancomycin stopped on 17  Micafungin     Pertinent labs:      Results from last 7 days  Lab Units 17  0629 17  0619 17  0426 17  0601   LN-WHITE BLOOD CELL COUNT thou/uL 20.3* 21.0* 23.2* 29.3*   LN-HEMOGLOBIN g/dL 8.5* 8.5* 8.4* 9.2*   LN-HEMATOCRIT % 26.3* 25.8* 25.6* 28.0*   LN-PLATELET COUNT thou/uL 165 162 164 207   LN-NEUTROPHILS RELATIVE PERCENT % 78* 79*  --  80*   LN-MONOCYTES RELATIVE PERCENT % 8 8  --  9   LN-MONOCYTES - REL (DIFF) %  --   --  9  --          Results from last 7 days  Lab Units 17  0629 17  0619 17  1544 17  0426   LN-SODIUM mmol/L 146* 143  143  --  146*   LN-POTASSIUM mmol/L 3.7 3.6  3.6 3.1* 3.3*   LN-CHLORIDE mmol/L 113* 112*  112*  --  114*   LN-CO2 mmol/L 25 25  25  --  25   LN-BLOOD UREA NITROGEN mg/dL 25* 20  20  --  22   LN-CREATININE mg/dL 0.95 0.90  0.90  --  0.84   LN-CALCIUM mg/dL 8.1* 8.1*  8.1*  --  7.9*   LN-ALBUMIN g/dL 2.4* 2.2*  " --  2.4*   LN-PROTEIN TOTAL g/dL 5.9* 5.4*  --  5.2*   LN-BILIRUBIN TOTAL mg/dL 5.6* 5.3*  --  4.3*   LN-ALKALINE PHOSPHATASE U/L 66 74  --  65   LN-ALT (SGPT) U/L 59* 62*  --  41   LN-AST (SGOT) U/L 143* 165*  --  124*       MICROBIOLOGY DATA:    Cultures reviewed    Culture, Blood Anaerobic Bottle [[id number]] Collected: 01/21/17 1102        Order Status: Sent Specimen: Blood Updated: 01/21/17 1106       Culture, Blood Aerobic Bottle [[id number]] Collected: 01/21/17 1102       Order Status: Sent Specimen: Blood Updated: 01/21/17 1106       Culture, Blood Anaerobic Bottle [[id number]] Collected: 01/21/17 0842       Order Status: Sent Specimen: Blood from Central Venous Line Updated: 01/21/17 0857       Culture, Blood Aerobic Bottle [[id number]] Collected: 01/21/17 0842       Order Status: Sent Specimen: Blood from Central Venous Line Updated: 01/21/17 0857       Culture, Blood Anaerobic Bottle [[id number]] (Normal) Collected: 01/15/17 1416       Order Status: Completed Specimen: Blood Updated: 01/20/17 1809        Anaerobic Blood Culture Bottle No Growth        Culture, Blood Aerobic Bottle [[id number]] (Normal) Collected: 01/15/17 1416       Order Status: Completed Specimen: Blood Updated: 01/20/17 1809        Aerobic Blood Culture Bottle No Growth        Culture, Blood Anaerobic Bottle [[id number]] Collected: 01/19/17 1257       Order Status: Sent Specimen: Blood Updated: 01/19/17 1330       Culture, Blood Aerobic Bottle [[id number]] Collected: 01/19/17 1258       Order Status: Sent Specimen: Blood from Venipuncture: Arm, Left Updated: 01/19/17 1329       Culture, Fungus, Blood [[id number]] Collected: 01/19/17 1258       Order Status: Sent Specimen: Blood Updated: 01/19/17 1327       Culture, Blood Anaerobic Bottle [[id number]] Collected: 01/19/17 1251       Order Status: Sent Specimen: Blood from Venipuncture: Arm, Left Updated: 01/19/17 1305       Culture, Blood Aerobic Bottle [[id number]] Collected: 01/19/17 1251       Order  Status: Sent Specimen: Blood Updated: 01/19/17 1305       Culture, Urine [[id number]] Collected: 01/18/17 1259       Order Status: Completed Specimen: Urine Updated: 01/19/17 1209        Culture No Growth         IMAGING/RADIOLOGY:  Reviewed    CT ABDOMEN/PELVIS WO ORAL WO IV CONTRAST  1/19/2017 10:08 AM     INDICATION: Abdominal pain, fever, abscess suspected.  TECHNIQUE: Routine CT abdomen and pelvis without oral or IV contrast. Multiplanar reformation images (MPR). Dose reduction techniques were used.  COMPARISON: 1/11/2017.     FINDINGS:  LUNG BASES: Dependent atelectasis bilaterally.     ABDOMEN: NG tube in the stomach. New surgical drains. Evacuation of previously seen hemoperitoneum. Hepatomegaly. No liver lesions on this noncontrast CT. Normal spleen, pancreas, adrenal glands, and right kidney. 3 mm nonobstructing left renal calculus.     PELVIS: Trace free pelvic fluid. Saeed catheter in the bladder.     MUSCULOSKELETAL: Negative.     IMPRESSION:   CONCLUSION:  1. No acute abnormality in the abdomen or pelvis.  2. Status post laparotomy with placement of surgical drains since the prior study.

## 2021-06-08 NOTE — PROGRESS NOTES
NG drained 700ccs dk green drng--lightening to a swamp green this am. UO--450ccs felisa/orange. ZACHARY-675 yellow drng. Gifty Daily RN

## 2021-06-08 NOTE — PROGRESS NOTES
Babak Wolff is slowly improving.  He remains confused but is able to communicate to some degree        Vitals:    01/20/17 1744 01/20/17 1820 01/20/17 1835 01/20/17 1900   BP:  162/71 134/62 139/66   Patient Position:       Pulse: (!) 121 (!) 122 (!) 101 (!) 104   Resp: 28 (!) 30 28 26   Temp: 98.3  F (36.8  C) 98.4  F (36.9  C)     TempSrc: Axillary Axillary     SpO2: 95% 94% 95% 96%   Weight:       Height:           PHYSICAL EXAM:  GEN: No acute distress, comfortable  ABD: Distended with ascites, incision intact, ZACHARY drains with ascites   EXT:No cyanosis, edema or obvious abnormalities          Results from last 7 days  Lab Units 01/20/17  0426   LN-WHITE BLOOD CELL COUNT thou/uL 23.2*   LN-HEMOGLOBIN g/dL 8.4*   LN-HEMATOCRIT % 25.6*   LN-PLATELET COUNT thou/uL 164         Results from last 7 days  Lab Units 01/20/17  1544 01/20/17  0426   LN-SODIUM mmol/L  --  146*   LN-POTASSIUM mmol/L 3.1* 3.3*   LN-CHLORIDE mmol/L  --  114*   LN-CO2 mmol/L  --  25   LN-BLOOD UREA NITROGEN mg/dL  --  22   LN-CREATININE mg/dL  --  0.84   LN-CALCIUM mg/dL  --  7.9*   LN-ALBUMIN g/dL  --  2.4*   LN-PROTEIN TOTAL g/dL  --  5.2*   LN-BILIRUBIN TOTAL mg/dL  --  4.3*   LN-ALKALINE PHOSPHATASE U/L  --  65   LN-ALT (SGPT) U/L  --  41   LN-AST (SGOT) U/L  --  124*         A/P:  Babak Wolff is s/p ligation of intra-abdominal varices.  -Slow to progress  -Continues high ascites output  -No surgery at this point  -Staples to remain in for now  -Will continue to follow    Robert Hodge D.O. FACS  [phone]  [facility] Department of Surgery

## 2021-06-08 NOTE — PROGRESS NOTES
PULMONARY / CRITICAL CARE PROGRESS NOTE    Date / Time of Admission:  1/9/2017  7:44 AM    Assessment:   Principal Problem:  Shock circulatory  Active Problems:  Hemorrhage, peritoneal  Lactic acidosis  Probable liver cirrhosis due to alcohol use  Acute blood loss anemia  Coagulopathy  MODESTA  RLL nodule, 5mm   Alcohol withdrawal    Advance Directives:  full    Plan:   Neuro:  Alcohol withdrawal. On CIWA. Highest score was 12. Continue Ativan PRN and MVI.     CVS:  Hypertensive today. Started on amlodipine  Will give one dose of lasix    Pulm:  No issues    GI:  Intraperitoneal hemorrhage, spontaneous - angio was negative  Plan is to monitor. Hgb every 4 hours. Control BP  Alcoholic liver cirrhosis    ID:  Leukocytosis - probably reactive. No fever. Will check procalcitonin  Add rocephin. Not sure if a cirrhotic patient with ascites requires preventative tx like we do for GI bleeding. Will do literature search      Critical Care Time greater than: 45 Minutes  Total time spent with patient greater than: 45 Minutes    Subjective:   HPI: Babak Wolff is a 61 y.o. old male with a history of hypertension and alcohol use. He states that he's been dealing with on and off abdominal pain for the past 3 months. He describes the pain as sudden onset and his lower abdomen. This morning he had the same amount of pain particularly worse also tachypneic, felt short of breath and when he tried to stand up he collapsed. She doesn't think she lost consciousness and he states that he just got weak and his knees.  He states that his bili is only somewhat distended but it felt more firm today  Emergency room he had a CT of the abdomen that showed the large intraperitoneal hematoma. Hemoglobin was decreased from 12.5-7.1 compared to a few months ago. LFTs slightly elevated including bilirubin and his INR is 1.68. Contour of the liver was irregular.  Initially hypotensive with elevated lactic acid. After he received a liter of normal  saline has blood pressure stabilized. After that 2 units of PRBCs were ordered.    Principal Problem:    Shock circulatory  Active Problems:    Hemorrhage, peritoneal    Acute blood loss anemia    Lactic acidosis      Allergies: No Known Allergies     [facility]:  [email or web address]      Current Facility-Administered Medications:      amLODIPine tablet 10 mg (NORVASC), 10 mg, Oral, DAILY, Chi Watson MD, 10 mg at 01/10/17 0938     benzocaine-menthol lozenge 1 lozenge (CEPACOL), 1 lozenge, Oral, Q1H PRN, Chi Watson MD     bisacodyl suppository 10 mg (DULCOLAX), 10 mg, Rectal, Daily PRN, Chi Watson MD     dextrose 50 % (D50W) syringe 20-50 mL, 20-50 mL, Intravenous, PRN, Joy Delgado CNP     folic acid tablet 1 mg (FOLVITE), 1 mg, Oral, DAILY, 1 mg at 01/10/17 0803 **OR** folic acid injection 1 mg, 1 mg, Intramuscular, DAILY, Chi Watson MD     furosemide injection 20 mg (LASIX), 20 mg, Intravenous, Once, Chi Watson MD     gabapentin capsule 100-300 mg (NEURONTIN), 100-300 mg, Oral, Q6H PRN, Chi Watson MD, 300 mg at 01/10/17 0834     glucagon (human recombinant) injection 1 mg, 1 mg, Subcutaneous, PRN, Joy Delgado CNP     insulin aspart injection (NovoLOG), , Subcutaneous, Q4H FIXED TIMES, Chi Watson MD, 2 Units at 01/10/17 0809     LORazepam tablet 2 mg (ATIVAN), 2 mg, Oral, Q30 Min PRN, 2 mg at 01/10/17 0833 **OR** LORazepam injection 2 mg (ATIVAN), 2 mg, Intravenous, Q30 Min PRN **OR** LORazepam injection 2 mg (ATIVAN), 2 mg, Intramuscular, Q30 Min PRN, Chi Watson MD     magnesium hydroxide suspension 30 mL (MILK OF MAG), 30 mL, Oral, Daily PRN, Chi Watson MD     magnesium sulfate in water 2 gram/50 mL (4 %) IVPB 2 g, 2 g, Intravenous, Q6H, Joy Delgado, CNP, Last Rate: 25 mL/hr at 01/10/17 0802, 2 g at 01/10/17 0802     multivitamin therapeutic tablet 1 tablet (THERAGRAN), 1 tablet, Oral, DAILY, Chi Watson MD,  "1 tablet at 01/10/17 0803     naloxone injection 0.04-0.1 mg (NARCAN), 0.04-0.1 mg, Intravenous, PRN **OR** naloxone injection 0.1-0.4 mg (NARCAN), 0.1-0.4 mg, Intramuscular, PRN, Spencer Mayorga MD     omeprazole capsule 20 mg (PriLOSEC), 20 mg, Oral, Daily before brkfst, Chi Watson MD     ondansetron injection 4 mg (ZOFRAN), 4 mg, Intravenous, Q4H PRN **OR** ondansetron tablet 8 mg (ZOFRAN), 8 mg, Oral, Q8H PRN, Chi Watson MD     oxyCODONE immediate release tablet 5 mg (ROXICODONE), 5 mg, Oral, Q4H PRN, Chi Watson MD, 5 mg at 01/10/17 0449     phytonadione (vitamin K) 10 mg in dextrose 5% 50 mL (AQUA-MEPHYTON), 10 mg, Intravenous, Daily 0800, Joy Delgado, CNP, Last Rate: 100 mL/hr at 01/10/17 0806, 10 mg at 01/10/17 0806     polyvinyl alcohol 1.4 % ophthalmic solution 1-2 drop (LIQUIFILM TEARS), 1-2 drop, Both Eyes, Q1H PRN, Chi Watson MD     senna-docusate 8.6-50 mg tablet 1 tablet (PERICOLACE), 1 tablet, Oral, BID, 1 tablet at 01/10/17 0804 **OR** sennosides syrup 8.8 mg (for SENOKOT), 8.8 mg, Enteral Tube, BID, Chi Watson MD     Insert peripheral IV, , , Once **AND** Saline lock IV, , , Once **AND** sodium chloride 0.9 % flush 3 mL (NS), 3 mL, Intravenous, PRN, Spencer Mayorga MD     sodium chloride 0.9%, 25 mL/hr, Intravenous, Continuous, Joy Delgado, CNP, Last Rate: 25 mL/hr at 01/09/17 2015, 25 mL/hr at 01/09/17 2015     thiamine tablet 100 mg, 100 mg, Oral, DAILY, 100 mg at 01/10/17 0803 **OR** thiamine injection 100 mg (vitamin B1), 100 mg, Intramuscular, DAILY, Chi Wtason MD    Objective:   VITALS:    Visit Vitals     /83     Pulse (!) 109     Temp 98  F (36.7  C)     Resp 28     Ht 6' 3\" (1.905 m)     Wt (!) 263 lb 4.8 oz (119.4 kg)     SpO2 91%     BMI 32.91 kg/m2     EXAM:  General appearance: alert, appears stated age and cooperative  Head: Normocephalic, without obvious abnormality, atraumatic  Lungs: clear to " auscultation bilaterally  Heart: regular rate and rhythm, S1, S2 normal, no murmur, click, rub or gallop  Abdomen: distended. Some tenderness in lower abdomen.   Extremities: trace edema  Neurologic: Grossly normal    I&O:    Intake/Output Summary (Last 24 hours) at 01/10/17 1143  Last data filed at 01/10/17 0833   Gross per 24 hour   Intake   3533 ml   Output    925 ml   Net   2608 ml       Data Review:  CBC:   Lab Results   Component Value Date    WBC 19.2 (H) 01/10/2017    WBC 6.1 06/16/2015    RBC 3.00 (L) 01/10/2017     BMP:   Lab Results   Component Value Date    CO2 19 (L) 01/10/2017    BUN 20 01/10/2017    CREATININE 1.14 01/10/2017    CALCIUM 7.8 (L) 01/10/2017     Serum Glucose range:No results for input(s): POCGLU in the last 72 hours.

## 2021-06-08 NOTE — PROGRESS NOTES
Hospital Medicine Progress Note    Assessment/Plan  Babak Wolff is a 61 y.o. male with alcoholic liver cirrhosis, low back pain presented for evaluation of syncope found to have hemorrhagic shock due to hemoperitoneum from ruptured mesenteric varix.    Comfort cares status.    Ruptured mesenteric varix resulting in hypovolemic shock: Presented with syncope, found to be anemic with ascites and large intraabdominal bleed. IR angiography was attempted, no bleed source found. Bleed seemed to have stopped spontaneously. He was stable in the ICU. He underwent paracentesis due to tense abdomen, then dropped his Hgb and was found to have increased hemoperitoneum on CT. Went to OR 1/11 for ex lap and found to have large bleeding pericolic varix which was oversewn. He was in the ICU on pressors for a few days after surgery and received a total of 16 units of pRBCs.   - Staples very itchy to him. Incision healing ok. Authorized RN to remove alternating staples and place steris. Apply topical petroleum jelly PRN.    Fever, leukocytosis: There was question of diverticulitis, of course he is also at risk for SBP. Despite maximal therapy he continued to have fevers and leukocytosis. Decision was made for comfort cares transitioning to hospice.   - No more antibiotics    Large volume ascites: He has 2 ZACHARY drains in place to take tension off abdominal wall so that laparotomy incision can heal. These will remain in place indefinitely    Alcoholism with withdrawal: Acute alcohol withdrawal resolved. He had to be sedated with precedex, intubated for several days in ICU. Extubated 1/15. He did need precedex for several more days for agitation, now off since 1/19.    Agitation: Likely due to acute illness, hepatic encephalopathy. Has required precedex for several days. Now with scheduled gabapentin, olanzapine is doing ok and able to have nice visit with family members today.    Nutrition: He received TPN for several days, now  "off.    FENGI: Regular diet ad clarence    Disposition: Home with Hospice in 1-2 days versus hospice facility  Barriers to discharge: Starting to take PO, removing 1/2 of staples, transfer to floor  Code status: DNR    Principal Problem:    Shock circulatory  Active Problems:    Hemorrhage, peritoneal    Acute blood loss anemia    Lactic acidosis    Alcohol withdrawal, uncomplicated    Respiratory failure, post-operative    Encephalopathy    Cognitive disorder    Compulsive behaviors    Alcoholic hepatitis without ascites    Leukocytosis      Subjective     Patient with no complaints today. Staples are itchy for him as is his dressing over one of his ZACHARY drains. He asks when he can go to [place].    Many family members visiting. Ok with plan to transfer to floor.    Review of Systems   12-point review of systems negative except as noted above.    Objective    Physical Exam  General appearance: alert male responds to questions, slowed mentation. Starts fidgeting with staples as soon as I examined them  Head: Normocephalic, without obvious abnormality, atraumatic  Eyes: sclerae icteric  Throat: lips, mucosa, and tongue normal; teeth and gums normal  Lungs: clear to auscultation bilaterally, no wheezes, rales, or rhonchi  Heart: regular rate and rhythm, S1, S2 normal, no murmur, click, rub or gallop  Abdomen: distended, nontender to gentle palpation, abdominal incision intact, staples in place  Extremities: extremities normal, atraumatic, no cyanosis or edema  Skin: Skin color, texture, turgor normal. No rashes or lesions  Neurologic: slowed mentation  Psych: flat affect  Visit Vitals     /67 (Patient Position: Lying)     Pulse (!) 108     Temp 99.2  F (37.3  C) (Oral)     Resp 20     Ht 6' 3\" (1.905 m)     Wt 218 lb 14.7 oz (99.3 kg)     SpO2 94%     BMI 27.36 kg/m2     Results    No results found for this or any previous visit (from the past 12 hour(s)).  Pertinent labs/xray/ekg reviewed.    Total time: >35 minutes " with >50% time spent with coordination of care and counseling reviewing plan of care with patient and family    1/24/2017  Soheila Mills MD  [facility] Medicine Service

## 2021-06-08 NOTE — PROGRESS NOTES
Babak Wolff is extubated, confused, labored breathing        Vitals:    01/16/17 0300 01/16/17 0400 01/16/17 0500 01/16/17 0600   BP: 131/71 115/59 95/51 104/57   Patient Position:  Lying     Pulse: (!) 108 99 66 (!) 104   Resp: (!) 32 (!) 33 25 28   Temp:  98.2  F (36.8  C)     TempSrc:  Axillary     SpO2: 95% 96% 95% 96%   Weight:       Height:           PHYSICAL EXAM:  GEN: confused, non communicative,   ABD- distended, incision intact, carmela drains clamped  EXT:No cyanosis, edema or obvious abnormalities          Results from last 7 days  Lab Units 01/16/17  0408   LN-WHITE BLOOD CELL COUNT thou/uL 8.3   LN-HEMOGLOBIN g/dL 7.7*   LN-HEMATOCRIT % 23.4*   LN-PLATELET COUNT thou/uL 123*         Results from last 7 days  Lab Units 01/16/17  0751 01/16/17  0408   LN-SODIUM mmol/L  --  144   LN-POTASSIUM mmol/L 3.5 3.3*   LN-CHLORIDE mmol/L  --  115*   LN-CO2 mmol/L  --  22   LN-BLOOD UREA NITROGEN mg/dL  --  14   LN-CREATININE mg/dL  --  0.72   LN-CALCIUM mg/dL  --  7.7*   LN-ALBUMIN g/dL  --  2.4*   LN-PROTEIN TOTAL g/dL  --  4.7*   LN-BILIRUBIN TOTAL mg/dL  --  4.3*   LN-ALKALINE PHOSPHATASE U/L  --  59   LN-ALT (SGPT) U/L  --  32   LN-AST (SGOT) U/L  --  87*         A/P:  Babak Wolff is s/p ligation of varix  -unclamped CARMELA drains, please do not clamp again  -expect ascites to continue to drain until more stable and liver recovers which may take weeks  -staples to remain in place  -currently remains ill but slightly improved    Robert Hodge D.O. FACS  [phone]  [facility] Department of Surgery

## 2021-06-08 NOTE — PROGRESS NOTES
DHARMESH spoke with Araseli with [facility], and she met with the family. The family desires comfort cares, which is being initiated today. The family would like to wait and see how the patient does with comfort cares, and possibly look at hospice homes like [facility]. CM will follow care progression, and meet with family for further planning after comfort cares have been initiated.

## 2021-06-08 NOTE — CONSULTS
S: Hospice  B: Spoke to Gonzalez's girlfriend and sister. Their 1st choice is [facility] and 2nd choice would be [facility]. As of now, a [facility] bed is available for him tomorrow.   A: Alert and interacting. C/o feeling cold. Family at bedside. Family prefer to keep conversations outside of room.  R: Ashely is pleased that he looks good today. Hospice is confirming his insurance coverage. Anticipate discharge to [facility] (the bed is his of as right now) when medically stable for discharge.

## 2021-06-08 NOTE — PROGRESS NOTES
01/11/17 1952   Vent Information   Interface Invasive   Vent ID [id number]   Vent Mode VCV   Ventilator Status On   Flowmeter at Bedside Yes   Ambu-Bag With Mask at Bedside Yes   Respiratory Assessment   Assessment Type Assess only   Respiratory Pattern Regular   Chest Assessment Chest expansion symmetrical   Bilateral Breath Sounds Clear   Vent Settings   Resp Rate (Set) 16   FiO2 (%) 40 %   Vt (Set, mL) 600 mL   PEEP/CPAP (cm H2O) 5 cm H2O   Insp Flow (L/min) 50 L/min   Trigger Sensitivity Flow (L/min) 5 L/min   Trigger Sensitivity Pressure (cm H2O) 2 cm H2O   Patient Data   Vt Exp (mL) 80 mL   Minute Ventilation (L/min) 16.7 L/min   Total Resp Rate  28 BPM   PIP Observed (cm H2O) 10 cm H2O   MAP (cm H2O) 6   Auto/Intrinsic PEEP Observed (cm H2O) 3 cm H2O   Plateau Pressure (cm H2O) 17 cm H2O   SpO2 93 %   Heart Rate 82   Alarms   High Resp Rate 35   Low Resp Rate 12   Low PEEP 3 cm H2O   Insp Pressure High (cm H2O) 45 cm H2O   Insp Pressure Low (cm H2O) 10 cm H2O   MV High (L/min) 16 L/min   MV Low (L/min) 4 L/min   Vt High (mL) 1200 mL   Vt Low (mL) 300 mL   Apnea Interval (sec) 30 seconds   Apnea Rate 16   Apnea Volume (mL) 600 mL   Alarm Functional and On Yes   Backup Mode Set Yes   Airways   Airway LDA Subglottic Suction endotracheal tube   Airway Suctioning/Secretions   Suction Device  Catheter   Secretion Amount None   Secretion Color Clear;Unable to assess;Other (Comment)   Secretion Consistency Other (Comment)   Suction Tolerance Tolerated well   Suctioning Adverse Effects None   Subglottic Suction ET Tube 8   Placement Date/Time: 01/11/17 1000   Subglottic Suction ETT Entry Site: Right  Size : 8  Cuffed: Cuffed  ETT Placement Confirmation: Bilateral breath sounds  Placed By: Anesthesiologist   Secured at (cm) 23 cm   Measured from Teeth   Secured Location Left   Secured with Commercial tube reed   Cuff Pressure (cm H2O) 30 cm H2O   Site Condition Dry   Subglottic Secretions Clear   Subglottic Suction  Frequency Intermittent suction   Subglottic Suction Pressure 120 mmHg   Subglottic Suction Lumen Intervention Cleaned   ETT 8   Placement Date/Time: 01/11/17 1008   ETT Type: Straight  Size : 8  Cuffed: Cuffed  Secured at (cm): 22  Glidescope: Blade 4  Bite Block Used: Soft  Placed By: CRNA  Technique: Atraumatic  Difficulty: 0   Assess Pharynx clear   Secured at (cm) 23 cm   Measured from Teeth   Secured Location Left   Secured with Commercial tube reed   Site Condition Dry   Cuff Pressure (cm H2O) 30 cm H2O     Pt remain on above vent setting. BS clear diminished. Tried to section but couldn't get anything. Plan is to wean in morning. Rt will cont to follow up

## 2021-06-08 NOTE — CONSULTS
HPI  61 y.o. year old male who I have been consulted by Dr. Pope for evaluation of Abdominal Pain    Mr. Wolff presents to the emergency room with acute onset of abdominal pain and lightheadedness this morning.  Over the past several weeks he has noted slightly increased in his abdominal girth with bloating and fatigue.  Denies any hematemesis, coffee grounds emesis, black tarry stool or bloody bowel movements.  Prior to presentation to the emergency room he was in his basement when he fell to his knees due to near syncope and was apparently diaphoretic and pale according to his wife.  He also complained of abdominal pain in the left upper and right upper quadrants.  His past medical history is relatively unremarkable but he does admit to drinking at least 2-3 drinks of hard alcohol every day.  While in the emergency room he was noted to be hypotensive and tachycardic and a CT scan was done which demonstrated severe ascites with concern for spontaneous hemorrhage.  Currently he is sitting in a ER gurney conversant and polite with no visible signs of hemorrhagic shock.  He is currently undergoing transfusion.      Allergies:Review of patient's allergies indicates no known allergies.    Past Medical History   Diagnosis Date     Chronic abdominal pain      Insomnia        History reviewed. No pertinent past surgical history.            CURRENT MEDS:    Current Facility-Administered Medications:      benzocaine-menthol lozenge 1 lozenge (CEPACOL), 1 lozenge, Oral, Q1H PRN, Chi Watson MD     bisacodyl suppository 10 mg (DULCOLAX), 10 mg, Rectal, Daily PRN, Chi Watson MD     folic acid tablet 1 mg (FOLVITE), 1 mg, Oral, DAILY, Stopped at 01/09/17 1300 **OR** folic acid injection 1 mg, 1 mg, Intramuscular, DAILY, Chi Watson MD     gabapentin capsule 100-300 mg (NEURONTIN), 100-300 mg, Oral, Q6H PRN, Chi Watson MD     insulin aspart injection (NovoLOG), , Subcutaneous, Q4H FIXED  TIMES, Chi Watson MD, 2 Units at 01/09/17 1210     LORazepam tablet 2 mg (ATIVAN), 2 mg, Oral, Q30 Min PRN **OR** LORazepam injection 2 mg (ATIVAN), 2 mg, Intravenous, Q30 Min PRN **OR** LORazepam injection 2 mg (ATIVAN), 2 mg, Intramuscular, Q30 Min PRN, Chi Watson MD     magnesium hydroxide suspension 30 mL (MILK OF MAG), 30 mL, Oral, Daily PRN, Chi Watson MD     multivitamin therapeutic tablet 1 tablet (THERAGRAN), 1 tablet, Oral, DAILY, Chi Watson MD, Stopped at 01/09/17 1300     naloxone injection 0.04-0.1 mg (NARCAN), 0.04-0.1 mg, Intravenous, PRN **OR** naloxone injection 0.1-0.4 mg (NARCAN), 0.1-0.4 mg, Intramuscular, PRN, Spencer Mayorga MD     ondansetron injection 4 mg (ZOFRAN), 4 mg, Intravenous, Once PRN, Spencer Mayorga MD     ondansetron injection 4 mg (ZOFRAN), 4 mg, Intravenous, Q4H PRN **OR** ondansetron tablet 8 mg (ZOFRAN), 8 mg, Oral, Q8H PRN, Chi Watson MD     pantoprazole injection 40 mg (PROTONIX), 40 mg, Intravenous, Q24H, Chi Watson MD, 40 mg at 01/09/17 1225     polyvinyl alcohol 1.4 % ophthalmic solution 1-2 drop (LIQUIFILM TEARS), 1-2 drop, Both Eyes, Q1H PRN, Chi Watson MD     senna-docusate 8.6-50 mg tablet 1 tablet (PERICOLACE), 1 tablet, Oral, BID **OR** sennosides syrup 8.8 mg (for SENOKOT), 8.8 mg, Enteral Tube, BID, Chi Watson MD     Insert peripheral IV, , , Once **AND** Saline lock IV, , , Once **AND** sodium chloride 0.9 % flush 3 mL (NS), 3 mL, Intravenous, PRN, Spencer Mayorga MD     sodium chloride 0.9% 0-1,000 mL, 0-1,000 mL, Intravenous, PRN, Spencer Mayorga MD, 1,000 mL at 01/09/17 1002     sodium chloride 0.9%, 125 mL/hr, Intravenous, Continuous, Chi Watson MD, Last Rate: 125 mL/hr at 01/09/17 1156, 125 mL/hr at 01/09/17 1156     thiamine tablet 100 mg, 100 mg, Oral, DAILY, Stopped at 01/09/17 1300 **OR** thiamine injection 100 mg (vitamin B1), 100 mg, Intramuscular,  "DAILY, Chi Watson MD    No family history on file.     reports that he has never smoked. He has quit using smokeless tobacco. His smokeless tobacco use included Chew. He reports that he drinks about 3.0 - 3.5 oz of alcohol per week  He reports that he uses illicit drugs.    Review of Systems:  The 12 point review of systems  is within normal limits except for as mentioned above in the HPI.  General ROS: No complaints or constitutional symptoms  Ophthalmic ROS: No complaints of visual changes  Skin: No complaints or symptoms   Endocrine: No complaints or symptoms  Hematologic/Lymphatic: No symptoms or complaints  Psychiatric: No symptoms or complaints  Respiratory ROS: no cough, shortness of breath, or wheezing  Cardiovascular ROS: no chest pain or dyspnea on exertion  Gastrointestinal ROS: As per HPI  Genito-Urinary ROS: no dysuria, trouble voiding, or hematuria  Musculoskeletal ROS: no joint or muscle pain  Neurological ROS: no TIA or stroke symptoms      EXAM:  Visit Vitals     /63     Pulse (!) 119     Temp 98.4  F (36.9  C) (Oral)     Resp 24     Ht 6' 3\" (1.905 m)     Wt (!) 257 lb 11.2 oz (116.9 kg)     SpO2 94%     BMI 32.21 kg/m2     GENERAL: Well developed male, No acute distress, pleasant and conversant   EYES: Pupils equal, round and reactive, no scleral icterus  CARDIAC: Regular rate and rhythm, no obvious murmurs noted  CHEST/LUNG: Clear to ascultation bilaterally, No ronchi, No wheezes  ABDOMEN: Protuberant, positive ascites, tender to palpation in the left and right upper quadrant, visible cutaneous veins with no caput visible  SKIN: Pink, warm and dry, no obvious rashes or lesions   NEURO:No focal deficits, ambulatory  MUSCULOSKELETAL:No obvious deformities, no swelling, normal appearing      LABS:  Lab Results   Component Value Date    WBC 12.3 (H) 01/09/2017    WBC 6.1 06/16/2015    HGB 7.9 (L) 01/09/2017    HCT 22.0 (L) 01/09/2017    MCV 91 01/09/2017     01/09/2017 "     INR/Prothrombin Time    Results from last 7 days  Lab Units 01/09/17  0805   LN-SODIUM mmol/L 135*   LN-POTASSIUM mmol/L 3.9   LN-CHLORIDE mmol/L 103   LN-CO2 mmol/L 13*   LN-BLOOD UREA NITROGEN mg/dL 13   LN-CREATININE mg/dL 1.49*   LN-CALCIUM mg/dL 8.2*     Lab Results   Component Value Date    ALT 22 01/09/2017    AST 90 (H) 01/09/2017    ALKPHOS 74 01/09/2017    BILITOT 2.9 (H) 01/09/2017       IMAGES:   Relevant images were reviewed and discussed with the patient.  Notable findings were:   CTA CHEST ABDOMEN PELVIS  1/9/2017 9:19 AM     INDICATION: Abdominal pain, tachycardia, hypotension. Evaluate for aortic dissection.  TECHNIQUE: Multiphase helical acquisition through the chest, abdomen, and pelvis was performed including noncontrast, arterial phase, and delayed images before and after the administration of IV contrast. 2D and 3D reconstructions were performed by the   CT technologist. Dose reduction techniques were used.   IV CONTRAST: Iohexol (Omni) 75mL  COMPARISON: None.     FINDINGS:  CT ANGIOGRAM CHEST, ABDOMEN, AND PELVIS: No evidence aortic dissection or aneurysm. Mild to moderate calcific arterial plaque. Coronary artery calcification.     CHEST:  LUNGS AND PLEURA: Dependent atelectasis in both lower lobes. Slight emphysema and mild fibrosis in the upper lobes. 5 mm circumscribed nodule right lower lobe on series 4 image 48. No pleural effusion.     MEDIASTINUM: Negative.     ABDOMEN: There is large amount of free fluid with likely clot along the spleen and left paracolic gutter, suggesting hemoperitoneum. There is some soft tissue stranding in the omental fat which could represent inflammatory change or a less likely   neoplastic infiltration.     There is heterogeneous decreased density within the liver suggesting diffuse parenchymal disease, and potentially heterogeneous steatosis. There is mild irregularity to the contour of the liver and the a component of hepatic fibrosis or cirrhosis is  also  possible. There are irregular hypodense foci within the right and left lobes which are of indeterminate etiology. Largest area measures approximately 1.5 x 3 cm.     Gallbladder, pancreas, spleen, adrenal glands, right kidney negative. There is likely a small accessory splenule adjacent to the spleen. 4 mm nonobstructing stone left kidney. Incidental retroaortic left renal vein.     There are few mildly dilated vessels near the stomach and pancreas and in the retroperitoneum on the left which could represent mild varices, there are also a few prominent lymph nodes in this region..     PELVIS: Large amount of free fluid with likely dependent clot.     Colonic diverticula, no diverticulitis. No abnormal bowel distention. Appendix is not identified.     MUSCULOSKELETAL: Degenerative changes in the spine and hips.     IMPRESSION:   CONCLUSION:  1. Large amount of hemoperitoneum. The bleeding source is not identified.  2. Abnormal liver suggesting diffuse parenchymal disease, and potentially steatosis, fibrosis, and/or mild cirrhosis. There are also focal hypodense lesions in liver which are indeterminate. MRI could assess further.  3. There are potentially some mild varices in the upper abdomen-retroperitoneum. However the spleen is not enlarged and the portal vein is not abnormally distended.  4. Soft tissue stranding in the omental fat probably inflammatory, neoplastic infiltration less likely but not excluded.  5. No evidence for aortic dissection or aneurysm.  6. 5 mm nodule right lower lobe. See below for follow-up guidelines.    Assessment/Plan:   Babak Wolff is a 61 y.o. male with probable cirrhosis and spontaneous intra-abdominal hemorrhage mixed with large volume ascites.  On my calculations, his meld score is approximately 21.  I have had an extensive discussion with him and his wife regarding surgical interventions to stop any spontaneous hemorrhage and the fact that this is usually a last resort.   I agree with continued resuscitation and correction of his coagulopathy.  Additionally I have spoken with the ER physician and recommended prompt interventional radiology evaluation with a splenic and hepatic angiography for possible embolization.  However, the likelihood of continued bleeding is relatively low and the hope is that he has clotted off though further intervention needed.  I agree with admission to the ICU and I will continue to follow along in the acute phase.  If for any reason he demonstrates acute decline in his hemodynamics then surgical intervention may be warranted as a last result.      Silvio Hodge D.O. [facility]  [phone]  [facility] Department of Surgery

## 2021-06-08 NOTE — PROGRESS NOTES
Pt more labile today with agitation.  Attempted to wean precedex but pt sitting up in bed pulling at restraints kicking legs  and manipulating ET tube with tongue. Pt very strong and purposeful.  Agitation episodes related to sound and stimulation.  additional ativan 1 mg x1 at 0905 with little immediate relief.  BP up with agitation, levophed titrate during episodes.  PICC nurse here at 1115 for line placement.  pt quiet through procedure no propofol needed.  Right femoral catheter removed with tip intact.  No hematoma no bleeding.  ZACHARY drains total 775.  Drains now clamped.

## 2021-06-08 NOTE — OP NOTE
Name:  Babak Wolff  PCP:  Shaheen Smiley MD  Procedure Date:  1/11/2017      EXPLORATORY LAPAROTOMY, EVACUATION OF HEMAPERITONEUM and ASCITES, OVERSEWING MESENTERIC VARIX (N/A)    Pre-Procedure Diagnosis:  Bleeding [R58]     Post-Procedure Diagnosis:    Bleeding right pericolic mesenteric vein/varices  Cirrhosis    Surgeon(s):  Robert Hodge DO    No Physician Assistant or First Assist has been documented in procedure    Anesthesia Type:  GET      Findings:  Bleeding right pericolic 5mm mesenteric vein/varices  Cirrhosis    Operative Report:    The patient was taken to the operating room placed in a supine position.  After central venous access was obtained he was intubated and antibiotics were given prior skin incision.  I made a generous midline incision and immediately noted several liters of dark blood mixed with ascites.  This was evacuated with suction catheterization.  There is noted to be several liters of old blood as well as new bleeding throughout the entire abdomen.  At this point I did a 4 quadrant packing with laparotomy packs and allow the anesthesiologist to catch up with resuscitation.  Once the patient was hemodynamically stable I then removed the packs and organized fashion beginning with the left upper quadrant followed by the left lower quadrant.  There was no evidence of any bleeding from this site but I did continue to remove several 100 mL of coagulum.  I then turned my attention to the right lower and right upper quadrants.  There is clearly brisk bleeding from one of these 2 quadrants.  After removing the right lower quadrant packs and the right upper quadrant packs sequentially I was able to isolate a very large pericolonic mesenteric varices which measured probably 5 mm in diameter and was bleeding profusely.  I suture ligated this with 3-0 silk sutures in a figure-of-eight fashion as well as with surgical clips.  I then continued my evaluation in the perihepatic region and the  right upper quadrant.  As more clot was removed there is no evidence of any further bleeding.  I irrigated the abdomen with several liters of warm normal saline and again inspected all 4 quadrants for any evidence of ongoing bleeding.  I did not find any.  At this point felt the procedure was complete and I did not elect to leave an open abdomen with packing.  I then placed 219 Hungarian round Yinka drains via a left and right lower quadrant incisions and placed them up along the paracolic gutters on both sides.  I then closed the abdominal wall fascia with 0 PDS loop suture over Seprafilm ×2.  The wound was cleaned surgical staples were used to close the skin.  The wound was then cleaned and covered with a dry sterile pressure dressing and drain sponges were placed around the ZACHARY drain.  The patient remained intubated and a Saeed catheter remain in place during his transport to the ICU.  All lap counts and needle counts are correct at the end of the procedure.    Estimated Blood Loss:   6050 mL from 1/11/2017  9:52 AM to 1/11/2017 12:32 PM    Specimens:    * No specimens in log *       Drains:   Closed/Suction Drain Right Abdomen Bulb 19 Fr. (Active)       Closed/Suction Drain Left Abdomen Bulb 19 Fr. (Active)       NG/OG Tube Orogastric Center mouth (Active)       Urethral Catheter 16 Fr. (Active)   Site Skin Assessment Clean;Intact 1/11/2017  8:00 AM   Care/Interventions Patent and draining;Tubing is not taut/pulling on catheter;Drainage bag kept below bladder;No tubing or bag contact with floor;Urinary drainage bag is kept < half full;No contact between the urinary drainage spout and container when emptying;Provide perineal care and cleanse external portion of catheter daily;Provide perineal care and cleanse external portion of catheter after bowel movement;Periurethral area inspected for signs of inflammation and infection every shift;Closed drainage system maintained 1/11/2017  8:00 AM   Securement Method  Stabilization device 1/11/2017  8:00 AM   Protocol 1: Patient excluded from protocol? Yes, Specific provider order to keep catheter in place 1/11/2017  8:00 AM   Protocol 2: Indication to continue catheter Output monitoring in critical patient 1/11/2017  8:00 AM   Output (mL) 80 mL 1/11/2017  6:45 AM   Drainage Color Myla 1/11/2017  8:00 AM       Complications:    None    Robert Hodge

## 2021-06-08 NOTE — PROGRESS NOTES
Spiritual Care Narrative Note      visited with family and patient at bedside. Family provided update on medical condition and plan of care, saying that they are moving to comfort measures. They asked for a prayer to be said with patient, but not to include anything specific about his prognosis since he is unable to fully comprehend the implications of his illness. They want to focus on positive feelings and experiences while keeping him as comfortable as possible.     Plan of Care:  will continue to visit as able or per request by patient/family/staff.       Gurinder Boykin M.Div., [facility]  , [id number]

## 2021-06-08 NOTE — PROGRESS NOTES
"Pharmacy Note: Total Parenteral Nutrition (TPN) Management    Pharmacist consulted to dose TPN for Babak Wolff, a 61 y.o. male by Dr. Shannon.    Subjective:    The patient is a new TPN start.    The patient was started on TPN in the hospital on 1/14/17.    Indication for TPN therapy: GI tract is not functional: Hemoperitoneum and Sepsis    Inadequate nutrition existing for > 7 days.     Enteral nutrition contraindicated due to: Hemoperitoneum.    Pertinent diseases and other special considerations hepatic failure    Social History   Substance Use Topics     Smoking status: Never Smoker     Smokeless tobacco: Former User     Types: Chew     Alcohol use 3.0 - 3.5 oz/week     6 - 7 drink(s) per week     Objective:    Height: 6' 3\" (1.905 m)    Weight: (!) 108.6 kg (239 lb 6.7 oz)    Body mass index is 29.93 kg/(m^2).    Patient Vitals for the past 96 hrs:   Weight   01/18/17 0000 (!) 108.6 kg (239 lb 6.7 oz)   01/17/17 0100 (!) 110.8 kg (244 lb 4.3 oz)   01/16/17 0015 (!) 116.5 kg (256 lb 13.4 oz)   01/15/17 0100 (!) 115.7 kg (255 lb 1.2 oz)       Labs:  Last 3 days:  Recent Labs      01/15/17   1142  01/15/17   1840   01/16/17   0408  01/16/17   0751  01/16/17   1601  01/16/17   2153  01/17/17   0442  01/18/17   0501   NA   --    --    --   144   --   145   --   146*  146*  148*   K  3.6   --    --   3.3*  3.5  3.7  3.7  3.8  3.9  3.9  3.6   CL   --    --    --   115*   --   116*   --   116*  116*  117*   CO2   --    --    --   22   --   25   --   23  24  24   BUN   --    --    --   14   --   14   --   15  15  19   CREATININE   --    --    --   0.72   --   0.63*   --   0.74  0.73  0.81   GLU   --    --    --   165*   --   175*   --   140*  147*  148*   CALCIUM   --    --    --   7.7*   --   7.5*   --   7.9*  7.9*  8.2*   MG   --    --    --   1.8   --    --    --   1.8  1.8  2.1   PHOS   --    --    --    --    --    --    --   2.4*  2.3*  3.8   PROT   --    --    --   4.7*   --    --    --   4.6*  " 4.8*  5.0*   ALBUMIN   --    --    --   2.4*   --    --    --   2.8*  2.8*  2.7*   PREALBUMIN   --    --    --    --    --    --    --   5.8*   --    TRIG   --    --    --    --    --    --    --   102   --    HGB   --   7.3*   --   7.7*   --    --    --   7.4*  8.5*   HCT   --    --    --   23.4*   --    --    --   22.6*  25.7*   PLT   --    --    --   123*   --    --    --   116*  149   BILITOT   --    --    --   4.3*   --    --    --   4.3*  4.3*  4.2*   AST   --    --    --   87*   --    --    --   71*  70*  73*   ALT   --    --    --   32   --    --    --   26  26   ALKPHOS   --    --    < >  59   --    --    --   47  46  55   INR   --    --    --   1.63*   --    --    --   1.68*   --     < > = values in this interval not displayed.       Fingerstick Blood Glucose (past 48 hours):   Recent Labs      01/16/17   1126  01/16/17   1755  01/17/17   0028  01/17/17   0609  01/17/17   1201  01/17/17   1809  01/17/17   2159  01/17/17   2347  01/18/17   0503   POCGLUFGR  175  183  168  166  193  157  151  130  163       Intake/Output (last 24 hours): I/O last 3 completed shifts:  In: 3146 [I.V.:951; NG/GT:390; IV Piggyback:100]  Out: 4930 [Urine:1335; Drains:3595]    Estimated CrCl: Estimated Creatinine Clearance: 130.7 mL/min (by C-G formula based on Cr of 0.81).    Assessment:    Initiate patient on TPN therapy as a continuous central therapy.     Given the patient's current condition/oral intake, PN is still indicated.    Lab results reviewed: Electrolytes are near normal. Ionized calcium is normal. Phosphorus is now normal after increase in TPN. Sodium is still slightly elevated, decreased in TPN yesterday - will monitor for one more day. Patient is acidotic, so will maximize acetate in the TPN.    Blood glucose levels have been mid 100s. Patient does have orders for Novolog and Lantus, will not add insulin to TPN at this time    Plan:  1. Rate of TPN: 75 mL/hr. Maintenance IV fluid rate will be adjusted  appropriately to meet the total maximum IV fluids rate as ordered by provider.  2. Formula:     Amino Acids 8 %    Dextrose 20 %    Sodium 65 mEq/L    Potassium 35 mEq/L    Magnesium 5 mEq/L    Calcium 4.5 mEq/L    Phosphorus 15 mMol/L    Chloride: Acetate Ratio = Max Acetate    Standard Multivitamins    Trace Elements  3. Fat Emulsion: 250 ml, 4 times weekly on Sun, Tue, Thu, and Sat. Propofol infusion has been discontinued.  4. Check hyperal lytes lab tomorrow as ordered.  5. Pharmacist will continue to follow the patient's lab results, clinical status and blood glucose results and make adjustments as appropriate.    Thank you for the consult.  Kathleen London, PharmD 1/18/2017 10:20 AM

## 2021-06-15 PROBLEM — K66.1: Status: ACTIVE | Noted: 2017-01-01

## 2021-06-15 PROBLEM — R57.9 SHOCK CIRCULATORY (H): Status: ACTIVE | Noted: 2017-01-01

## 2021-06-15 PROBLEM — K72.10 END STAGE LIVER DISEASE (H): Status: ACTIVE | Noted: 2017-01-01

## 2021-07-01 NOTE — PROCEDURES
"Procedures by Ana Maria Canales RN at 1/14/2017 11:51 AM     Author: Ana Maria Canales RN Service: -- Author Type: Registered Nurse    Filed: 1/14/2017 11:53 AM Date of Service: 1/14/2017 11:51 AM Status: Signed    : Ana Maria Canales RN (Registered Nurse)     Procedures    1. PICC [GPE110 (Custom)]             PICC Line Insertion Procedure Note  Pt. Name: Babak Wolff  MRN:        [id number]    Procedure: Insertion of a  triple Lumen  5 fr  Bard SOLO (valved) Power PICC, Lot number [id number]    Indications: Vasopressors, medications. Multiple infusions    Contraindications : None noted in H&P    Procedure Details   Patient identified with 2 identifiers and \"Time Out\" conducted.  .     Central line insertion bundle followed: hand hygeine performed prior to procedure, site cleansed with cholraprep, hat, mask, sterile gloves,sterile gown worn, patient draped with maximum barrier head to toe drape, sterile field maintained.    The vein was assessed and found to be compressible and of adequate size. 3 ml 1% Lidocaine administered sq to the insertion site. A 5 Fr PICC was inserted into the basilic vein of the right arm with ultrasound guidance. 2 attempt(s) required to access vein.   Catheter threaded without difficulty. Good blood return noted.    Modified Seldinger Technique used for insertion.    The 8 sharps that are included in the PICC insertion kit were accounted for and disposed of in the sharps container prior to breakdown of the sterile field.    Catheter secured with Statlock, biopatch and Tegaderm dressing applied.    Findings:  Total catheter length  46 cm, with 2 cm exposed. Mid upper arm circumference is 32 cm. Catheter was flushed with 30 cc NS. Patient  tolerated procedure well.    Tip placement verified by 3CG. Tip placement in the SVC.        CLABSI prevention brochure left at bedside.    Patient's primary RN notified PICC is ready for use.    Comments:          Ana Maria Canales RN    [facility] " access [phone]

## 2023-04-20 NOTE — PROGRESS NOTES
PULMONARY / CRITICAL CARE PROGRESS NOTE    Date / Time of Admission:  1/9/2017  7:44 AM    Assessment:   Principal Problem:    Shock circulatory  Active Problems:    Hemorrhage, peritoneal    Acute blood loss anemia    Lactic acidosis    Alcohol withdrawal, uncomplicated    Respiratory failure, post-operative    Bleeding mesenteric varix  Coagulopathy  Liver cirrhosis  Hemorrhagic shock  Alcohol withdrawal with delirium  RLL nodule, 5mm       Advance Directives:  full      Plan:   Neuro:  Sedated with precedex and fentanyl  Scheduled ativan for etoh withdrawal.  He has no tremor but is very brittle with sedation - either very agitated or very sedated  Added scheduled haldol as well    CVS:  Post op hypotension due to hemorrhagic shock likely resolved but with persistent low bp in setting of liver disease, sedation, ascites output.  His BP normalizes when he wakes up a bit.  Follow hgb and transfuse as needed to keep hgb>7  Still on vaso and levophed but weaning. Vasodilated. Will aim for SBP>    i anticipate he will need central access for some time.  Will change to PICC from femoral.    Pulm:  If he tolerates lightening of sedation we can start SBT.  Sputum cx pending      GI:  Alcoholic liver cirrhosis. Bleeding mesenteric varix with hemoperitoneum  Post op now, bleeding is controlled  Changed abx to ertapenem since there was a question of diverticulitis   Protonix IV    :  MODESTA.  Improving.   Good UO  Start some maintenance due to ascites output    Hem:  Hgb bid, keep > 7  Coagulopathy - received 4 units FFP, Kcentra and vitK and prbc  Fibrinogen was above 100    Hgb stable overnight. No more need for PRBC's  INR looks fine. He is not bleeding.             ICU DAILY CHECKLIST                           Can patient transfer out of MICU? no    FAST HUG:    Feeding:  Feeding: No.  Patient is receiving NPO    Saeed:Yes  Analgesia/Sedation:Yes fentanyl and precedex  Thromboembolic prophylaxis: yes; Mode:   SCDs  HOB>30:  Yes  Stress Ulcer Protocol Active: yes; Mode: PPI  Glycemic Control: Any glucose > 180 no; Mode of Insulin Therapy: Sliding Scale Insulin and Lantus    INTUBATED:  Can patient have daily waking:  assessing  Can patient have spontaneous breathing trial:  assessing    Restraints? yes    PHYSICAL THERAPY AND MOBILITY:  Can patient have PT and mobility trial: yes  Activity: Bed Rest    Critical Care Time greater than: 45 minutes.  Multiple bedside assessments of hemodynamics and mental status.          Subjective: a bit more pressor requirement over night.  Lots of ascites output.  Awakens from sedation very abruptly   HPI:  Babak Wolff is a 61 y.o. old male with a history of hypertension and alcohol use. He states that he's been dealing with on and off abdominal pain for the past 3 months. He describes the pain as sudden onset and his lower abdomen. This morning he had the same amount of pain particularly worse also tachypneic, felt short of breath and when he tried to stand up he collapsed. She doesn't think she lost consciousness and he states that he just got weak and his knees. He states that his bili is only somewhat distended but it felt more firm today  Emergency room he had a CT of the abdomen that showed the large intraperitoneal hematoma. Hemoglobin was decreased from 12.5-7.1 compared to a few months ago. LFTs slightly elevated including bilirubin and his INR is 1.68. Contour of the liver was irregular.  Initially hypotensive with elevated lactic acid. After he received a liter of normal saline has blood pressure stabilized. After that 2 units of PRBCs were ordered.    Principal Problem:    Shock circulatory  Active Problems:    Hemorrhage, peritoneal    Acute blood loss anemia    Lactic acidosis    Alcohol withdrawal, uncomplicated    Respiratory failure, post-operative      Allergies: Review of patient's allergies indicates no known allergies.     MEDS:  Scheduled Meds:    chlorhexidine   "15 mL Topical Q12H     ertapenem (INVanz) IV  1 g Intravenous Q24H     folic acid  1 mg Oral DAILY    Or     folic acid (FOLVITE) IVPB  1 mg Intravenous DAILY     haloperidol lactate  2 mg Intravenous Q6H     insulin aspart (NovoLOG) injection   Subcutaneous Q4H FIXED TIMES     insulin glargine  8 Units Subcutaneous QAM     LORazepam  1 mg Intravenous Q6H     multivitamin therapeutic  1 tablet Oral DAILY     pantoprazole  40 mg Intravenous Q24H     senna-docusate  1 tablet Oral BID    Or     senna (SENOKOT) syrup  8.8 mg Enteral Tube BID     thiamine  100 mg Oral DAILY    Or     thiamine (vitamin B1) IVPB  100 mg Intravenous DAILY     Continuous Infusions:    dexmedetomidine 400 mcg/100 mL in NS (PRECEDEX) (4mcg/mL) 1.4 mcg/kg/hr (01/14/17 1035)     fentaNYL 1500 mcg/150 ml in NS (10 mcg/ml) 125 mcg/hr (01/14/17 0849)     norepinephrine IV infusion in D5W 6 mcg/min (01/14/17 1031)     nacl 0.9% 75 mL/hr (01/14/17 0924)     vasopressin 100 units/100 ml (PITRESSIN)in D5W (1 unit/ml) 2.4 Units/hr (01/14/17 1038)     PRN Meds:.acetaminophen, benzocaine-menthol, bisacodyl, dextrose 50 % (D50W), diphenhydrAMINE, fentaNYL pf, gabapentin, glucagon (human recombinant), hydrALAZINE, lidocaine (PF), LORazepam, magnesium hydroxide, naloxone **OR** naloxone, ondansetron **OR** ondansetron, oxyCODONE, polyvinyl alcohol, Insert peripheral IV **AND** Saline lock IV **AND** sodium chloride      Objective:   VITALS:    Visit Vitals     /47 (Patient Position: Lying)     Pulse (!) 57     Temp 98  F (36.7  C) (Axillary)     Resp 16     Ht 6' 3\" (1.905 m)     Wt (!) 256 lb 6.3 oz (116.3 kg)     SpO2 96%     BMI 32.05 kg/m2     EXAM:  General appearance: intubated , sedated. Jaundiced  Head: Normocephalic, without obvious abnormality, atraumatic  Lungs: clear to auscultation bilaterally  Heart: regular rate and rhythm, S1, S2 normal, no murmur, click, rub or gallop  Abdomen: s/p laparotomy, 2 ZACHARY drains in place. Abdomen is " soft  Extremities: trace edema marta  Neurologic: sedated, perrl  Skin: warm to touch    I&O:      Intake/Output Summary (Last 24 hours) at 01/14/17 1053  Last data filed at 01/14/17 1038   Gross per 24 hour   Intake 3911.72 ml   Output   3880 ml   Net  31.72 ml       Data Review:    CBC:   Lab Results   Component Value Date    WBC 11.3 (H) 01/14/2017    WBC 6.1 06/16/2015    RBC 2.55 (L) 01/14/2017     BMP:   Lab Results   Component Value Date    CO2 21 (L) 01/14/2017    BUN 20 01/14/2017    CREATININE 0.68 (L) 01/14/2017    CALCIUM 7.3 (L) 01/14/2017     Serum Glucose range:Invalid input(s): GLUCOSEPOCT      By:  Alf Shannon, 1/14/2017, 1:11 PM    Primary Care Physician:  Shaheen Smiley MD                      Soolantra Counseling: I discussed with the patients the risks of topial Soolantra. This is a medicine which decreases the number of mites and inflammation in the skin. You experience burning, stinging, eye irritation or allergic reactions.  Please call our office if you develop any problems from using this medication.